# Patient Record
Sex: MALE | Race: WHITE | NOT HISPANIC OR LATINO | Employment: OTHER | URBAN - METROPOLITAN AREA
[De-identification: names, ages, dates, MRNs, and addresses within clinical notes are randomized per-mention and may not be internally consistent; named-entity substitution may affect disease eponyms.]

---

## 2024-01-01 ENCOUNTER — APPOINTMENT (OUTPATIENT)
Dept: OCCUPATIONAL THERAPY | Facility: CLINIC | Age: 79
End: 2024-01-01
Attending: STUDENT IN AN ORGANIZED HEALTH CARE EDUCATION/TRAINING PROGRAM
Payer: MEDICARE

## 2024-01-01 ENCOUNTER — APPOINTMENT (OUTPATIENT)
Dept: MRI IMAGING | Facility: CLINIC | Age: 79
End: 2024-01-01
Attending: STUDENT IN AN ORGANIZED HEALTH CARE EDUCATION/TRAINING PROGRAM
Payer: MEDICARE

## 2024-01-01 ENCOUNTER — APPOINTMENT (OUTPATIENT)
Dept: CT IMAGING | Facility: CLINIC | Age: 79
End: 2024-01-01
Attending: PHYSICIAN ASSISTANT
Payer: MEDICARE

## 2024-01-01 ENCOUNTER — APPOINTMENT (OUTPATIENT)
Dept: GENERAL RADIOLOGY | Facility: CLINIC | Age: 79
End: 2024-01-01
Attending: INTERNAL MEDICINE
Payer: MEDICARE

## 2024-01-01 ENCOUNTER — APPOINTMENT (OUTPATIENT)
Dept: CT IMAGING | Facility: CLINIC | Age: 79
End: 2024-01-01
Attending: INTERNAL MEDICINE
Payer: MEDICARE

## 2024-01-01 ENCOUNTER — APPOINTMENT (OUTPATIENT)
Dept: CT IMAGING | Facility: CLINIC | Age: 79
End: 2024-01-01
Attending: PSYCHIATRY & NEUROLOGY
Payer: MEDICARE

## 2024-01-01 ENCOUNTER — APPOINTMENT (OUTPATIENT)
Dept: INTERVENTIONAL RADIOLOGY/VASCULAR | Facility: CLINIC | Age: 79
End: 2024-01-01
Attending: INTERNAL MEDICINE
Payer: MEDICARE

## 2024-01-01 ENCOUNTER — APPOINTMENT (OUTPATIENT)
Dept: MRI IMAGING | Facility: CLINIC | Age: 79
DRG: 023 | End: 2024-01-01
Attending: NURSE PRACTITIONER
Payer: MEDICARE

## 2024-01-01 ENCOUNTER — APPOINTMENT (OUTPATIENT)
Dept: GENERAL RADIOLOGY | Facility: CLINIC | Age: 79
End: 2024-01-01
Attending: HOSPITALIST
Payer: MEDICARE

## 2024-01-01 ENCOUNTER — APPOINTMENT (OUTPATIENT)
Dept: MRI IMAGING | Facility: CLINIC | Age: 79
End: 2024-01-01
Attending: NURSE PRACTITIONER
Payer: MEDICARE

## 2024-01-01 ENCOUNTER — APPOINTMENT (OUTPATIENT)
Dept: GENERAL RADIOLOGY | Facility: CLINIC | Age: 79
End: 2024-01-01
Attending: SURGERY
Payer: MEDICARE

## 2024-01-01 ENCOUNTER — APPOINTMENT (OUTPATIENT)
Dept: MRI IMAGING | Facility: CLINIC | Age: 79
End: 2024-01-01
Attending: PHYSICIAN ASSISTANT
Payer: MEDICARE

## 2024-01-01 ENCOUNTER — APPOINTMENT (OUTPATIENT)
Dept: CT IMAGING | Facility: CLINIC | Age: 79
End: 2024-01-01
Attending: EMERGENCY MEDICINE
Payer: MEDICARE

## 2024-01-01 ENCOUNTER — APPOINTMENT (OUTPATIENT)
Dept: SPEECH THERAPY | Facility: CLINIC | Age: 79
End: 2024-01-01
Attending: HOSPITALIST
Payer: MEDICARE

## 2024-01-01 PROCEDURE — G1010 CDSM STANSON: HCPCS

## 2024-01-01 PROCEDURE — 70496 CT ANGIOGRAPHY HEAD: CPT | Mod: MF

## 2024-01-01 PROCEDURE — 76937 US GUIDE VASCULAR ACCESS: CPT

## 2024-01-01 PROCEDURE — 70553 MRI BRAIN STEM W/O & W/DYE: CPT | Mod: MG

## 2024-01-01 PROCEDURE — 70553 MRI BRAIN STEM W/O & W/DYE: CPT | Mod: MF

## 2024-01-01 PROCEDURE — 999N000065 XR CHEST PORT 1 VIEW

## 2024-01-01 PROCEDURE — 70450 CT HEAD/BRAIN W/O DYE: CPT | Mod: MG,77

## 2024-01-01 PROCEDURE — 71045 X-RAY EXAM CHEST 1 VIEW: CPT

## 2024-01-01 PROCEDURE — 70544 MR ANGIOGRAPHY HEAD W/O DYE: CPT | Mod: MG

## 2024-01-01 PROCEDURE — 70496 CT ANGIOGRAPHY HEAD: CPT | Mod: MA

## 2024-01-01 PROCEDURE — 70450 CT HEAD/BRAIN W/O DYE: CPT | Mod: MG

## 2024-01-01 PROCEDURE — C1725 CATH, TRANSLUMIN NON-LASER: HCPCS

## 2024-01-01 PROCEDURE — 70450 CT HEAD/BRAIN W/O DYE: CPT

## 2024-01-01 PROCEDURE — 36226 PLACE CATH VERTEBRAL ART: CPT

## 2024-01-01 PROCEDURE — 999N000065 XR ABDOMEN PORT 1 VIEW

## 2024-12-25 ENCOUNTER — APPOINTMENT (OUTPATIENT)
Dept: CT IMAGING | Facility: CLINIC | Age: 79
DRG: 023 | End: 2024-12-25
Attending: EMERGENCY MEDICINE
Payer: MEDICARE

## 2024-12-25 ENCOUNTER — HOSPITAL ENCOUNTER (INPATIENT)
Facility: CLINIC | Age: 79
End: 2024-12-25
Attending: EMERGENCY MEDICINE | Admitting: STUDENT IN AN ORGANIZED HEALTH CARE EDUCATION/TRAINING PROGRAM
Payer: MEDICARE

## 2024-12-25 DIAGNOSIS — I65.1 BASILAR ARTERY STENOSIS: ICD-10-CM

## 2024-12-25 DIAGNOSIS — G47.33 OSA (OBSTRUCTIVE SLEEP APNEA): Primary | ICD-10-CM

## 2024-12-25 DIAGNOSIS — R47.81 SLURRED SPEECH: ICD-10-CM

## 2024-12-25 LAB
ANION GAP SERPL CALCULATED.3IONS-SCNC: 13 MMOL/L (ref 7–15)
APTT PPP: 26 SECONDS (ref 22–38)
ATRIAL RATE - MUSE: 64 BPM
BASOPHILS # BLD AUTO: 0.1 10E3/UL (ref 0–0.2)
BASOPHILS NFR BLD AUTO: 1 %
BUN SERPL-MCNC: 14.9 MG/DL (ref 8–23)
CALCIUM SERPL-MCNC: 9.2 MG/DL (ref 8.8–10.4)
CHLORIDE SERPL-SCNC: 102 MMOL/L (ref 98–107)
CHOLEST SERPL-MCNC: 222 MG/DL
CREAT SERPL-MCNC: 1.44 MG/DL (ref 0.67–1.17)
DIASTOLIC BLOOD PRESSURE - MUSE: NORMAL MMHG
EGFRCR SERPLBLD CKD-EPI 2021: 49 ML/MIN/1.73M2
EOSINOPHIL # BLD AUTO: 0.2 10E3/UL (ref 0–0.7)
EOSINOPHIL NFR BLD AUTO: 3 %
ERYTHROCYTE [DISTWIDTH] IN BLOOD BY AUTOMATED COUNT: 12 % (ref 10–15)
EST. AVERAGE GLUCOSE BLD GHB EST-MCNC: 100 MG/DL
GLUCOSE SERPL-MCNC: 85 MG/DL (ref 70–99)
HBA1C MFR BLD: 5.1 %
HCO3 SERPL-SCNC: 26 MMOL/L (ref 22–29)
HCT VFR BLD AUTO: 48.9 % (ref 40–53)
HDLC SERPL-MCNC: 38 MG/DL
HGB BLD-MCNC: 17.1 G/DL (ref 13.3–17.7)
IMM GRANULOCYTES # BLD: 0 10E3/UL
IMM GRANULOCYTES NFR BLD: 0 %
INR PPP: 1.1 (ref 0.85–1.15)
INTERPRETATION ECG - MUSE: NORMAL
LDLC SERPL CALC-MCNC: 131 MG/DL
LYMPHOCYTES # BLD AUTO: 1.6 10E3/UL (ref 0.8–5.3)
LYMPHOCYTES NFR BLD AUTO: 24 %
MCH RBC QN AUTO: 32.1 PG (ref 26.5–33)
MCHC RBC AUTO-ENTMCNC: 35 G/DL (ref 31.5–36.5)
MCV RBC AUTO: 92 FL (ref 78–100)
MONOCYTES # BLD AUTO: 0.7 10E3/UL (ref 0–1.3)
MONOCYTES NFR BLD AUTO: 11 %
NEUTROPHILS # BLD AUTO: 4 10E3/UL (ref 1.6–8.3)
NEUTROPHILS NFR BLD AUTO: 61 %
NONHDLC SERPL-MCNC: 184 MG/DL
NRBC # BLD AUTO: 0 10E3/UL
NRBC BLD AUTO-RTO: 0 /100
P AXIS - MUSE: 58 DEGREES
PLATELET # BLD AUTO: 184 10E3/UL (ref 150–450)
POTASSIUM SERPL-SCNC: 3.7 MMOL/L (ref 3.4–5.3)
PR INTERVAL - MUSE: 164 MS
QRS DURATION - MUSE: 86 MS
QT - MUSE: 412 MS
QTC - MUSE: 425 MS
R AXIS - MUSE: -27 DEGREES
RBC # BLD AUTO: 5.32 10E6/UL (ref 4.4–5.9)
SODIUM SERPL-SCNC: 141 MMOL/L (ref 135–145)
SYSTOLIC BLOOD PRESSURE - MUSE: NORMAL MMHG
T AXIS - MUSE: 67 DEGREES
T4 FREE SERPL-MCNC: 0.93 NG/DL (ref 0.9–1.7)
TRIGL SERPL-MCNC: 265 MG/DL
TROPONIN T SERPL HS-MCNC: 11 NG/L
TROPONIN T SERPL HS-MCNC: 12 NG/L
TROPONIN T SERPL HS-MCNC: 13 NG/L
TSH SERPL DL<=0.005 MIU/L-ACNC: 4.94 UIU/ML (ref 0.3–4.2)
UFH PPP CHRO-ACNC: 0.53 IU/ML
VENTRICULAR RATE- MUSE: 64 BPM
WBC # BLD AUTO: 6.6 10E3/UL (ref 4–11)

## 2024-12-25 PROCEDURE — 36415 COLL VENOUS BLD VENIPUNCTURE: CPT | Performed by: STUDENT IN AN ORGANIZED HEALTH CARE EDUCATION/TRAINING PROGRAM

## 2024-12-25 PROCEDURE — 84443 ASSAY THYROID STIM HORMONE: CPT | Performed by: STUDENT IN AN ORGANIZED HEALTH CARE EDUCATION/TRAINING PROGRAM

## 2024-12-25 PROCEDURE — 250N000013 HC RX MED GY IP 250 OP 250 PS 637: Performed by: EMERGENCY MEDICINE

## 2024-12-25 PROCEDURE — 250N000009 HC RX 250: Performed by: EMERGENCY MEDICINE

## 2024-12-25 PROCEDURE — 85610 PROTHROMBIN TIME: CPT | Performed by: EMERGENCY MEDICINE

## 2024-12-25 PROCEDURE — 250N000013 HC RX MED GY IP 250 OP 250 PS 637: Performed by: STUDENT IN AN ORGANIZED HEALTH CARE EDUCATION/TRAINING PROGRAM

## 2024-12-25 PROCEDURE — 80061 LIPID PANEL: CPT | Performed by: STUDENT IN AN ORGANIZED HEALTH CARE EDUCATION/TRAINING PROGRAM

## 2024-12-25 PROCEDURE — 85520 HEPARIN ASSAY: CPT | Performed by: EMERGENCY MEDICINE

## 2024-12-25 PROCEDURE — 80048 BASIC METABOLIC PNL TOTAL CA: CPT | Performed by: EMERGENCY MEDICINE

## 2024-12-25 PROCEDURE — A9585 GADOBUTROL INJECTION: HCPCS | Performed by: STUDENT IN AN ORGANIZED HEALTH CARE EDUCATION/TRAINING PROGRAM

## 2024-12-25 PROCEDURE — 250N000011 HC RX IP 250 OP 636: Performed by: EMERGENCY MEDICINE

## 2024-12-25 PROCEDURE — 93005 ELECTROCARDIOGRAM TRACING: CPT

## 2024-12-25 PROCEDURE — 84484 ASSAY OF TROPONIN QUANT: CPT | Performed by: EMERGENCY MEDICINE

## 2024-12-25 PROCEDURE — 85025 COMPLETE CBC W/AUTO DIFF WBC: CPT | Performed by: EMERGENCY MEDICINE

## 2024-12-25 PROCEDURE — 258N000003 HC RX IP 258 OP 636: Performed by: STUDENT IN AN ORGANIZED HEALTH CARE EDUCATION/TRAINING PROGRAM

## 2024-12-25 PROCEDURE — 70450 CT HEAD/BRAIN W/O DYE: CPT | Mod: MA

## 2024-12-25 PROCEDURE — 99418 PROLNG IP/OBS E/M EA 15 MIN: CPT | Performed by: STUDENT IN AN ORGANIZED HEALTH CARE EDUCATION/TRAINING PROGRAM

## 2024-12-25 PROCEDURE — 99223 1ST HOSP IP/OBS HIGH 75: CPT | Mod: AI | Performed by: STUDENT IN AN ORGANIZED HEALTH CARE EDUCATION/TRAINING PROGRAM

## 2024-12-25 PROCEDURE — 36415 COLL VENOUS BLD VENIPUNCTURE: CPT | Performed by: EMERGENCY MEDICINE

## 2024-12-25 PROCEDURE — 120N000013 HC R&B IMCU

## 2024-12-25 PROCEDURE — 255N000002 HC RX 255 OP 636: Performed by: STUDENT IN AN ORGANIZED HEALTH CARE EDUCATION/TRAINING PROGRAM

## 2024-12-25 PROCEDURE — 99291 CRITICAL CARE FIRST HOUR: CPT | Mod: 25

## 2024-12-25 PROCEDURE — 85730 THROMBOPLASTIN TIME PARTIAL: CPT | Performed by: EMERGENCY MEDICINE

## 2024-12-25 PROCEDURE — 84439 ASSAY OF FREE THYROXINE: CPT | Performed by: STUDENT IN AN ORGANIZED HEALTH CARE EDUCATION/TRAINING PROGRAM

## 2024-12-25 PROCEDURE — 83036 HEMOGLOBIN GLYCOSYLATED A1C: CPT | Performed by: STUDENT IN AN ORGANIZED HEALTH CARE EDUCATION/TRAINING PROGRAM

## 2024-12-25 PROCEDURE — 99291 CRITICAL CARE FIRST HOUR: CPT | Mod: FS | Performed by: PHYSICIAN ASSISTANT

## 2024-12-25 PROCEDURE — 84484 ASSAY OF TROPONIN QUANT: CPT | Performed by: STUDENT IN AN ORGANIZED HEALTH CARE EDUCATION/TRAINING PROGRAM

## 2024-12-25 RX ORDER — IOPAMIDOL 755 MG/ML
67 INJECTION, SOLUTION INTRAVASCULAR ONCE
Status: COMPLETED | OUTPATIENT
Start: 2024-12-25 | End: 2024-12-25

## 2024-12-25 RX ORDER — ONDANSETRON 4 MG/1
4 TABLET, ORALLY DISINTEGRATING ORAL EVERY 6 HOURS PRN
Status: ACTIVE | OUTPATIENT
Start: 2024-12-25

## 2024-12-25 RX ORDER — POLYETHYLENE GLYCOL 3350 17 G/17G
17 POWDER, FOR SOLUTION ORAL 2 TIMES DAILY PRN
Status: ACTIVE | OUTPATIENT
Start: 2024-12-25

## 2024-12-25 RX ORDER — AMOXICILLIN 250 MG
2 CAPSULE ORAL 2 TIMES DAILY PRN
Status: ACTIVE | OUTPATIENT
Start: 2024-12-25

## 2024-12-25 RX ORDER — ASPIRIN 325 MG
325 TABLET ORAL ONCE
Status: COMPLETED | OUTPATIENT
Start: 2024-12-25 | End: 2024-12-25

## 2024-12-25 RX ORDER — HEPARIN SODIUM 10000 [USP'U]/100ML
0-5000 INJECTION, SOLUTION INTRAVENOUS CONTINUOUS
Status: DISPENSED | OUTPATIENT
Start: 2024-12-25

## 2024-12-25 RX ORDER — AMOXICILLIN 250 MG
1 CAPSULE ORAL 2 TIMES DAILY PRN
Status: ACTIVE | OUTPATIENT
Start: 2024-12-25

## 2024-12-25 RX ORDER — CLOPIDOGREL 300 MG/1
300 TABLET, FILM COATED ORAL ONCE
Status: COMPLETED | OUTPATIENT
Start: 2024-12-25 | End: 2024-12-25

## 2024-12-25 RX ORDER — ONDANSETRON 2 MG/ML
4 INJECTION INTRAMUSCULAR; INTRAVENOUS EVERY 6 HOURS PRN
Status: ACTIVE | OUTPATIENT
Start: 2024-12-25

## 2024-12-25 RX ORDER — CALCIUM CARBONATE 500 MG/1
1000 TABLET, CHEWABLE ORAL 4 TIMES DAILY PRN
Status: ACTIVE | OUTPATIENT
Start: 2024-12-25

## 2024-12-25 RX ORDER — GADOBUTROL 604.72 MG/ML
9 INJECTION INTRAVENOUS ONCE
Status: COMPLETED | OUTPATIENT
Start: 2024-12-25 | End: 2024-12-25

## 2024-12-25 RX ORDER — ATORVASTATIN CALCIUM 40 MG/1
40 TABLET, FILM COATED ORAL EVERY EVENING
Status: DISCONTINUED | OUTPATIENT
Start: 2024-12-25 | End: 2024-12-26

## 2024-12-25 RX ORDER — LIDOCAINE 40 MG/G
CREAM TOPICAL
Status: ACTIVE | OUTPATIENT
Start: 2024-12-25

## 2024-12-25 RX ORDER — ACETAMINOPHEN 325 MG/1
650 TABLET ORAL EVERY 4 HOURS PRN
Status: ACTIVE | OUTPATIENT
Start: 2024-12-25

## 2024-12-25 RX ORDER — ACETAMINOPHEN 650 MG/1
650 SUPPOSITORY RECTAL EVERY 4 HOURS PRN
Status: ACTIVE | OUTPATIENT
Start: 2024-12-25

## 2024-12-25 RX ADMIN — GADOBUTROL 9 ML: 604.72 INJECTION INTRAVENOUS at 15:57

## 2024-12-25 RX ADMIN — CLOPIDOGREL BISULFATE 300 MG: 300 TABLET, FILM COATED ORAL at 13:39

## 2024-12-25 RX ADMIN — IOPAMIDOL 67 ML: 755 INJECTION, SOLUTION INTRAVENOUS at 12:44

## 2024-12-25 RX ADMIN — SODIUM CHLORIDE 100 ML: 9 INJECTION, SOLUTION INTRAVENOUS at 12:45

## 2024-12-25 RX ADMIN — SODIUM CHLORIDE 500 ML: 9 INJECTION, SOLUTION INTRAVENOUS at 14:25

## 2024-12-25 RX ADMIN — ATORVASTATIN CALCIUM 40 MG: 40 TABLET, FILM COATED ORAL at 22:29

## 2024-12-25 RX ADMIN — ASPIRIN 325 MG ORAL TABLET 325 MG: 325 PILL ORAL at 13:39

## 2024-12-25 RX ADMIN — HEPARIN SODIUM 1100 UNITS/HR: 10000 INJECTION, SOLUTION INTRAVENOUS at 13:51

## 2024-12-25 ASSESSMENT — ACTIVITIES OF DAILY LIVING (ADL)
ADLS_ACUITY_SCORE: 42
ADLS_ACUITY_SCORE: 41
ADLS_ACUITY_SCORE: 42
ADLS_ACUITY_SCORE: 41

## 2024-12-25 NOTE — ED NOTES
Bed: ED01  Expected date:   Expected time:   Means of arrival:   Comments:  437  79 M dizzy/htn/slurred speech/resolved  1206

## 2024-12-25 NOTE — ED NOTES
Pts family called - stated patient developed slurred speech again. No facial droop.  No changes in sensation to face. Neuro at bedside for reexam. MD aware.

## 2024-12-25 NOTE — ED PROVIDER NOTES
Emergency Department Note      History of Present Illness     Chief Complaint   Dizziness (Sudden onset dizziness/sweating/family reported some slurred speech that was brief and gone by the time medics arrived. Was just sitting when episode happened.  No dizziness w change of position.  Off balance. +5 strength. LKW 1140)      HPI   Elio Aguilar is a 79 year old male presenting to the ED for the evaluation of dizziness and slurred speech. The patient states that he was using the bathroom earlier today around 1140 (last known well) when he suddenly experienced dizziness, full body weakness, and slurred speech. He states that the slurred speech event lasted less than 10 minutes but that he does not feel his speech has returned to normal yet at this point. He reports his other symptoms to be relatively improved since onset although notes that he still feels a tingling sensation currently. Elio denies difficulty finding words, numbness on either side of his body, headache, vision changes, abdominal pain, chest pain, or heart racing. He also denies any medical conditions, regular medications, or allergies. He states that he has never experienced these symptoms before.    Independent Historian   None      Past Medical History     Medical History and Problem List   Fracture    Medications   No current outpatient medications on file.    Surgical History   Cardiac surgery    Physical Exam     Physical Exam  Eyes:  The pupils are equal and round    Conjunctivae and sclerae are normal  ENT:    The nose is normal    Pinnae are normal    The oropharynx is normal  Neck:  Normal range of motion    There is no rigidity noted  CV:  Regular rate and rhythm     No edema  Resp:  Lungs are clear    Non-labored    No rales    No wheezing   GI:  Abdomen is soft, there is no rigidity    No distension    No rebound tenderness   MS:  Normal muscular tone    No asymmetric leg swelling  Skin:  No rash or acute skin lesions  noted  Neuro:   Awake, alert, GCS 15    Speech is normal and fluent    Face is symmetric    Moves all extremities    Normal finger-nose-finger     strength equal bilaterally    Equal sensation bilaterally    Hip flexion 5/5 bilaterally       Diagnostics     Lab Results   Labs Ordered and Resulted from Time of ED Arrival to Time of ED Departure   BASIC METABOLIC PANEL - Abnormal       Result Value    Sodium 141      Potassium 3.7      Chloride 102      Carbon Dioxide (CO2) 26      Anion Gap 13      Urea Nitrogen 14.9      Creatinine 1.44 (*)     GFR Estimate 49 (*)     Calcium 9.2      Glucose 85     TSH WITH FREE T4 REFLEX - Abnormal    TSH 4.94 (*)    INR - Normal    INR 1.10     PARTIAL THROMBOPLASTIN TIME - Normal    aPTT 26     TROPONIN T, HIGH SENSITIVITY - Normal    Troponin T, High Sensitivity 12     HEMOGLOBIN A1C - Normal    Estimated Average Glucose 100      Hemoglobin A1C 5.1     T4 FREE - Normal    Free T4 0.93     TROPONIN T, HIGH SENSITIVITY - Normal    Troponin T, High Sensitivity 13     GLUCOSE MONITOR NURSING POCT   CBC WITH PLATELETS AND DIFFERENTIAL    WBC Count 6.6      RBC Count 5.32      Hemoglobin 17.1      Hematocrit 48.9      MCV 92      MCH 32.1      MCHC 35.0      RDW 12.0      Platelet Count 184      % Neutrophils 61      % Lymphocytes 24      % Monocytes 11      % Eosinophils 3      % Basophils 1      % Immature Granulocytes 0      NRBCs per 100 WBC 0      Absolute Neutrophils 4.0      Absolute Lymphocytes 1.6      Absolute Monocytes 0.7      Absolute Eosinophils 0.2      Absolute Basophils 0.1      Absolute Immature Granulocytes 0.0      Absolute NRBCs 0.0     LIPID REFLEX TO DIRECT LDL PANEL   GLUCOSE MONITOR NURSING POCT   GLUCOSE MONITOR NURSING POCT   GLUCOSE MONITOR NURSING POCT   TROPONIN T, HIGH SENSITIVITY       Imaging      CTA Head Neck with Contrast   Final Result   IMPRESSION:    HEAD CTA:    1.  Multifocal high-grade narrowings of the proximal and mid basilar artery.  The distal basilar artery is patent and has normal caliber. There is a prominent left posterior communicating artery.   2.  High-grade narrowing of the P1 segment of the left posterior cerebral artery. Mild to moderate narrowing of the distal P2 segment of the right posterior cerebral artery.   3.  Short segment moderate narrowing of the M1 segment of the left middle cerebral artery. The left middle cerebral artery has normal caliber distal to this area.    4.  Moderate atherosclerotic narrowing of the supraclinoid right internal carotid artery.       NECK CTA:   1.  Approximately 40-50% narrowing of the proximal right internal carotid artery based on the NASCET criteria.   2.  No hemodynamically significant narrowing of the left internal carotid artery based on the NASCET criteria.   3.  The vertebral arteries are patent throughout their course in the neck.      Discussed the CTA findings with Dr. Boogie at 12:56 PM and 1:04 PM, 12/25/2024.      CT Head w/o Contrast   Final Result   IMPRESSION:   1.  No intracranial hemorrhage, mass lesions, hydrocephalus or CT evidence for an acute infarct. MRI is more sensitive for the evaluation of acute infarct.   2.  Mild diffuse cerebral parenchymal volume loss. Presumed chronic hypertensive/microvascular ischemic white matter changes.         Discussed the head CT findings with Dr. Boogie at 12:56 PM, 12/25/2024.          EKG   ECG results from 12/25/24   EKG 12-lead, tracing only     Value    Systolic Blood Pressure     Diastolic Blood Pressure     Ventricular Rate 64    Atrial Rate 64    CT Interval 164    QRS Duration 86        QTc 425    P Axis 58    R AXIS -27    T Axis 67    Interpretation ECG      Sinus rhythm  Normal ECG    ECG performed at 1235, interpreted by me at 1246.     Independent Interpretation   CT Head: No intracranial hemorrhage.    ED Course      Medications Administered   Medications - No data to display    Procedures   Procedures     Discussion  of Management   Stroke Neurology, Maria C Alfaro PA-C  Admitting Hospitalist, Dr. Diop    ED Course   ED Course as of 12/25/24 1459   Wed Dec 25, 2024   1238 I obtained the history and examined the patient as noted above.      1243 I spoke with Maria C Alfaro PA-C from stroke neuro regarding this patient.   1256 I spoke to radiology regarding this patient.   1318 I rechecked and updated the patient.      1324 I rechecked the patient as his speech began to slur again.   1331 I spoke to Dr. Diop of the hospitalist service who accepts the patient for admission.     1338 I rechecked and updated the patient.          Additional Documentation  None    Medical Decision Making / Diagnosis     CMS Diagnoses: The patient has stroke symptoms:         ED Stroke specific documentation           NIHSS PDF     Patient last known well time: 1130 AM 12/25/24  ED Provider first to bedside at: 1240  CT Results received at: 1256    Thrombolytics:   Not given due to:   - minor/isolated/quickly resolving symptoms    If treating with thrombolytics: Ensure SBP<180 and DBP<105 prior to treatment with thrombolytics.  Administering thrombolytics after treatment with IV labetalol, hydralazine, or nicardipine is reasonable once BP control is established.    Endovascular Retrieval:  Not initiated due to absence of proximal vessel occlusion    National Institutes of Health Stroke Scale (Baseline)  Time Performed: 1240     Score    Level of consciousness: (0)   Alert, keenly responsive    LOC questions: (0)   Answers both questions correctly    LOC commands: (0)   Performs both tasks correctly    Best gaze: (0)   Normal    Visual: (0)   No visual loss    Facial palsy: (0)   Normal symmetrical movements    Motor arm (left): (0)   No drift    Motor arm (right): (0)   No drift    Motor leg (left): (0)   No drift    Motor leg (right): (0)   No drift    Limb ataxia: (0)   Absent    Sensory: (0)   Normal- no sensory loss    Best language: (0)   Normal- no  aphasia    Dysarthria: (0)   Normal    Extinction and inattention: (0)   No abnormality        Total Score:  0        Stroke Mimics were considered (including migraine headache, seizure disorder, hypoglycemia (or hyperglycemia), head or spinal trauma, CNS infection, Toxin ingestion and shock state (e.g. sepsis) .      National Institutes of Health Stroke Scale  Time Performed: 1324      Score    Level of consciousness: (0)   Alert, keenly responsive    LOC questions: (0)   Answers both questions correctly    LOC commands: (0)   Performs both tasks correctly    Best gaze: (0)   Normal    Visual: (0)   No visual loss    Facial palsy: (0)   Normal symmetrical movements    Motor arm (left): (0)   No drift    Motor arm (right): (0)   No drift    Motor leg (left): (0)   No drift    Motor leg (right): (0)   No drift    Limb ataxia: (0)   Absent    Sensory: (0)   Normal- no sensory loss    Best language: (0)   Normal- no aphasia    Dysarthria: (1)   Mild to moderate dysarthria    Extinction and inattention: (0)   No abnormality        Total Score:  1          MIPS       None    MDM   Elio Aguilar is a 79 year old male who presents to the emergency department with concerns about feeling lightheaded and dizzy as well as slurred speech.  Symptoms started at about 11:30 AM today.  Patient was in the bathroom when I heard report from nursing staff and code stroke was initiated.  Patient was brought back to the room.  His physical exam did not reveal any points for the NIH stroke scale and he noted that his symptoms had greatly improved.  Symptoms somewhat concerning for possible syncope but the persistent dysarthria was also concerning.  CT/CTA did not show any hemorrhage or clear large vessel occlusion but there was severe stenosis appearance of the basilar artery.  Discussed with stroke neurology per code stroke protocol.  Given findings we will plan for admission to the hospital and further treatment.  Aspirin and Plavix  were ordered.  Patient had an additional episode of slurred speech while in the ER.  Stroke neurology was at bedside shortly after the symptoms developed and evaluated the patient.  Will plan for heparin infusion due to the basilar artery stenosis, admission and MRI.  Given the findings patient will be admitted to the [facility] unit for treatment as well as 2-hour neurochecks.  Discussed with the hospitalist agreed to accept the patient.    Critical Care  The critical condition was:  Critical basilar artery narrowing and stroke symptoms    Critical interventions performed or strongly considered:  Heparin infusion    Critical care time was 40 minutes exclusive of time spent on separately billable procedures.     Disposition   The patient was admitted to the hospital.     Diagnosis     ICD-10-CM    1. Slurred speech  R47.81       2. 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        Alphonse Boogie MD  12/25/24 [id number]

## 2024-12-25 NOTE — ED TRIAGE NOTES
Sudden onset dizziness/sweating/family reported some slurred speech that was brief and gone by the time medics arrived. Was just sitting when episode happened.  No dizziness w change of position.  Off balance. +5 strength. LKW [id number]

## 2024-12-25 NOTE — CONSULTS
[facility]     Stroke Code Note          History of Present Illness     Chief Complaint: Dizziness (Sudden onset dizziness/sweating/family reported some slurred speech that was brief and gone by the time medics arrived. Was just sitting when episode happened.  No dizziness w change of position.  Off balance. +5 strength. 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  He is not taking any antiplatelet or anticoagulant medications prior to this admission.  Currently he presents after developing acute onset of dizziness described as lightheadedness, diaphoresis, dysarthria without clear focal weakness or facial droop.  Family indicate his speech was very slurred for about 5 minutes before starting to gradually improved.  He states that he sometimes feels this way after having a bowel movement and did feel the need to have a bowel movement when this was happening but did not do so until in the ED after the symptoms had improved.  On my initial evaluation in the ED his exam was nonfocal, speech clear, subsequently he again developed dysarthria without additional focal neurodeficits.  Imaging showed no acute stroke, severe stenosis/near occlusion of the basilar artery.  Head of the bed was lowered and speech did improve.  He was loaded with DAPT, started on heparin low intensity and admitted for close observation/further management.         Past Medical History     Stroke risk factors: Suspect untreated  "hypertension though no formal diagnosis, prior stroke    Preadmission antithrombotic regimen: None    Modified Simpson Score (Pre-morbid)  0 - No symptoms.                   Assessment and Plan       1.  Lightheadedness, diaphoresis, dysarthria -in the setting of severe basilar stenosis suspect stuttering posterior circulation TIA/stroke symptoms  2. ICAD involving the basilar artery, left PCA, right PCA, left MCA, supraclinoid right ICA     Intravenous Thrombolysis  Not given due to:   - minor/isolated/quickly resolving symptoms     Endovascular Treatment  Severe basilar stenosis/near occlusion present but no EVT at present given minor/resolved symptoms     Plan:  - Use orderset: \"Ischemic Stroke Routine Admission\" or \"Ischemic Stroke No Thrombolytics/No Thrombectomy ICU Admission\"  - Place Neurology IP Stroke Consult order   - Neurochecks and Vital Signs every 2 hours - please call a stroke code and put HOB flat in the case of new acute neurodeficits  - Permissive HTN; goal SBP < 220 mmHg  - Loaded with DAPT in the ED and started on heparin low intensity protocol with bolus, further AP/AC recommendations pending clinical course and workup  - Statin: Atorvastatin 40 mg, adjustment pending LDL  - MRI Brain with and without contrast   - MRA Brain without contrast  - TTE   - Telemetry, EKG  - Bedside Glucose Monitoring  - Nutrition: Nursing to document bedside swallow prior to oral intake  - A1c, Lipid Panel, Troponin x 3  - PT/OT/SLP  - Bedrest for today, keep HOB 30 degrees or lower, move HOB to flat in the case of worsening deficits  - Stroke Education  - Depression Screen  - Apnea screening questions  - Euthermia, Euglycemia     ___________________________________________________________________    Maria C KAREEM Alfaro  Vascular Neurology    To page me or covering stroke neurology team member, click here: [facility]  Choose \"On Call\" tab at top, then select \"NEUROLOGY/ALL SITES\" from middle drop-down box, press Enter, then " "look for \"stroke\" or \"telestroke\" for your site.  ___________________________________________________________________        Imaging/Labs   (personally reviewed CT, CTA)    CT head: 1.  No intracranial hemorrhage, mass lesions, hydrocephalus or CT evidence for an acute infarct. MRI is more sensitive for the evaluation of acute infarct.  2.  Mild diffuse cerebral parenchymal volume loss. Presumed chronic hypertensive/microvascular ischemic white matter changes.    HEAD CTA:   1.  Multifocal high-grade narrowings of the proximal and mid basilar artery. The distal basilar artery is patent and has normal caliber. There is a prominent left posterior communicating artery.  2.  High-grade narrowing of the P1 segment of the left posterior cerebral artery. Mild to moderate narrowing of the distal P2 segment of the right posterior cerebral artery.  3.  Short segment moderate narrowing of the M1 segment of the left middle cerebral artery. The left middle cerebral artery has normal caliber distal to this area.   4.  Moderate atherosclerotic narrowing of the supraclinoid right internal carotid artery.      NECK CTA:  1.  Approximately 40-50% narrowing of the proximal right internal carotid artery based on the NASCET criteria.  2.  No hemodynamically significant narrowing of the left internal carotid artery based on the NASCET criteria.  3.  The vertebral arteries are patent throughout their course in the neck.         Physical Examination     BP: (!) 155/78   Pulse: 59   Resp: 16   Temp: 98  F (36.7  C)   Temp src: Axillary   SpO2: 99 %       Weight: 90.7 kg (200 lb)    Wt Readings from Last 2 Encounters:   12/25/24 90.7 kg (200 lb)       General Exam  General:  patient lying in bed without any acute distress    HEENT:  normocephalic/atraumatic    Neuro Exam  Mental Status:  alert, oriented x 3, follows commands, naming and repetition normal, speech initially clear, later with mild dysarthria, remains fluent  Cranial Nerves:  " visual fields intact, PERRL, EOMI with normal smooth pursuit, facial sensation intact and symmetric, facial movements symmetric, hearing not formally tested but intact to conversation, mild dysarthria  Motor:  normal muscle tone and bulk, no abnormal movements, able to move all limbs spontaneously, strength 5/5 throughout upper and lower extremities, no pronator drift  Sensory:  light touch sensation intact and symmetric throughout upper and lower extremities, no extinction on double simultaneous stimulation   Coordination:  normal finger-to-nose and heel-to-shin bilaterally without dysmetria, rapid alternating movements symmetric  Station/Gait:  deferred        Stroke Scales       NIHSS  1a. Level of Consciousness 0-->Alert, keenly responsive   1b. LOC Questions 0-->Answers both questions correctly   1c. LOC Commands 0-->Performs both tasks correctly   2.   Best Gaze 0-->Normal   3.   Visual 0-->No visual loss   4.   Facial Palsy 0-->Normal symmetrical movements   5a. Motor Arm, Left 0-->No drift, limb holds 90 (or 45) degrees for full 10 secs   5b. Motor Arm, Right 0-->No drift, limb holds 90 (or 45) degrees for full 10 secs   6a. Motor Leg, Left 0-->No drift, leg holds 30 degree position for full 5 secs   6b. Motor Leg, right 0-->No drift, leg holds 30 degree position for full 5 secs   7.   Limb Ataxia 0-->Absent   8.   Sensory 0-->Normal, no sensory loss   9.   Best Language 0-->No aphasia, normal   10. Dysarthria 1-->Mild-to-moderate dysarthria, patient slurs at least some words and, at worst, can be understood with some difficulty   11. Extinction and Inattention  0-->No abnormality   Total 1 (12/25/24 1404)            Labs     CBC  Lab Results   Component Value Date    HGB 17.1 12/25/2024    HCT 48.9 12/25/2024    WBC 6.6 12/25/2024     12/25/2024       BMP  Lab Results   Component Value Date     12/25/2024    POTASSIUM 3.7 12/25/2024    CHLORIDE 102 12/25/2024    CO2 26 12/25/2024    BUN 14.9  12/25/2024    CR 1.44 (H) 12/25/2024    GLC 85 12/25/2024    SPENSER 9.2 12/25/2024       INR  INR   Date Value Ref Range Status   12/25/2024 1.10 0.85 - 1.15 Final   12/18/2010 1.05 0.86 - 1.14 Final       Data   Stroke Code Data  (for stroke code without tele)  Stroke code activated 12/25/24  1239   First stroke provider response 12/25/24  1240   Last known normal 12/25/24  1130   Time of discovery (or onset of symptoms) 12/25/24  1130   Head CT read by Stroke Neuro Provider 12/25/24  1247   Was stroke code de-escalated? Yes  12/25/24  1316        Clinically Significant Risk Factors Present on Admission                                       Time Spent on this Encounter   Billing: I personally examined and evaluated the patient today. At the time of my evaluation and management the patient was critical condition today due to stroke code. I personally managed chart and imaging review, exam, discussion with patient/family/care team. Key decisions made today included no acute stroke treatment, load DAPT, MRI, admit for stroke eval. I spent a total of 90 minutes providing critical care services, evaluating the patient, directing care and reviewing laboratory values and radiologic reports.

## 2024-12-25 NOTE — H&P
[facility]    History and Physical - Hospitalist Service       Date of Admission:  12/25/2024    Assessment & Plan      Elio Aguilar is a 79 year old male with history of HTN, HLD, ANGEL, Reye's Syndrome who was admitted on 12/25/2024 with concern for posterior circulation TIA vs CVA.    Concern for posterior circulation TIA vs CVA  High-grade stenosis of proximal-mid basilar artery  High grade stenosis of L PCA P1 segment  ICAD involving basilar artery, L PCA, R PCA, L MCA, supraclinoid R ICA   Hx of L posterior corona radiata CVA (2010)  Hx of PFO s/p closure (2010, [place])  Hx of tobacco use  Patient with history of CVA in setting of PFO presenting with acute onset dizziness and speech changes found to have high-grade basilar artery and left PCA P1 segment stenosis without acute hemorrhage or ischemic infarct on CT.  Symptoms quickly resolved; stroke neurology involved in the ED and patient was loaded with aspirin and Plavix.  Remains hemodynamically stable with no ongoing deficits on exam.  Plan for MRI/MRA of brain to guide possible intervention.  In setting of prior CVA and suspected TIA, patient will need aggressive risk factor modification.   -Continue heparin gtt, s/p ASA/plavix load  -Permissive HTN, goal SBP <220  -Neurochecks q2h  -MRI/MRA brain (ordered)  -TTE ordered  -Cardiac monitoring  -A1c, lipid panel ordered  -PT/OT/SLP  -Bedrest today, HOB <30 degrees  -Outpatient sleep medicine referral on discharge  -Stroke Neurology following    Elevated creatinine, ANGELICA vs underlying CKD  Prior creatinine levels around 1.3 (in 2010), creatinine 1.44 on admission.  Suspect some component of underlying CKD, although no prior diagnosis of this.  -500cc LR bolus now  -Daily BMP    HTN, suspected - No PTA medications. Allowing permissive HTN as above.  HLD - Lipid panel ordered.   ANGEL - No PTA CPAP use. Sleep Medicine referral on discharge.  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 Reportedly comatose for unknown time with full recovery.          Diet: NPO for Medical/Clinical Reasons Except for: No Exceptions    DVT Prophylaxis: Heparin gtt  Whitehead Catheter: Not present  Lines: None   Cardiac Monitoring: None  Code Status:      Clinically Significant Risk Factors Present on Admission                                      Disposition Plan     Medically Ready for Discharge: Anticipated in 2-4 Days     Dave Diop MD  Hospitalist Service  [facility]  Securely message with [employer] (more info)  Text page via [employer] Paging/Directory     ______________________________________________________________________    Chief Complaint   Dizziness    History is obtained from the patient     History of Present Illness   Elio Aguilar is a 79 year old male with history of HTN, HLD, ANGEL, Reye's Syndrome who presented to the ED from home via EMS after sudden onset dizziness, sweating, slurred speech around 1140. Patient was reportedly was using the bathroom when the episode occurred. Further endorses full body weakness. Most symptoms were resolved by time he reached ED, although states his speech still feels off. Denies numbness, headache, vision changes, LOC, chest pain, SOB, abdominal pain.    Initial ED workup notable for electrolytes normal, Cr 1.44 (baseline 1.3, although from 2010), troponin 12, CBC WNL, INR 1.1. CT head without acute changes and notable for chronic hypertensive/microvascular ischemic changes. CTA head neck showed high-grade narrowing of proximal/mid basilar artery, high grade narrowing of P1 segment of L posterior cerebral artery, moderate narrowing of distal P2 of R posterior cerebral artery, moderate narrowing of M1 segment L MCA. EKG showed NSR. Patient received , plavix 300. Stroke Neuro consulted. MRI brain pending.    In my discussion with the patient and family, the above history.  He denies ever like this  before and feels he is back to his baseline.  Denies fever, chills, recent illness, sick contacts.  Describes prolonged hospitalization in early 1980s in setting of Reye's syndrome.  Also describes a prior stroke in 2010 that he states was due to air embolism and PFO; underwent PFO repair in 2010 at [facility].  Takes no medications and does not routinely see a physician.    Past Medical History    No past medical history on file.    Past Surgical History   No past surgical history on file.    Prior to Admission Medications   None      Review of Systems    The 10 point Review of Systems is negative other than noted in the HPI or here.     Physical Exam   Vital Signs: Temp: 98  F (36.7  C) Temp src: Axillary BP: (!) 181/101 Pulse: 68   Resp: 18        Weight: 200 lbs 0 oz    General: Awake, alert, NAD, appropriate interaction, laying flat in bed  HEENT: Atraumatic, normocephalic, EOMI, no scleral icterus  CV: RRR, no murmurs, no ROSEY, distal pulses intact, no JVD  Pulm: CTAB, breathing comfortably on RA, no wheezes, no crackles  Abd: Soft, non-tender, non-distended, no overlying skin changes, no palpable hepatosplenomegaly  Skin: No rashes, lesions, or wounds visualized  Neuro: AOx4, CN II-XII grossly intact, speech normal, no facial droop, no focal deficits, moving all extremities spontaneously    Medical Decision Making       90 MINUTES SPENT BY ME on the date of service doing chart review, history, exam, documentation & further activities per the note.      Data     I have personally reviewed the following data over the past 24 hrs:    6.6  \   17.1   / 184     141 102 14.9 /  85   3.7 26 1.44 (H) \     Trop: 12 BNP: N/A     INR:  1.10 PTT:  26   D-dimer:  N/A Fibrinogen:  N/A       Imaging results reviewed over the past 24 hrs:   Recent Results (from the past 24 hours)   CT Head w/o Contrast    Narrative    EXAM: CT HEAD W/O CONTRAST  LOCATION: [facility]  DATE: 12/25/2024    INDICATION: Code  Stroke to evaluate for potential thrombolysis and thrombectomy. PLEASE READ IMMEDIATELY. Dizziness, slow speech.  COMPARISON: None.  TECHNIQUE: Routine CT Head without IV contrast. Multiplanar reformats. Dose reduction techniques were used.    FINDINGS:  INTRACRANIAL CONTENTS: No intracranial hemorrhage. Mild diffuse cerebral parenchymal volume loss. No midline shift. No CT evidence for an acute infarct. Mild periventricular and scattered foci of deep white matter hypodensities involving both cerebral   hemispheres.    VISUALIZED ORBITS/SINUSES/MASTOIDS: Prior bilateral cataract surgery. Visualized portions of the orbits are otherwise unremarkable. Mild mucosal thickening of the ethmoid air cells. No middle ear or mastoid effusion.    BONES/SOFT TISSUES: No acute abnormality.      Impression    IMPRESSION:  1.  No intracranial hemorrhage, mass lesions, hydrocephalus or CT evidence for an acute infarct. MRI is more sensitive for the evaluation of acute infarct.  2.  Mild diffuse cerebral parenchymal volume loss. Presumed chronic hypertensive/microvascular ischemic white matter changes.      Discussed the head CT findings with Dr. Boogie at 12:56 PM, 12/25/2024.   CTA Head Neck with Contrast    Narrative    EXAM: CTA HEAD NECK W CONTRAST  LOCATION: [facility]  DATE: 12/25/2024    INDICATION: Code Stroke to evaluate for potential thrombolysis and thrombectomy. PLEASE READ IMMEDIATELY.  COMPARISON: None.  CONTRAST: 67mL Isovue 370  TECHNIQUE: Head and neck CT angiogram with IV contrast. Axial helical CT images of the head and neck vessels obtained during the arterial phase of intravenous contrast administration. Axial 2D reconstructed images and multiplanar 3D MIP reconstructed   images of the head and neck vessels were performed by the technologist. Dose reduction techniques were used. All stenosis measurements made according to NASCET criteria unless otherwise specified.    FINDINGS:   HEAD  CTA:  ANTERIOR CIRCULATION: Moderate atherosclerotic narrowing of the supraclinoid right internal carotid artery. Mild atherosclerotic atherosclerotic narrowing of the intracranial left internal carotid artery. The anterior cerebral arteries are patent without   hemodynamically significant stenosis. Short segment moderate narrowing of the M1 segment of the left middle cerebral artery. The left middle cerebral artery has normal caliber distal to this area. The right middle cerebral artery is patent without   hemodynamically significant stenosis. Standard [place] of [place] anatomy.    POSTERIOR CIRCULATION: Multifocal high-grade narrowings of the proximal and mid basilar artery. The distal basilar artery is patent and has normal caliber. There is a prominent left posterior communicating artery. High-grade narrowing of the P1 segment   of the left posterior cerebral artery. Mild to moderate narrowing of the distal P2 segment of the right posterior cerebral artery. Dominant right and smaller left vertebral artery contribute to a normal basilar artery.     DURAL VENOUS SINUSES: The major dural venous sinuses are not well opacified on this exam.    NECK CTA:  RIGHT CAROTID: Moderate atherosclerotic narrowing of the right carotid bulb and proximal right internal carotid artery. Approximately 40-50% narrowing of the proximal right internal carotid artery based on the NASCET criteria.    LEFT CAROTID: Mild atherosclerotic calcification of the left carotid bulb and proximal left internal carotid artery. No hemodynamically significant narrowing of the left internal carotid artery based on the NASCET criteria.    VERTEBRAL ARTERIES: No focal stenosis or dissection. Dominant right and smaller left vertebral arteries.    AORTIC ARCH: Common origin of the brachiocephalic artery and the left common carotid artery. The origins of the arch vessels are patent without hemodynamically significant stenosis.    NONVASCULAR STRUCTURES: The  lung apices are clear. Moderate degenerative changes of the cervical spine. Large partially calcified left paracentral disc extrusion at C5-C6 causing moderate spinal canal narrowing and high-grade narrowing of the left   lateral recess.      Impression    IMPRESSION:   HEAD CTA:   1.  Multifocal high-grade narrowings of the proximal and mid basilar artery. The distal basilar artery is patent and has normal caliber. There is a prominent left posterior communicating artery.  2.  High-grade narrowing of the P1 segment of the left posterior cerebral artery. Mild to moderate narrowing of the distal P2 segment of the right posterior cerebral artery.  3.  Short segment moderate narrowing of the M1 segment of the left middle cerebral artery. The left middle cerebral artery has normal caliber distal to this area.   4.  Moderate atherosclerotic narrowing of the supraclinoid right internal carotid artery.     NECK CTA:  1.  Approximately 40-50% narrowing of the proximal right internal carotid artery based on the NASCET criteria.  2.  No hemodynamically significant narrowing of the left internal carotid artery based on the NASCET criteria.  3.  The vertebral arteries are patent throughout their course in the neck.    Discussed the CTA findings with Dr. Boogie at 12:56 PM and 1:04 PM, 12/25/2024.

## 2024-12-25 NOTE — ED NOTES
[facility]  ED Nurse Handoff Report    ED Chief complaint: Dizziness (Sudden onset dizziness/sweating/family reported some slurred speech that was brief and gone by the time medics arrived. Was just sitting when episode happened.  No dizziness w change of position.  Off balance. +5 strength. LKW 1140)      ED Diagnosis:   Final diagnoses:   Slurred speech   Basilar artery stenosis       Code Status: Full Code    Allergies: No Known Allergies    Patient Story: Presents to ED with slurred speech, cleared upon arrival and off balance/dizziness onset around 1130.  Focused Assessment:  CT showed basilar artery stenosis.  Pt currently sx free lying flat/HOB 30 degrees.  Had brief episode of slurred speech while in ED that was resolved.  Pt was then put on strict bedrest per neuro and to flatten head if sx persist.  Pt remains sx free.    Treatments and/or interventions provided: plavix, ASA, heparin gtt  Patient's response to treatments and/or interventions: stable    To be done/followed up on inpatient unit:  n/a    Does this patient have any cognitive concerns?:  n/a    Activity level - Baseline/Home:  Independent  Activity Level - Current:    bedrest    Patient's Preferred language: English   Needed?: No    Isolation: None  Infection: Not Applicable  Patient tested for COVID 19 prior to admission: NO  Bariatric?: No    Vital Signs:   Vitals:    12/25/24 1420 12/25/24 1422 12/25/24 1459 12/25/24 1500   BP: (!) 161/78   (!) 147/96   Pulse: 68  59 58   Resp: 13  10    Temp:       TempSrc:       SpO2:  98% 97%    Weight:           Cardiac Rhythm:     Was the PSS-3 completed:   Yes  What interventions are required if any?               Family Comments: at bedside  OBS brochure/video discussed/provided to patient/family:               Name of person given brochure if not patient:               Relationship to patient:     For the majority of the shift this patient's behavior was Green.    Behavioral interventions performed were .    ED NURSE PHONE NUMBER: [phone]

## 2024-12-26 ENCOUNTER — APPOINTMENT (OUTPATIENT)
Dept: CARDIOLOGY | Facility: CLINIC | Age: 79
DRG: 023 | End: 2024-12-26
Attending: PHYSICIAN ASSISTANT
Payer: MEDICARE

## 2024-12-26 VITALS
RESPIRATION RATE: 16 BRPM | DIASTOLIC BLOOD PRESSURE: 75 MMHG | TEMPERATURE: 96.8 F | WEIGHT: 200 LBS | OXYGEN SATURATION: 95 % | HEART RATE: 57 BPM | SYSTOLIC BLOOD PRESSURE: 160 MMHG

## 2024-12-26 LAB
ALBUMIN SERPL BCG-MCNC: 4 G/DL (ref 3.5–5.2)
ALP SERPL-CCNC: 72 U/L (ref 40–150)
ALT SERPL W P-5'-P-CCNC: 22 U/L (ref 0–70)
ANION GAP SERPL CALCULATED.3IONS-SCNC: 10 MMOL/L (ref 7–15)
AST SERPL W P-5'-P-CCNC: 23 U/L (ref 0–45)
BILIRUB SERPL-MCNC: 0.5 MG/DL
BUN SERPL-MCNC: 14.9 MG/DL (ref 8–23)
CALCIUM SERPL-MCNC: 8.8 MG/DL (ref 8.8–10.4)
CHLORIDE SERPL-SCNC: 107 MMOL/L (ref 98–107)
CREAT SERPL-MCNC: 1.52 MG/DL (ref 0.67–1.17)
EGFRCR SERPLBLD CKD-EPI 2021: 46 ML/MIN/1.73M2
ERYTHROCYTE [DISTWIDTH] IN BLOOD BY AUTOMATED COUNT: 11.9 % (ref 10–15)
GLUCOSE BLDC GLUCOMTR-MCNC: 103 MG/DL (ref 70–99)
GLUCOSE BLDC GLUCOMTR-MCNC: 103 MG/DL (ref 70–99)
GLUCOSE BLDC GLUCOMTR-MCNC: 125 MG/DL (ref 70–99)
GLUCOSE SERPL-MCNC: 99 MG/DL (ref 70–99)
HCO3 SERPL-SCNC: 22 MMOL/L (ref 22–29)
HCT VFR BLD AUTO: 44.8 % (ref 40–53)
HGB BLD-MCNC: 16 G/DL (ref 13.3–17.7)
LVEF ECHO: NORMAL
MCH RBC QN AUTO: 32.2 PG (ref 26.5–33)
MCHC RBC AUTO-ENTMCNC: 35.7 G/DL (ref 31.5–36.5)
MCV RBC AUTO: 90 FL (ref 78–100)
PLATELET # BLD AUTO: 161 10E3/UL (ref 150–450)
POTASSIUM SERPL-SCNC: 4.2 MMOL/L (ref 3.4–5.3)
PROT SERPL-MCNC: 6.4 G/DL (ref 6.4–8.3)
RBC # BLD AUTO: 4.97 10E6/UL (ref 4.4–5.9)
SODIUM SERPL-SCNC: 139 MMOL/L (ref 135–145)
UFH PPP CHRO-ACNC: 0.18 IU/ML
UFH PPP CHRO-ACNC: 0.33 IU/ML
UFH PPP CHRO-ACNC: 0.39 IU/ML
WBC # BLD AUTO: 6.3 10E3/UL (ref 4–11)

## 2024-12-26 PROCEDURE — 99233 SBSQ HOSP IP/OBS HIGH 50: CPT | Mod: FS | Performed by: PHYSICIAN ASSISTANT

## 2024-12-26 PROCEDURE — 97165 OT EVAL LOW COMPLEX 30 MIN: CPT | Mod: GO

## 2024-12-26 PROCEDURE — 82310 ASSAY OF CALCIUM: CPT | Performed by: STUDENT IN AN ORGANIZED HEALTH CARE EDUCATION/TRAINING PROGRAM

## 2024-12-26 PROCEDURE — 85520 HEPARIN ASSAY: CPT | Performed by: HOSPITALIST

## 2024-12-26 PROCEDURE — 85018 HEMOGLOBIN: CPT | Performed by: STUDENT IN AN ORGANIZED HEALTH CARE EDUCATION/TRAINING PROGRAM

## 2024-12-26 PROCEDURE — 250N000013 HC RX MED GY IP 250 OP 250 PS 637: Performed by: PHYSICIAN ASSISTANT

## 2024-12-26 PROCEDURE — 999N000226 HC STATISTIC SLP IP EVAL DEFER

## 2024-12-26 PROCEDURE — 36415 COLL VENOUS BLD VENIPUNCTURE: CPT | Performed by: HOSPITALIST

## 2024-12-26 PROCEDURE — 85520 HEPARIN ASSAY: CPT | Performed by: STUDENT IN AN ORGANIZED HEALTH CARE EDUCATION/TRAINING PROGRAM

## 2024-12-26 PROCEDURE — 84460 ALANINE AMINO (ALT) (SGPT): CPT | Performed by: STUDENT IN AN ORGANIZED HEALTH CARE EDUCATION/TRAINING PROGRAM

## 2024-12-26 PROCEDURE — 255N000002 HC RX 255 OP 636: Performed by: PHYSICIAN ASSISTANT

## 2024-12-26 PROCEDURE — 85048 AUTOMATED LEUKOCYTE COUNT: CPT | Performed by: STUDENT IN AN ORGANIZED HEALTH CARE EDUCATION/TRAINING PROGRAM

## 2024-12-26 PROCEDURE — 250N000011 HC RX IP 250 OP 636: Performed by: EMERGENCY MEDICINE

## 2024-12-26 PROCEDURE — 93306 TTE W/DOPPLER COMPLETE: CPT | Mod: 26 | Performed by: INTERNAL MEDICINE

## 2024-12-26 PROCEDURE — 99232 SBSQ HOSP IP/OBS MODERATE 35: CPT | Performed by: HOSPITALIST

## 2024-12-26 PROCEDURE — 97530 THERAPEUTIC ACTIVITIES: CPT | Mod: GO

## 2024-12-26 PROCEDURE — 999N000208 ECHOCARDIOGRAM COMPLETE

## 2024-12-26 PROCEDURE — 120N000013 HC R&B IMCU

## 2024-12-26 PROCEDURE — 82947 ASSAY GLUCOSE BLOOD QUANT: CPT | Performed by: STUDENT IN AN ORGANIZED HEALTH CARE EDUCATION/TRAINING PROGRAM

## 2024-12-26 PROCEDURE — 36415 COLL VENOUS BLD VENIPUNCTURE: CPT | Performed by: STUDENT IN AN ORGANIZED HEALTH CARE EDUCATION/TRAINING PROGRAM

## 2024-12-26 PROCEDURE — 999N000147 HC STATISTIC PT IP EVAL DEFER

## 2024-12-26 RX ORDER — ASPIRIN 81 MG/1
81 TABLET ORAL DAILY
Status: DISPENSED | OUTPATIENT
Start: 2024-12-26

## 2024-12-26 RX ORDER — ATORVASTATIN CALCIUM 80 MG/1
80 TABLET, FILM COATED ORAL EVERY EVENING
Status: DISPENSED | OUTPATIENT
Start: 2024-12-26

## 2024-12-26 RX ADMIN — PERFLUTREN 10 ML: 6.52 INJECTION, SUSPENSION INTRAVENOUS at 14:30

## 2024-12-26 RX ADMIN — ASPIRIN 81 MG: 81 TABLET, COATED ORAL at 18:16

## 2024-12-26 RX ADMIN — HEPARIN SODIUM 1050 UNITS/HR: 10000 INJECTION, SOLUTION INTRAVENOUS at 06:45

## 2024-12-26 RX ADMIN — ATORVASTATIN CALCIUM 80 MG: 80 TABLET, FILM COATED ORAL at 20:18

## 2024-12-26 ASSESSMENT — ACTIVITIES OF DAILY LIVING (ADL)
ADLS_ACUITY_SCORE: 42
ADLS_ACUITY_SCORE: 42
ADLS_ACUITY_SCORE: 46
ADLS_ACUITY_SCORE: 46
ADLS_ACUITY_SCORE: 42
ADLS_ACUITY_SCORE: 46
ADLS_ACUITY_SCORE: 42
ADLS_ACUITY_SCORE: 46
ADLS_ACUITY_SCORE: 42
ADLS_ACUITY_SCORE: 46
ADLS_ACUITY_SCORE: 42
DEPENDENT_IADLS:: INDEPENDENT
ADLS_ACUITY_SCORE: 46
ADLS_ACUITY_SCORE: 46
ADLS_ACUITY_SCORE: 42
ADLS_ACUITY_SCORE: 46
ADLS_ACUITY_SCORE: 46
ADLS_ACUITY_SCORE: 42

## 2024-12-26 NOTE — CONSULTS
[facility]    Stroke Consult Note    Reason for Consult:  TIA    Chief Complaint: Dizziness (Sudden onset dizziness/sweating/family reported some slurred speech that was brief and gone by the time medics arrived. Was just sitting when episode happened.  No dizziness w change of position.  Off balance. +5 strength. LKW 1140)       HPI  Elio Aguilar is a 79 year old male prior stroke in 2010 (2010, L corona radiata infarct, at that time no significant ICAD with basilar artery open, found to have PFO w/ Valsalva and per patient this was closed at [place]), denies history of HTN, HLD, DM2 though does not see a doctor regularly and has not checked his blood pressure in over a year, 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 Mot taking any AP/AC PTA. Presented 12/25 w/ acute onset lightheadedness, diaphoresis, dysarthria without clear focal weakness or facial droop. Symptoms improved but had recurrence of dysarthria while in the ED which again improved upon laying him flat. Imaging showed no acute stroke, severe stenosis/near occlusion of the basilar artery. He was loaded with DAPT, started on heparin low intensity and admitted for close observation/further management.     Remained asymptomatic overnight.  This morning got up with assist to have a bowel movement, was back in bed and over an hour later while sitting notified nursing staff that he thought his speech was becoming slurred again.  He laid the head of his bed down before nursing staff arrived and by the time of their assessment his speech was clear.    TIA Evaluation Summarized    MRI and/or Head CT MRI: No acute stroke   Intracranial Vasculature CTA: Multifocal high-grade stenosis/near occlusion of the proximal/mid basilar, bilateral PCA stenosis, moderate left M1 stenosis, moderate supraclinoid right ICA stenosis    MRA: Absent flow related contrast at the basilar artery, multifocal iCAD   Cervical Vasculature CTA: 40 to 50% right  "cervical ICA stenosis     Echocardiogram EF 60 to 65%, no regional wall motion abnormalities, normal left atrial size   EKG/Telemetry SR   Other Testing Not Applicable      LDL 12/25/2024: 131 mg/dL   A1C 12/25/2024: 5.1 %       ABCD2 Patients Score   Age >= 60 years 1 point 1   Blood Pressure    SBP >= 140 or DBP >=  90    1 point 1   Clinical Features    - Unilateral weakness    - Speech disturbance w/o weakness    - Other    2 points  1 point    0 points 1   Duration of symptoms    >= 60 minutes    10-59 minutes    < 10 minutes   2 points  1 point  0 points 1   Diabetes  1 point 0   Patient s ABCD2 Score (0-7) = 4       Impression  Suspected recurrent TIAs with symptoms of dysarthria, lightheadedness in the setting of severe basilar stenosis/near occlusion  Multifocal ICAD involving the basilar artery, left MCA, bilateral PCAs    Recommendations   - Neurochecks and Vital Signs every 2 hours  - Permissive HTN; goal SBP < 220 mmHg  - Continue heparin low intensity protocol with boluses  - Continue aspirin 81 mg daily for stroke prevention  - Repeat CTA head/neck tomorrow (ordered)  - Final AP/AC plans depending on clinical course and repeat CTA tomorrow  - Statin: Atorvastatin 80 mg, LDL goal 40-70  - Telemetry, EKG  - Bedside Glucose Monitoring  - Nutrition: Per SLP/nursing  - PT/OT/SLP  - Activity: Recommended start to challenge him this afternoon with increased activity.  If at any time he has recurrence of neurological deficits then notify stroke neurology and lay HOB flat  - Stroke Education  - Depression Screen  - Refer for ANGEL eval at discharge  - Euthermia, Euglycemia    Patient Follow-up    -He will need to establish and follow-up with PCP upon return to [place] and be referred for follow-up with stroke neurology locally    Thank you for this consult. We will continue to follow.     Maria C Alfaro PA-C  Vascular Neurology    To page me or covering stroke neurology team member, click here: [employer]  Choose \"On " "Call\" tab at top, then select \"NEUROLOGY/ALL SITES\" from middle drop-down box, press Enter, then look for \"stroke\" or \"telestroke\" for your site.  _____________________________________________________    Clinically Significant Risk Factors Present on Admission                                        Past Medical History    No past medical history on file.  Medications   Home Meds  Prior to Admission medications    Not on File       Scheduled Meds  Current Facility-Administered Medications   Medication Dose Route Frequency Provider Last Rate Last Admin    atorvastatin (LIPITOR) tablet 40 mg  40 mg Oral QPM Dave Diop MD   40 mg at 12/25/24 2229    sodium chloride (PF) 0.9% PF flush 3 mL  3 mL Intracatheter Q8H Dave Diop MD   3 mL at 12/26/24 0029       Infusion Meds  Current Facility-Administered Medications   Medication Dose Route Frequency Provider Last Rate Last Admin    heparin 25,000 units in 0.45% NaCl 250 mL ANTICOAGULANT infusion  0-5,000 Units/hr Intravenous Continuous Alphonse Boogie MD 10.5 mL/hr at 12/26/24 0645 1,050 Units/hr at 12/26/24 0645       Allergies   No Known Allergies       PHYSICAL EXAMINATION   Temp:  [96.3  F (35.7  C)-98.1  F (36.7  C)] 96.6  F (35.9  C)  Pulse:  [58-68] 61  Resp:  [10-18] 16  BP: (137-181)/() 137/79  SpO2:  [95 %-99 %] 97 %    General Exam  General:  patient lying in bed without any acute distress    HEENT:  normocephalic/atraumatic    Neuro Exam  Mental Status:  alert, oriented x 3, follows commands, speech clear and fluent, naming and repetition normal  Cranial Nerves:  visual fields intact, PERRL, EOMI with normal smooth pursuit, facial sensation intact and symmetric, facial movements symmetric, hearing not formally tested but intact to conversation, no dysarthria  Motor:  normal muscle tone and bulk, no abnormal movements, able to move all limbs spontaneously, strength 5/5 throughout upper and lower extremities, no pronator drift  Sensory:  " light touch sensation intact and symmetric throughout upper and lower extremities, no extinction on double simultaneous stimulation   Coordination:  normal finger-to-nose and heel-to-shin bilaterally without dysmetria, rapid alternating movements symmetric  Station/Gait:  deferred    Stroke Scales    NIHSS  1a. Level of Consciousness 0-->Alert, keenly responsive   1b. LOC Questions 0-->Answers both questions correctly   1c. LOC Commands 0-->Performs both tasks correctly   2.   Best Gaze 0-->Normal   3.   Visual 0-->No visual loss   4.   Facial Palsy 0-->Normal symmetrical movements   5a. Motor Arm, Left 0-->No drift, limb holds 90 (or 45) degrees for full 10 secs   5b. Motor Arm, Right 0-->No drift, limb holds 90 (or 45) degrees for full 10 secs   6a. Motor Leg, Left 0-->No drift, leg holds 30 degree position for full 5 secs   6b. Motor Leg, right 0-->No drift, leg holds 30 degree position for full 5 secs   7.   Limb Ataxia 0-->Absent   8.   Sensory 0-->Normal, no sensory loss   9.   Best Language 0-->No aphasia, normal   10. Dysarthria 0-->Normal   11. Extinction and Inattention  0-->No abnormality   Total 0 (12/26/24 1102)       Imaging  I personally reviewed all imaging; relevant findings per HPI.    Labs Data   CBC  Recent Labs   Lab 12/26/24  0527 12/25/24  1240   WBC 6.3 6.6   RBC 4.97 5.32   HGB 16.0 17.1   HCT 44.8 48.9    184     Basic Metabolic Panel   Recent Labs   Lab 12/26/24  0527 12/26/24  0024 12/25/24  1240     --  141   POTASSIUM 4.2  --  3.7   CHLORIDE 107  --  102   CO2 22  --  26   BUN 14.9  --  14.9   CR 1.52*  --  1.44*   GLC 99 103* 85   SPENSER 8.8  --  9.2     Liver Panel  Recent Labs   Lab 12/26/24  0527   PROTTOTAL 6.4   ALBUMIN 4.0   BILITOTAL 0.5   ALKPHOS 72   AST 23   ALT 22     INR    Recent Labs   Lab Test 12/25/24  1240   INR 1.10           Stroke Consult Data Data   This was a non-emergent, non-telestroke consult.  I have personally spent a total of 50 minutes providing  care today, time spent in reviewing medical records and devising the plan as recorded above.

## 2024-12-26 NOTE — CONSULTS
Care Management Initial Consult    General Information  Assessment completed with: Patient,    Type of CM/SW Visit: Initial Assessment    Primary Care Provider verified and updated as needed: No (does not have a PCP)   Readmission within the last 30 days: no previous admission in last 30 days      Reason for Consult: discharge planning  Advance Care Planning: Advance Care Planning Reviewed: no concerns identified          Communication Assessment  Patient's communication style: spoken language (English or Bilingual)             Cognitive  Cognitive/Neuro/Behavioral: WDL  Level of Consciousness: alert  Arousal Level: opens eyes spontaneously  Orientation: oriented x 4  Mood/Behavior: calm, cooperative  Best Language: 0 - No aphasia  Speech: clear, spontaneous    Living Environment:   People in home: spouse     Current living Arrangements: house      Able to return to prior arrangements: yes       Family/Social Support:  Care provided by: self  Provides care for:    Marital Status:   Support system: Wife, Children          Description of Support System: Supportive    Support Assessment: Adequate family and caregiver support    Current Resources:   Patient receiving home care services: No        Community Resources: None  Equipment currently used at home: none  Supplies currently used at home: None    Employment/Financial:  Employment Status:          Financial Concerns: none           Does the patient's insurance plan have a 3 day qualifying hospital stay waiver?  No    Lifestyle & Psychosocial Needs:  Social Drivers of Health     Food Insecurity: Not on file   Depression: Not on file   Housing Stability: Not on file   Tobacco Use: Low Risk  (10/2/2023)    Received from [facility]    Patient History     Smoking Tobacco Use: Never     Smokeless Tobacco Use: Never     Passive Exposure: Not on file   Financial Resource Strain: Not on file   Alcohol Use: Not At Risk (10/2/2023)    Received from [facility]    AUDIT-C     Frequency of Alcohol Consumption: 2-4 times a month     Average Number of Drinks: 1 or 2     Frequency of Binge Drinking: Never   Transportation Needs: Not on file   Physical Activity: Not on file   Interpersonal Safety: Not on file   Stress: Not on file   Social Connections: Not on file   Health Literacy: Not on file       Functional Status:  Prior to admission patient needed assistance:   Dependent ADLs:: Independent  Dependent IADLs:: Independent       Mental Health Status:  Mental Health Status: No Current Concerns       Chemical Dependency Status:  Chemical Dependency Status: No Current Concerns             Values/Beliefs:  Spiritual, Cultural Beliefs, Restorationist Practices, Values that affect care: no               Discussed  Partnership in Safe Discharge Planning  document with patient/family: No    Additional Information:  CM consult for discharge planning.  Per chart review, patient presented to the ED for evaluation of dizziness and slurred speech.  Met with patient.  He lives independently with his wife in a house. They live in [place] and are in [place] to visit family for the holidays.  He has not been evaluated by PT as he is still on bedrest, but patient does not anticipate having any difficulty with mobility. He does not have a PCP but stated understanding that he will need to establish care with a provider when he returns to [place].  He was planning on flying back to [place] on 12/31 but not sure if he will need to delay that flight.    Next Steps: No further care management intervention anticipated at this time.  Please re-consult if further needs arise.  Care management signing off.        Medina Yo RN

## 2024-12-26 NOTE — PLAN OF CARE
SLP: Orders received per CVA protocol, chart reviewed. Per discussion with RN, no concerns re: communication or swallow function. Adult neuro flowsheet indicates: no aphasia, no dysarthria, no facial droop, pt passed RN dysphagia screen and tolerating a regular diet. Approached pt in room, introduced role of SLP re: communication and swallowing. Pt familiar with SLP services from past CVA, denied any issues/concerns and pleasantly declines need for evaluation(s). Educated on signs/sx neuro changes re: communication/swallowing to monitor for, and to seek re-consult if any concerns arise. Pt verbalized understanding. Will complete orders.

## 2024-12-26 NOTE — PHARMACY-CONSULT NOTE
Pharmacy Consult to evaluate for medication related stroke core measures    Elio Aguilar, 79 year old male admitted for sudden onset vertigo, slurred speech episode on 12/25/2024.    Thrombolytic was not given because of resolving sympotms    VTE Prophylaxis SCDs /PCDs placed on 12/25, as appropriate prior to end of hospital day 2. and Heparin given on 12/25 as appropriate prior to end of hospital day 2.    Antithrombotic: aspirin and clopidogrel started on 12/25, as appropriate by end of hospital day 2. Continue antithrombotic therapy on discharge to meet quality measures, unless contraindicated.    Anticoagulation if history of A-fib/flutter: Patient does not have history of A-fib/flutter - anticoagulation not required for medication related stroke core measures.     LDL Cholesterol Calculated   Date Value Ref Range Status   12/25/2024 131 (H) <100 mg/dL Final   12/19/2010 115 0 - 129 mg/dL Final     Comment:     LDL Cholesterol is the primary guide to therapy: LDL-cholesterol goal in high   risk patients is <100 mg/dL and in very high risk patients is <70 mg/dL.       Patient currently receiving Lipitor (atorvastatin) continue statin on discharge to meet quality measures, unless contraindicated.    Recommendations: None at this time    Thank you for the consult.    Ino Geiger RPH 12/26/2024 9:12 AM

## 2024-12-26 NOTE — PROGRESS NOTES
[facility]    Medicine Progress Note - Hospitalist Service    Date of Admission:  12/25/2024    Assessment & Plan   Elio Aguilar is a 79 year old male with history of HTN, HLD, ANGEL, Reye's Syndrome who was admitted on 12/25/2024 with concern for posterior circulation TIA vs CVA.     Concern for posterior circulation TIA vs CVA  High-grade stenosis of proximal-mid basilar artery  High grade stenosis of L PCA P1 segment  ICAD involving basilar artery, L PCA, R PCA, L MCA, supraclinoid R ICA   Hx of L posterior corona radiata CVA (2010)  Hx of PFO s/p closure (2010, [place])  Hx of tobacco use  Patient with history of CVA in setting of PFO presenting with acute onset dizziness and speech changes found to have high-grade basilar artery and left PCA P1 segment stenosis without acute hemorrhage or ischemic infarct on CT.  Symptoms quickly resolved; stroke neurology involved in the ED and patient was loaded with aspirin and Plavix.  Remains hemodynamically stable with no ongoing deficits on exam. In setting of prior CVA and suspected TIA, patient will need aggressive risk factor modification.   *12/25 MRI head: normal for age  *12/25 MRA Head: absent flow at basilar artery, likely severe stenosis less likely occlusion. Additional multifocal atherosclerosis including moderate stenosis of R MCA.  **12/26 recurrent symptoms ~1h after sitting upright, resolved while laying flat  *12/26 Echo: EF 60-65%, RV normal. IVC normal. Same as 2010 study  -Continue heparin gtt, s/p ASA/plavix load  - continue aspirin 81mg daily  -Permissive HTN, goal SBP <220  - repeat CTA head/neck on 12/27  - atorvastatin 80mg daily  -Neurochecks q2h  -telemetry  -PT/OT have not yet evaluated given recurrent symptoms.  -SLP signed off   -increase activity as able, lay flat if symptoms return  -Outpatient sleep medicine referral on discharge  -Stroke Neurology following     CKD Stage 3a  Prior creatinine levels around 1.3 (in  2010), creatinine 1.44 on admission.  Suspect some component of underlying CKD, although no prior diagnosis of this. After 500ml IVF, Cr 1.52  -BMP in AM     HTN, suspected - No PTA medications. Allowing permissive HTN as above.  HLD - Total Cholesterol 222, , HDL 38, trigs 265.  ANGEL - No PTA CPAP use. Sleep Medicine referral on discharge.  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 Reportedly comatose for unknown time with full recovery.    Elevated TSH  T4 free WNL  - check TSH in 4-5 weeks.          Diet: Regular Diet Adult    DVT Prophylaxis: heparin gtt  Whitehead Catheter: Not present  Lines: None     Cardiac Monitoring: ACTIVE order. Indication: Stroke, acute (48 hours)  Code Status: Full Code      Clinically Significant Risk Factors                                  # Financial/Environmental Concerns: none         Social Drivers of Health            Disposition Plan     Medically Ready for Discharge: Anticipated Tomorrow  Pending neuro plan           Katie Bay DO  Hospitalist Service  [facility]  Securely message with [employer] (more info)  Text page via [employer] Paging/Directory   ______________________________________________________________________    Interval History   Patient seen and examined. He is doing okay. He had just gotten up to the bathroom and had been up for about 10 minutes when I saw him, no recurrent symptoms while sitting upright. Apparently about an hour later he had recurrent symptoms that resolved with laying flat. We discussed that he would likely be discharged on 3-4 medications.    Physical Exam   Vital Signs: Temp: (!) 96.6  F (35.9  C) Temp src: Oral BP: (!) 169/77 Pulse: 57   Resp: 16 SpO2: 98 % O2 Device: None (Room air)    Weight: 200 lbs 0 oz    Constitutional: Awake, alert, cooperative, no apparent distress  Respiratory: Clear to auscultation bilaterally, no crackles or wheezing  Cardiovascular: Regular  rate and rhythm, normal S1 and S2, and no murmur noted  GI: Normal bowel sounds, soft, non-distended, non-tender  Skin/Integumen: No rashes, no cyanosis, no edema  Other:  Strength 5/5 upper and lower extremities. Face symmetric, tongue midline. No drift. Sensation intact. Speech clear. Finger to nose and heel to shin intact. EOMI.    Medical Decision Making       40 MINUTES SPENT BY ME on the date of service doing chart review, history, exam, documentation & further activities per the note.      Data     I have personally reviewed the following data over the past 24 hrs:    6.3  \   16.0   / 161     139 107 14.9 /  103 (H)   4.2 22 1.52 (H) \     ALT: 22 AST: 23 AP: 72 TBILI: 0.5   ALB: 4.0 TOT PROTEIN: 6.4 LIPASE: N/A     Trop: 11 BNP: N/A       Imaging results reviewed over the past 24 hrs:   Recent Results (from the past 24 hours)   MRA Brain ([place] [facility] [place]) wo Contrast    Narrative    EXAM: MR BRAIN W/O and W CONTRAST, MRA BRAIN ([place] [facility] [place]) W CONTRAST  LOCATION: [facility]  DATE: 12/25/2024    INDICATION: TIA  COMPARISON: CTA head neck 12/25/2024  CONTRAST: 9 mL Gadavist  TECHNIQUE:   1) Routine multiplanar multisequence head MRI without and with intravenous contrast.  2) 3D time-of-flight head MRA without intravenous contrast.    FINDINGS:  HEAD MRI:  INTRACRANIAL CONTENTS: No acute or subacute infarct. No mass, acute hemorrhage, or extra-axial fluid collections. A couple foci of nonspecific T2/FLAIR hyperintensity within the white matter are of doubtful clinical significance and are within the range   of expected for a patient of this age. Signal intensity of the brain parenchyma is otherwise normal. Normal ventricles and sulci. Normal position of the cerebellar tonsils. No pathologic enhancement.    SELLA: No abnormality accounting for technique.    OSSEOUS STRUCTURES/SOFT TISSUES: Normal marrow signal. The major intracranial vascular flow voids are maintained.     ORBITS:  Prior bilateral cataract surgery. Visualized portions of the orbits are otherwise unremarkable.     SINUSES/MASTOIDS: No paranasal sinus mucosal disease. No middle ear or mastoid effusion.     HEAD MRA:   ANTERIOR CIRCULATION: There is multifocal atherosclerosis throughout. This includes moderate stenosis of the left P1 segment posterior cerebral artery. No stenosis, aneurysm, or high flow vascular malformation. Fetal origin of the left posterior cerebral   artery from the anterior circulation.    POSTERIOR CIRCULATION: There are multifocal moderate to severe stenoses of the basilar artery. Portions of the basilar artery are absent of flow related contrast. This is favored to be secondary to high-grade stenosis rather than occlusion given the CTA   findings and relative preserved brain parenchyma. There are additional multifocal mild to moderate stenoses of the bilateral posterior cerebral arteries. No aneurysm, or high flow vascular malformation.        Impression    IMPRESSION:  HEAD MRI:   1.  Normal brain for age.    HEAD MRA:   1.  Absent flow related contrast at the basilar artery. Given CT findings and MRI brain findings this is favored represent severe stenosis, less likely occlusion. Continued clinical follow-up is recommended.  2.  Additional multifocal atherosclerosis including moderate stenosis of the right middle cerebral artery.   MR Brain w/o & w Contrast    Narrative    EXAM: MR BRAIN W/O and W CONTRAST, MRA BRAIN ([place] [facility] [place]) W CONTRAST  LOCATION: [facility]  DATE: 12/25/2024    INDICATION: TIA  COMPARISON: CTA head neck 12/25/2024  CONTRAST: 9 mL Gadavist  TECHNIQUE:   1) Routine multiplanar multisequence head MRI without and with intravenous contrast.  2) 3D time-of-flight head MRA without intravenous contrast.    FINDINGS:  HEAD MRI:  INTRACRANIAL CONTENTS: No acute or subacute infarct. No mass, acute hemorrhage, or extra-axial fluid collections. A couple foci of  nonspecific T2/FLAIR hyperintensity within the white matter are of doubtful clinical significance and are within the range   of expected for a patient of this age. Signal intensity of the brain parenchyma is otherwise normal. Normal ventricles and sulci. Normal position of the cerebellar tonsils. No pathologic enhancement.    SELLA: No abnormality accounting for technique.    OSSEOUS STRUCTURES/SOFT TISSUES: Normal marrow signal. The major intracranial vascular flow voids are maintained.     ORBITS: Prior bilateral cataract surgery. Visualized portions of the orbits are otherwise unremarkable.     SINUSES/MASTOIDS: No paranasal sinus mucosal disease. No middle ear or mastoid effusion.     HEAD MRA:   ANTERIOR CIRCULATION: There is multifocal atherosclerosis throughout. This includes moderate stenosis of the left P1 segment posterior cerebral artery. No stenosis, aneurysm, or high flow vascular malformation. Fetal origin of the left posterior cerebral   artery from the anterior circulation.    POSTERIOR CIRCULATION: There are multifocal moderate to severe stenoses of the basilar artery. Portions of the basilar artery are absent of flow related contrast. This is favored to be secondary to high-grade stenosis rather than occlusion given the CTA   findings and relative preserved brain parenchyma. There are additional multifocal mild to moderate stenoses of the bilateral posterior cerebral arteries. No aneurysm, or high flow vascular malformation.        Impression    IMPRESSION:  HEAD MRI:   1.  Normal brain for age.    HEAD MRA:   1.  Absent flow related contrast at the basilar artery. Given CT findings and MRI brain findings this is favored represent severe stenosis, less likely occlusion. Continued clinical follow-up is recommended.  2.  Additional multifocal atherosclerosis including moderate stenosis of the right middle cerebral artery.   Echocardiogram Complete   Result Value    LVEF  60-65%    Narrative     [id number]  [facility]  [id number]  [id number]^RICHA^VIANEY^C     [facility]  Echocardiography Laboratory  [address] [place] [address]     Name: ANNY LAWLER  MRN: [id number]  : 1945  Study Date: 2024 01:31 PM  Age: 79 yrs  Gender: Male  Patient Location: [facility]  Reason For Study: TIA  Ordering Physician: VIANEY CHAUDHRY  Performed By: Shanthi Briceño     BSA: 2.1 m2  Height: 72 in  Weight: 200 lb  HR: 65  BP: 137/79 mmHg  ______________________________________________________________________________  Procedure  Echocardiogram with two-dimensional, color and spectral Doppler. Definity (NDC  #[id number]) given intravenously.  ______________________________________________________________________________  Interpretation Summary     Left ventricular systolic function is normal.  The visual ejection fraction is 60-65%.  The right ventricle is normal in structure, function and size.  No significant valve dysfunction.  The aortic root is normal size.  The inferior vena cava was normal in size with preserved respiratory  variability.  There is no pericardial effusion.     No major change from study dated 2010.  ______________________________________________________________________________  Left Ventricle  The left ventricle is normal in structure, function and size. There is mild  concentric left ventricular hypertrophy. Left ventricular systolic function is  normal. The visual ejection fraction is 60-65%. Grade I or early diastolic  dysfunction. Normal left ventricular wall motion.     Right Ventricle  The right ventricle is normal in structure, function and size.     Atria  Normal left atrial size. Right atrial size is normal.     Mitral Valve  The mitral valve is normal in structure and function.     Tricuspid Valve  The tricuspid valve is normal in structure and function. Right ventricular  systolic pressure could not be approximated due to inadequate tricuspid  regurgitation.      Aortic Valve  The aortic valve is normal in structure and function.     Pulmonic Valve  The pulmonic valve is normal in structure and function.     Vessels  The aortic root is normal size. Normal size ascending aorta. The inferior vena  cava was normal in size with preserved respiratory variability.     Pericardium  There is no pericardial effusion.     ______________________________________________________________________________  MMode/2D Measurements & Calculations  IVSd: 1.2 cm  LVIDd: 4.5 cm  LVIDs: 2.6 cm  LVPWd: 1.2 cm  FS: 42.8 %     LV mass(C)d: 195.6 grams  LV mass(C)dI: 91.8 grams/m2  Ao root diam: 3.6 cm  asc Aorta Diam: 3.3 cm  LVOT diam: 2.1 cm  LVOT area: 3.5 cm2  Ao root diam index Ht(cm/m): 2.0  Ao root diam index BSA (cm/m2): 1.7  Asc Ao diam index BSA (cm/m2): 1.5  Asc Ao diam index Ht(cm/m): 1.8  RWT: 0.53  TAPSE: 2.0 cm     Doppler Measurements & Calculations  MV E max kong: 56.6 cm/sec  MV A max kong: 66.8 cm/sec  MV E/A: 0.85  MV dec slope: 119.0 cm/sec2  MV dec time: 0.48 sec  Ao V2 max: 119.5 cm/sec  Ao max P.0 mmHg  Ao V2 mean: 91.7 cm/sec  Ao mean PG: 3.7 mmHg  Ao V2 VTI: 28.1 cm  CLINT(I,D): 3.3 cm2  CLINT(V,D): 3.4 cm2  LV V1 max P.4 mmHg  LV V1 max: 116.3 cm/sec  LV V1 VTI: 26.5 cm     SV(LVOT): 92.5 ml  SI(LVOT): 43.4 ml/m2  PA acc time: 0.13 sec  AV Kong Ratio (DI): 0.97  CLINT Index (cm2/m2): 1.5  E/E' av.4  Lateral E/e': 9.0  Medial E/e': 9.8  RV S Kong: 12.2 cm/sec     ______________________________________________________________________________  Report approved by: Jessica Rosa MD on 2024 02:40 PM

## 2024-12-26 NOTE — PROGRESS NOTES
PT: Orders received. Chart reviewed and discussed with care team.  PT not indicated due to patient mobilizing SBA with no symptoms. In order to avoid duplicating services, PT will defer. Defer discharge recommendations to medical team.  Will complete orders.

## 2024-12-26 NOTE — PLAN OF CARE
Goal Outcome Evaluation:  A and O x4. VSS. No pain. NIH = 0, neuros intact. Bedrest with HOB < 30 degrees, pt aware and compliant. Heparin running at 1100 units/hr, recheck hep 10a ordered. Good appetite on regular diet. Voiding in urinal. Family at bedside. Scoring green on aggression screening tool.

## 2024-12-26 NOTE — PROGRESS NOTES
"   12/26/24 1500   Appointment Info   Signing Clinician's Name / Credentials (OT) Mechelle Diop, OTR/L   Rehab Comments (OT) neuro provided verbal ok to continue activity as tolerated now and just monitor symptoms   Living Environment   People in Home spouse   Current Living Arrangements house   Living Environment Comments Pt lives w/ spouse in [place] but was visiting family here in [place]. House has couple steps to get in but then all needs on main level   Self-Care   Usual Activity Tolerance excellent   Current Activity Tolerance good   Equipment Currently Used at Home none   Activity/Exercise/Self-Care Comment IND at baseline w/ ADLs   Instrumental Activities of Daily Living (IADL)   IADL Comments IND at baseline w/ IADLs, enjoys walking every other day a couple miles per pt report   General Information   Onset of Illness/Injury or Date of Surgery 12/25/24   Referring Physician Dave Diop MD   Additional Occupational Profile Info/Pertinent History of Current Problem Per chart review: \"Elio Aguilar is a 79 year old male with PMH prior stroke in 2010 (presented with right hand weakness, found to have small left posterior corona radiata infarct, at that time no significant intracranial stenosis including the basilar artery identified, found to have moderate positive bubble study with Valsalva and per patient this was closed at [place]), denies history of HTN, HLD, DM2 though does not see a doctor regularly and has not checked his blood pressure in over a year, 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  He is not taking any antiplatelet or anticoagulant medications prior to this admission.  Currently he presents after developing acute onset of dizziness described as lightheadedness, diaphoresis, dysarthria without clear focal weakness or facial droop.  Family indicate his speech was very slurred for about 5 minutes before starting to gradually improved.  He states that he sometimes feels this way after having a bowel " "movement and did feel the need to have a bowel movement when this was happening but did not do so until in the ED after the symptoms had improved\"   Cognitive Status Examination   Orientation Status orientation to person, place and time   Cognitive Status Comments no cog concerns, other than pt joking around and not always listening to therapist, moving quickly and sometimes unaware of IV site   Visual Perception   Impact of Vision Impairment on Function (Vision) no vision concerns   Sensory   Sensory Comments no sensory concerns   Range of Motion Comprehensive   Comment, General Range of Motion BUE WFL   Strength Comprehensive (MMT)   Comment, General Manual Muscle Testing (MMT) Assessment BUE WFL   Coordination   Upper Extremity Coordination No deficits were identified   Bed Mobility   Comment (Bed Mobility) SBA   Transfers   Transfer Comments CGA   Balance   Balance Comments pt deviates from path and moves quickly unaware of location of IV at times. but no overt LOB, 20' CGA eval see tx below   Clinical Impression   Criteria for Skilled Therapeutic Interventions Met (OT) Yes, treatment indicated   OT Diagnosis impaired activity tolerance   OT Problem List-Impairments impacting ADL problems related to;activity tolerance impaired;balance;mobility   Assessment of Occupational Performance 1-3 Performance Deficits   Identified Performance Deficits balance, attention   Planned Therapy Interventions (OT) ADL retraining;strengthening;home program guidelines   Clinical Decision Making Complexity (OT) problem focused assessment/low complexity   Risk & Benefits of therapy have been explained evaluation/treatment results reviewed;risks/benefits reviewed;care plan/treatment goals reviewed;current/potential barriers reviewed;participants voiced agreement with care plan;participants included;patient;spouse/significant other  (spouse , daughter, x3 grand children)   OT Total Evaluation Time   OT Eval, Low Complexity Minutes " "(51757) 9   OT Goals   Therapy Frequency (OT) One time eval and treatment   OT Predicted Duration/Target Date for Goal Attainment 12/26/24   OT Goals Transfers;Lower Body Dressing;Aerobic Activity;OT Goal 1   OT: Lower Body Dressing Independent;Goal Met   OT: Transfer Independent;Goal Met   OT: Perform aerobic activity with stable cardiovascular response continuous activity;10 minutes;ambulation;Goal Met   OT: Goal 1 Pt will demonstrate x3 reps of 4 stairs to simulate home set up and tolerance for CV activity.   Interventions   Interventions Quick Adds Therapeutic Activity   Therapeutic Activities   Therapeutic Activity Minutes (30724) 11   Treatment Detail/Skilled Intervention OT: SBA bed mobility, Set up A do doff socks, slighlt difficulty bending over to doff socks however no physical assist provided, no symptom occurence w/ bending over. Pt stands up quickly and not always taking therapist seriously in regards to moving slowly and taking time. CGA amb 100'x2 no AD, veers from path and deviates slighlty however no overt LOB, x3 reps of 4 stairs CGA-SBA, engaged in head turns and slow/fast/stop speed changes in hallway, again moves quickly and cued to slow down. Upon return to room BP stable 169/77 and no symptoms other than head feeling \"a little funny\" but denies dizziness. Pt and family educated on slowing down and not pushing through symptoms if they occur but rather stop and take a break. Pt ok'd for room level mobility w/ family present and hallway mobility with [facility] staff. RN aware and ok'd. Left sitting in chair w/o chair alarm as 3+ family members present.   OT Discharge Planning   OT Plan discharge.   OT Discharge Recommendation (DC Rec) home with assist   OT Rationale for DC Rec Pt functioning near baseline, admitted w/ slight dizziness and slurred speech episode. Now most symptoms resolved and tolerated activity well. Education provided on taking breaks and slowing down especially between transitions " and walking. Pt lives in [place] with wife and hopes to return within the coming days.   OT Brief overview of current status Goals of therapy will be to address safe mobility and make recs for d/c to next level of care. Pt and RN will continue to follow all falls risk precautions as documented by RN staff while hospitalized  (SBA no AD, completed stairs and head turns, no additional IP OT needs identified)   Total Session Time   Timed Code Treatment Minutes 11   Total Session Time (sum of timed and untimed services) 20

## 2024-12-26 NOTE — PLAN OF CARE
Goal Outcome Evaluation:      Plan of Care Reviewed With: patient    Overall Patient Progress: no changeOverall Patient Progress: no change         Pt here from [place] visiting family for the holidays with surred speech and dizziness/feeling off balance, all resolved at this time. A&O x4. Neuros intact and stable, assessed Q2 hours this shift per orders. VSS. Tele sinus mariluz. Reg diet, thin liquids. Takes pills whole. Strict bedrest - HOB to always be less than 30 degrees, if pt starts having neuro symptoms then completely flat. Denies pain. Pt scoring green on the Aggression Stop Light Tool. Discharge plan pending. Heparin currently running at 1050 unit(s)/hr - redraw around 1130 today.

## 2024-12-26 NOTE — PLAN OF CARE
Goal Outcome Evaluation:      Plan of Care Reviewed With: patient          Outcome Evaluation: Discharge plan pending PT evaluation but anticipate home.  Patient establish care with a PCP upon return to home in [place].

## 2024-12-27 ENCOUNTER — APPOINTMENT (OUTPATIENT)
Dept: CT IMAGING | Facility: CLINIC | Age: 79
DRG: 023 | End: 2024-12-27
Attending: PHYSICIAN ASSISTANT
Payer: MEDICARE

## 2024-12-27 LAB
ANION GAP SERPL CALCULATED.3IONS-SCNC: 12 MMOL/L (ref 7–15)
BUN SERPL-MCNC: 14.8 MG/DL (ref 8–23)
CALCIUM SERPL-MCNC: 8.8 MG/DL (ref 8.8–10.4)
CHLORIDE SERPL-SCNC: 108 MMOL/L (ref 98–107)
CREAT SERPL-MCNC: 1.36 MG/DL (ref 0.67–1.17)
EGFRCR SERPLBLD CKD-EPI 2021: 52 ML/MIN/1.73M2
ERYTHROCYTE [DISTWIDTH] IN BLOOD BY AUTOMATED COUNT: 12.1 % (ref 10–15)
GLUCOSE SERPL-MCNC: 95 MG/DL (ref 70–99)
HCO3 SERPL-SCNC: 20 MMOL/L (ref 22–29)
HCT VFR BLD AUTO: 43.4 % (ref 40–53)
HGB BLD-MCNC: 15.8 G/DL (ref 13.3–17.7)
MCH RBC QN AUTO: 32.7 PG (ref 26.5–33)
MCHC RBC AUTO-ENTMCNC: 36.4 G/DL (ref 31.5–36.5)
MCV RBC AUTO: 90 FL (ref 78–100)
PLATELET # BLD AUTO: 146 10E3/UL (ref 150–450)
POTASSIUM SERPL-SCNC: 4.1 MMOL/L (ref 3.4–5.3)
RBC # BLD AUTO: 4.83 10E6/UL (ref 4.4–5.9)
SODIUM SERPL-SCNC: 140 MMOL/L (ref 135–145)
UFH PPP CHRO-ACNC: 0.32 IU/ML
WBC # BLD AUTO: 6.6 10E3/UL (ref 4–11)

## 2024-12-27 PROCEDURE — 250N000013 HC RX MED GY IP 250 OP 250 PS 637: Performed by: PHYSICIAN ASSISTANT

## 2024-12-27 PROCEDURE — 250N000011 HC RX IP 250 OP 636: Performed by: PHYSICIAN ASSISTANT

## 2024-12-27 PROCEDURE — 80048 BASIC METABOLIC PNL TOTAL CA: CPT | Performed by: HOSPITALIST

## 2024-12-27 PROCEDURE — 250N000011 HC RX IP 250 OP 636: Performed by: EMERGENCY MEDICINE

## 2024-12-27 PROCEDURE — 99233 SBSQ HOSP IP/OBS HIGH 50: CPT | Performed by: HOSPITALIST

## 2024-12-27 PROCEDURE — 85520 HEPARIN ASSAY: CPT | Performed by: STUDENT IN AN ORGANIZED HEALTH CARE EDUCATION/TRAINING PROGRAM

## 2024-12-27 PROCEDURE — 250N000013 HC RX MED GY IP 250 OP 250 PS 637: Performed by: HOSPITALIST

## 2024-12-27 PROCEDURE — 85027 COMPLETE CBC AUTOMATED: CPT | Performed by: HOSPITALIST

## 2024-12-27 PROCEDURE — 250N000009 HC RX 250: Performed by: PHYSICIAN ASSISTANT

## 2024-12-27 PROCEDURE — 99233 SBSQ HOSP IP/OBS HIGH 50: CPT | Mod: FS | Performed by: NURSE PRACTITIONER

## 2024-12-27 PROCEDURE — 70496 CT ANGIOGRAPHY HEAD: CPT | Mod: MG

## 2024-12-27 PROCEDURE — 120N000013 HC R&B IMCU

## 2024-12-27 PROCEDURE — 36415 COLL VENOUS BLD VENIPUNCTURE: CPT | Performed by: HOSPITALIST

## 2024-12-27 RX ORDER — ATORVASTATIN CALCIUM 80 MG/1
80 TABLET, FILM COATED ORAL EVERY EVENING
Qty: 30 TABLET | Refills: 1 | Status: SHIPPED | OUTPATIENT
Start: 2024-12-27

## 2024-12-27 RX ORDER — ASPIRIN 81 MG/1
81 TABLET ORAL DAILY
Qty: 30 TABLET | Refills: 1 | Status: SHIPPED | OUTPATIENT
Start: 2024-12-28

## 2024-12-27 RX ORDER — HEPARIN SODIUM 10000 [USP'U]/100ML
0-5000 INJECTION, SOLUTION INTRAVENOUS CONTINUOUS
Status: DISCONTINUED | OUTPATIENT
Start: 2024-12-27 | End: 2024-12-27

## 2024-12-27 RX ORDER — HEPARIN SODIUM 10000 [USP'U]/100ML
0-5000 INJECTION, SOLUTION INTRAVENOUS CONTINUOUS
Status: ACTIVE | OUTPATIENT
Start: 2024-12-27 | End: 2024-12-27

## 2024-12-27 RX ORDER — IOPAMIDOL 755 MG/ML
67 INJECTION, SOLUTION INTRAVASCULAR ONCE
Status: COMPLETED | OUTPATIENT
Start: 2024-12-27 | End: 2024-12-27

## 2024-12-27 RX ADMIN — APIXABAN 5 MG: 5 TABLET, FILM COATED ORAL at 18:38

## 2024-12-27 RX ADMIN — HEPARIN SODIUM 1050 UNITS/HR: 10000 INJECTION, SOLUTION INTRAVENOUS at 04:20

## 2024-12-27 RX ADMIN — SODIUM CHLORIDE 100 ML: 9 INJECTION, SOLUTION INTRAVENOUS at 08:50

## 2024-12-27 RX ADMIN — IOPAMIDOL 67 ML: 755 INJECTION, SOLUTION INTRAVENOUS at 08:50

## 2024-12-27 RX ADMIN — ASPIRIN 81 MG: 81 TABLET, COATED ORAL at 09:01

## 2024-12-27 RX ADMIN — ATORVASTATIN CALCIUM 80 MG: 80 TABLET, FILM COATED ORAL at 20:03

## 2024-12-27 ASSESSMENT — ACTIVITIES OF DAILY LIVING (ADL)
ADLS_ACUITY_SCORE: 46

## 2024-12-27 NOTE — CONSULTS
Patient has Medicare D through [employer].    Xarelto/Eliquis  2024:  --$441 for 1 mo, or Discharge Pharmacy can provide 1 mo free using one-time  voucher.    2025:  --Patient is will pay 100% of the first $590 in drug costs (first month will be as much as $584)  --Subsequent fills will be $146/mo.  --If/when total out of pocket drug costs exceed $2000, patient will pay $0 for all covered drugs for the remainder of the year.    Dabigatran  2024:  $109/mo.    2025:  --Patient is will pay 100% of the first $590 in drug costs ($176/mo until fulfilled)  --Subsequent fills will be $70/mo.  --If/when total out of pocket drug costs exceed $2000, patient will pay $0 for all covered drugs for the remainder of the year.    Bethanie Hackett  Pharmacy Technician/Liaison, Discharge Pharmacy   [phone] (voice or text)  [email or web address]  Pharmacy test claims are estimates and may not reflect final costs.   Suggested alternatives aim to be cost-effective but may not be therapeutically equivalent as this consult is informational and does not constitute medical advice.   Clinical decisions should be made by qualified healthcare providers.

## 2024-12-27 NOTE — PROGRESS NOTES
[facility]    Vascular Neurology Progress Note    Interval History     Per chart review, no recurrent episodes of neuro changes overnight.  Repeat CTA with some improvement in flow through basilar artery.  Plan for transition to DOAC this evening.    Hospital Course     Chief complaint: Dizziness (Sudden onset dizziness/sweating/family reported some slurred speech that was brief and gone by the time medics arrived. Was just sitting when episode happened.  No dizziness w change of position.  Off balance. +5 strength. 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 Presented 12/25 w/ acute onset lightheadedness, diaphoresis, dysarthria without clear focal weakness or facial droop.    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  Recommend allowing for moderate permissive hypertension until he follows up with PCP.  If he remains stable, can slowly lower BP in the coming weeks/months.  - Okay to transition to Eliquis 5 mg twice daily today from a stroke standpoint.  Discussed with hospitalist  - Continue aspirin 81 mg daily for stroke prevention  - Statin: Atorvastatin 80 mg, LDL goal 40-70  - PT/OT/SLP  - Stroke Education  - Depression Screen  - Refer for ANGEL eval at discharge  - Euthermia, Euglycemia    DVT Prophylaxis: heparin    Patient Follow-up    -He will need to establish  "and follow-up with PCP upon return to [place] and be referred for follow-up with stroke neurology locally.  Would recommend repeat imaging in 6 to 8 weeks to help determine duration of plus aspirin.      We will continue to follow.       Katrin Malcolm, CNP  Vascular Neurology    To page me or covering stroke neurology team member, click here: [facility]  Choose \"On Call\" tab at top, then select \"NEUROLOGY/ALL SITES\" from middle drop-down box, press Enter, then look for \"stroke\" or \"telestroke\" for your site.    Physical Examination     Temp: 98  F (36.7  C) Temp src: Oral BP: 138/77 Pulse: 57   Resp: 14 SpO2: 95 % O2 Device: None (Room air)      Neurologic  Mental Status:  alert, oriented x 3, follows commands, speech clear and fluent, naming and repetition normal  Cranial Nerves:  visual fields intact, EOMI with normal smooth pursuit, facial sensation intact and symmetric, facial movements symmetric, hearing not formally tested but intact to conversation, no dysarthria, shoulder shrug strong bilaterally, tongue protrusion midline  Motor:  normal muscle tone and bulk, no abnormal movements, able to move all limbs spontaneously, no pronator drift  Reflexes:   Deferred  Sensory:  light touch sensation intact and symmetric throughout upper and lower extremities, no extinction on double simultaneous stimulation   Coordination:  normal finger-to-nose and heel-to-shin bilaterally without dysmetria  Station/Gait:  deferred    Stroke Scales       NIHSS  1a. Level of Consciousness 0-->Alert, keenly responsive   1b. LOC Questions 0-->Answers both questions correctly   1c. LOC Commands 0-->Performs both tasks correctly   2.   Best Gaze 0-->Normal   3.   Visual 0-->No visual loss   4.   Facial Palsy 0-->Normal symmetrical movements   5a. Motor Arm, Left 0-->No drift, limb holds 90 (or 45) degrees for full 10 secs   5b. Motor Arm, Right 0-->No drift, limb holds 90 (or 45) degrees for full 10 secs   6a. Motor Leg, Left 0-->No drift, " leg holds 30 degree position for full 5 secs   6b. Motor Leg, right 0-->No drift, leg holds 30 degree position for full 5 secs   7.   Limb Ataxia 0-->Absent   8.   Sensory 0-->Normal, no sensory loss   9.   Best Language 0-->No aphasia, normal   10. Dysarthria 0-->Normal   11. Extinction and Inattention  0-->No abnormality   Total 0 (12/27/24 1300)       Imaging/Labs   (Bolded imaging and labs new and/or personally reviewed or re-reviewed by me today)    MRI and/or Head CT MRI: No acute stroke   Intracranial Vasculature CTA 12/27: Formally read as stable from prior, however feel there is some improvement in basilar stenosis on stroke team review.    CTA: Multifocal high-grade stenosis/near occlusion of the proximal/mid basilar, bilateral PCA stenosis, moderate left M1 stenosis, moderate supraclinoid right ICA stenosis     MRA: Absent flow related contrast at the basilar artery, multifocal iCAD   Cervical Vasculature CTA: 40 to 50% right cervical ICA stenosis      Echocardiogram EF 60 to 65%, no regional wall motion abnormalities, normal left atrial size   EKG/Telemetry SR   Other Testing Not Applicable      LDL  12/25/2024: 131 mg/dL   A1C  12/25/2024: 5.1 %   Troponin 12/25/2024: 11 ng/L     Other labs reviewed by me today:  CBC, BMP    Time Spent on this Encounter   Billing: I have personally spent a total of 20 minutes providing care today, time spent in reviewing medical records and devising the plan as recorded above.

## 2024-12-27 NOTE — PLAN OF CARE
Goal Outcome Evaluation:  A and O x4. Neuros intact, NIH = 0. Pt reported two episodes of slurred/slow speech, neither witnessed by staff but second episode witnessed by family. Neurology MD aware, pt and family advised to call for an RN to assess during any other future episodes. Tolerating increased activity, up ad pat with SBA. VSS. Good appetite on regular diet. Voiding without difficulty, BM today. Heparin drip running at 1050 units/hr with recheck heparin 10a scheduled for 1908. Scoring green on aggression screening tool. Plan is for CTA with contrast tomorrow morning. Family at bedside, met with neurologist and aware of plan.

## 2024-12-27 NOTE — PLAN OF CARE
Goal Outcome Evaluation:         Reason for Admission: concern for TIA vs CVA    Cognitive/Mentation: A/Ox 4  Neuros/CMS: Intact  VS: VSS on RA.   Tele: ***.  /GI: Continent. Last BM ***.   Pulmonary: LS clear.  Pain: denies.     Drains/Lines: heparin at 1050u/hr, recheck in a.m.  Skin: intact  Activity: SBA  Diet: regular with thin liquids. Takes pills whole.     Therapies recs: pending  Discharge: pending    Aggression Stoplight Tool: green    End of shift summary: ***

## 2024-12-27 NOTE — PLAN OF CARE
/77 (BP Location: Left arm)   Pulse 57   Temp 98  F (36.7  C) (Oral)   Resp 14   Wt 90.7 kg (200 lb)   SpO2 95%     Patient name: Elio Aguilar    Summary: Patient here with TIA and stenosis, doing well today, no neurologic deficits, very pleasant, oriented. Taken for 1 walk. Plan for discharge tomorrow.    Jonathan Ware RN  [facility]

## 2024-12-27 NOTE — PROGRESS NOTES
[facility]    Medicine Progress Note - Hospitalist Service    Date of Admission:  12/25/2024    Assessment & Plan   lEio Aguilar is a 79 year old male with history of HTN, HLD, ANGEL, Reye's Syndrome who was admitted on 12/25/2024 with concern for posterior circulation TIA vs CVA.     Concern for posterior circulation TIA vs CVA  High-grade stenosis of proximal-mid basilar artery  High grade stenosis of L PCA P1 segment  ICAD involving basilar artery, L PCA, R PCA, L MCA, supraclinoid R ICA   Hx of L posterior corona radiata CVA (2010)  Hx of PFO s/p closure (2010, [place])  Hx of tobacco use  Patient with history of CVA in setting of PFO presenting with acute onset dizziness and speech changes found to have high-grade basilar artery and left PCA P1 segment stenosis without acute hemorrhage or ischemic infarct on CT.  Symptoms quickly resolved; stroke neurology involved in the ED and patient was loaded with aspirin and Plavix.  Remains hemodynamically stable with no ongoing deficits on exam. In setting of prior CVA and suspected TIA, patient will need aggressive risk factor modification.   *12/25 MRI head: normal for age  *12/25 MRA Head: absent flow at basilar artery, likely severe stenosis less likely occlusion. Additional multifocal atherosclerosis including moderate stenosis of R MCA.  **12/26 recurrent symptoms ~1h after sitting upright, resolved while laying flat  *12/26 Echo: EF 60-65%, RV normal. IVC normal. Same as 2010 study  *12/27 CTA: some improvement in flow in basilar artery  -transition from heparin gtt to eliquis BID on 12/27 evening  - continue aspirin 81mg daily  -Permissive HTN, goal SBP <220, allow moderate hypertension and follow up with PCP with daily BP checks upon return to [place]  - atorvastatin 80mg daily  -Neurochecks q4h  -telemetry  -therapies recommend home with assist  -Outpatient sleep medicine referral on discharge  -Stroke Neurology following, recommends repeat  CT imaging in 6-8 weeks with neurology follow-up at that time.  - follow up with PCP upon return to [place] with CBC/CMP in 2-4 weeks.     CKD Stage 3a  Prior creatinine levels around 1.3 (in 2010), creatinine 1.44 on admission. Cr remains stable 1.3-1.5 this admit.     HTN, suspected - No PTA medications. Allowing permissive HTN as above.  HLD - Total Cholesterol 222, , HDL 38, trigs 265.  ANGEL - No PTA CPAP use. Sleep Medicine referral on discharge.  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 Reportedly comatose for unknown time with full recovery.    Elevated TSH  T4 free WNL  - check TSH in 4-5 weeks.          Diet: Regular Diet Adult  Diet    DVT Prophylaxis: heparin gtt  Whitehead Catheter: Not present  Lines: None     Cardiac Monitoring: ACTIVE order. Indication: Stroke, acute (48 hours)  Code Status: Full Code      Clinically Significant Risk Factors          # Hyperchloremia: Highest Cl = 108 mmol/L in last 2 days, will monitor as appropriate                         # Overweight: Estimated body mass index is 27.12 kg/m  as calculated from the following:    Height as of this encounter: 1.829 m (6').    Weight as of this encounter: 90.7 kg (200 lb)., PRESENT ON ADMISSION     # Financial/Environmental Concerns: none         Social Drivers of Health            Disposition Plan     Medically Ready for Discharge: Anticipated Tomorrow  Pending no further symptoms. More convenient to discharge early in AM than late in day on 12/27           Katie Bay DO  Hospitalist Service  [facility]  Securely message with [employer] (more info)  Text page via [employer] [place] Paging/Directory   ______________________________________________________________________    Interval History   Patient seen and examined. No further issues overnight with recurrent symptoms. Up and about taking multiple walks. Reviewed medication plan on discharge, reviewed cost of medications at  length as well. Eliquis will be expensive but they are willing to go for it to start and hopefully can find cheaper in 2025 with a supplemental insurance through his wife.  Discussed with neuro    Physical Exam   Vital Signs: Temp: 97.8  F (36.6  C) Temp src: Oral BP: 136/75 Pulse: 55   Resp: 18 SpO2: 99 % O2 Device: None (Room air)    Weight: 200 lbs 0 oz    Constitutional: Awake, alert, cooperative, no apparent distress  Respiratory: Clear to auscultation bilaterally, no crackles or wheezing  Cardiovascular: Regular rate and rhythm, normal S1 and S2, and no murmur noted  GI: Normal bowel sounds, soft, non-distended, non-tender  Skin/Integumen: No rashes, no cyanosis, no edema  Other:  Strength intact, speech clear.    Medical Decision Making       50 MINUTES SPENT BY ME on the date of service doing chart review, history, exam, documentation & further activities per the note.      Data     I have personally reviewed the following data over the past 24 hrs:    6.6  \   15.8   / 146 (L)     140 108 (H) 14.8 /  95   4.1 20 (L) 1.36 (H) \       Imaging results reviewed over the past 24 hrs:   Recent Results (from the past 24 hours)   CT Head w/o Contrast    Narrative    CTA HEAD NECK WITH CONTRAST;  CT HEAD WITHOUT CONTRAST  12/27/2024 9:05 AM     HISTORY:  Follow up basilar severe stenosis/near occlusion.       COMPARISON: MRI and CTs from 12/25/2024    TECHNIQUE:  HEAD CT: Using multidetector thin collimation helical acquisition  technique, axial, coronal and sagittal CT images from the skull base  to the vertex were obtained without intravenous contrast.   (topogram) image(s) also obtained and reviewed.  HEAD and NECK CTA: During rapid bolus intravenous injection of  nonionic contrast material, axial images were obtained using thin  collimation multidetector helical technique from the base of the upper  aortic arch through the [place]. This CT angiogram data was  reconstructed at thin intervals with  mild overlap. Images were sent to  the 3D workstation, and 3D reconstructions were obtained. The axial  source images, multiplanar reformations, 3D reconstructions in both  maximum intensity projection display and volume rendered models were  reviewed, with reconstructions performed by the technologist and the  radiologist.    CONTRAST: 67 cc [id number]    FINDINGS:    Head CT demonstrates no intracranial hemorrhage, mass effect, or  midline shift. Gray/white matter differentiation in both cerebral  hemispheres is preserved. Ventricles are proportionate to the cerebral  sulci. The basal cisterns are clear. Mild generalized cerebral  atrophy.    The bony calvaria and the bones of the skull base are normal. The  visualized portions of the paranasal sinuses and mastoid air cells are  clear    Head CTA demonstrates small basilar artery with multifocal areas of  noncontrast filling. Short segment moderate to severe stenosis at the  origin of the left MCA. Moderate focal stenosis in the proximal M1  segment, as well. Short segment mild to moderate stenosis in the P1  segment of the left PCA. Unchanged mild to moderate focal stenosis in  the supraclinoid right ICA.    Neck CTA demonstrates patent major cervical arteries. Unchanged 40-45%   stenosis in the proximal right ICA, secondary to mixed calcified and  soft plaque. There is mixed calcified and soft plaque at the left ICA  bifurcation without significant stenosis. Hypoplastic left vertebral  artery terminates as PICA.    No acute finding in the visualized neck soft tissues, or in the  superior mediastinum/thorax.      Impression    IMPRESSION:    1. Head CT: No acute intracranial pathology.  2. Head CTA: Stable findings compared to 12/25/2024.  a. Small basilar artery with multifocal areas of absent contrast  opacification throughout its course, representing high-grade stenosis.    b. Short segment moderate to severe stenosis at the origin of the left  MCA and  moderate focal stenosis in the proximal M1 segment.  c. Mild to moderate focal stenosis in the supraclinoid right ICA.  3. Neck CTA:  a. Patent major cervical arteries.   b. Unchanged 40-45% stenosis in the proximal right ICA, secondary to  mixed calcified and soft plaque.     JAMMIE FARRAR MD         SYSTEM ID:  [id number]   CTA Head Neck w Contrast    Narrative    CTA HEAD NECK WITH CONTRAST;  CT HEAD WITHOUT CONTRAST  12/27/2024 9:05 AM     HISTORY:  Follow up basilar severe stenosis/near occlusion.       COMPARISON: MRI and CTs from 12/25/2024    TECHNIQUE:  HEAD CT: Using multidetector thin collimation helical acquisition  technique, axial, coronal and sagittal CT images from the skull base  to the vertex were obtained without intravenous contrast.   (topogram) image(s) also obtained and reviewed.  HEAD and NECK CTA: During rapid bolus intravenous injection of  nonionic contrast material, axial images were obtained using thin  collimation multidetector helical technique from the base of the upper  aortic arch through the [place]. This CT angiogram data was  reconstructed at thin intervals with mild overlap. Images were sent to  the 3D workstation, and 3D reconstructions were obtained. The axial  source images, multiplanar reformations, 3D reconstructions in both  maximum intensity projection display and volume rendered models were  reviewed, with reconstructions performed by the technologist and the  radiologist.    CONTRAST: 67 cc [id number]    FINDINGS:    Head CT demonstrates no intracranial hemorrhage, mass effect, or  midline shift. Gray/white matter differentiation in both cerebral  hemispheres is preserved. Ventricles are proportionate to the cerebral  sulci. The basal cisterns are clear. Mild generalized cerebral  atrophy.    The bony calvaria and the bones of the skull base are normal. The  visualized portions of the paranasal sinuses and mastoid air cells are  clear    Head CTA  demonstrates small basilar artery with multifocal areas of  noncontrast filling. Short segment moderate to severe stenosis at the  origin of the left MCA. Moderate focal stenosis in the proximal M1  segment, as well. Short segment mild to moderate stenosis in the P1  segment of the left PCA. Unchanged mild to moderate focal stenosis in  the supraclinoid right ICA.    Neck CTA demonstrates patent major cervical arteries. Unchanged 40-45%   stenosis in the proximal right ICA, secondary to mixed calcified and  soft plaque. There is mixed calcified and soft plaque at the left ICA  bifurcation without significant stenosis. Hypoplastic left vertebral  artery terminates as PICA.    No acute finding in the visualized neck soft tissues, or in the  superior mediastinum/thorax.      Impression    IMPRESSION:    1. Head CT: No acute intracranial pathology.  2. Head CTA: Stable findings compared to 12/25/2024.  a. Small basilar artery with multifocal areas of absent contrast  opacification throughout its course, representing high-grade stenosis.    b. Short segment moderate to severe stenosis at the origin of the left  MCA and moderate focal stenosis in the proximal M1 segment.  c. Mild to moderate focal stenosis in the supraclinoid right ICA.  3. Neck CTA:  a. Patent major cervical arteries.   b. Unchanged 40-45% stenosis in the proximal right ICA, secondary to  mixed calcified and soft plaque.     JAMMIE FARRAR MD         SYSTEM ID:  [id number]

## 2024-12-28 LAB
ERYTHROCYTE [DISTWIDTH] IN BLOOD BY AUTOMATED COUNT: 12.2 % (ref 10–15)
HCT VFR BLD AUTO: 48.5 % (ref 40–53)
HGB BLD-MCNC: 17.4 G/DL (ref 13.3–17.7)
MCH RBC QN AUTO: 32.8 PG (ref 26.5–33)
MCHC RBC AUTO-ENTMCNC: 35.9 G/DL (ref 31.5–36.5)
MCV RBC AUTO: 92 FL (ref 78–100)
PLATELET # BLD AUTO: 179 10E3/UL (ref 150–450)
RBC # BLD AUTO: 5.3 10E6/UL (ref 4.4–5.9)
WBC # BLD AUTO: 7.4 10E3/UL (ref 4–11)

## 2024-12-28 PROCEDURE — 120N000001 HC R&B MED SURG/OB

## 2024-12-28 PROCEDURE — 99239 HOSP IP/OBS DSCHRG MGMT >30: CPT | Performed by: HOSPITALIST

## 2024-12-28 PROCEDURE — 36415 COLL VENOUS BLD VENIPUNCTURE: CPT | Performed by: HOSPITALIST

## 2024-12-28 PROCEDURE — 250N000013 HC RX MED GY IP 250 OP 250 PS 637: Performed by: PHYSICIAN ASSISTANT

## 2024-12-28 PROCEDURE — A9585 GADOBUTROL INJECTION: HCPCS | Performed by: NURSE PRACTITIONER

## 2024-12-28 PROCEDURE — 250N000013 HC RX MED GY IP 250 OP 250 PS 637: Performed by: HOSPITALIST

## 2024-12-28 PROCEDURE — 250N000013 HC RX MED GY IP 250 OP 250 PS 637: Performed by: PSYCHIATRY & NEUROLOGY

## 2024-12-28 PROCEDURE — 3E043PZ INTRODUCTION OF PLATELET INHIBITOR INTO CENTRAL VEIN, PERCUTANEOUS APPROACH: ICD-10-PCS | Performed by: INTERNAL MEDICINE

## 2024-12-28 PROCEDURE — 99232 SBSQ HOSP IP/OBS MODERATE 35: CPT | Mod: FS | Performed by: NURSE PRACTITIONER

## 2024-12-28 PROCEDURE — 85014 HEMATOCRIT: CPT | Performed by: HOSPITALIST

## 2024-12-28 PROCEDURE — 255N000002 HC RX 255 OP 636: Performed by: NURSE PRACTITIONER

## 2024-12-28 RX ORDER — GADOBUTROL 604.72 MG/ML
9 INJECTION INTRAVENOUS ONCE
Status: COMPLETED | OUTPATIENT
Start: 2024-12-28 | End: 2024-12-28

## 2024-12-28 RX ADMIN — ASPIRIN 81 MG: 81 TABLET, COATED ORAL at 09:28

## 2024-12-28 RX ADMIN — TICAGRELOR 180 MG: 90 TABLET ORAL at 16:21

## 2024-12-28 RX ADMIN — GADOBUTROL 9 ML: 604.72 INJECTION INTRAVENOUS at 15:44

## 2024-12-28 RX ADMIN — ATORVASTATIN CALCIUM 80 MG: 80 TABLET, FILM COATED ORAL at 20:04

## 2024-12-28 RX ADMIN — APIXABAN 5 MG: 5 TABLET, FILM COATED ORAL at 09:28

## 2024-12-28 RX ADMIN — APIXABAN 5 MG: 5 TABLET, FILM COATED ORAL at 20:04

## 2024-12-28 ASSESSMENT — ACTIVITIES OF DAILY LIVING (ADL)
ADLS_ACUITY_SCORE: 46

## 2024-12-28 NOTE — PLAN OF CARE
/68   Pulse 58   Temp 98.3  F (36.8  C) (Oral)   Resp 16   Ht 1.829 m (6')   Wt 90.7 kg (200 lb)   SpO2 96%   BMI 27.12 kg/m      Patient name: Elio Aguilar    Summary: Patient here with TIA. Was going to discharge today, however while talking with family developed slurred speech similar to previous episodes- which prompted us to have neurology look at him again. My assessment is mostly the same though his voice did sound a bit different like he has something stuck in his throat. This resolved by laying flat. Of note he also had a crying/sobbing episode with family, which is out of character for him. Neurology has decided to keep him one more night for monitoring, as well as get an MRI.    Jonathan Ware, IZABELA  [facility]     ADULT ANNUAL [facility]    NAME: Gerda Davis  AGE: 19 y o  SEX: female  : 2004     DATE: 2022     Assessment and Plan:     Problem List Items Addressed This Visit        Cardiovascular and Mediastinum    Migraine     - No focal deficits on neuro exam today   - Recommended blood work and focusing on staying hydrated   - Continued Excedrin PRN   - Offered daily preventative medication, she declines at this time          Relevant Orders    CBC and differential    Comprehensive metabolic panel    TSH, 3rd generation with Free T4 reflex       Nervous and Auditory    Benign paroxysmal positional vertigo of right ear     - Positive [place] right   - Recommended trial PT vestibular therapy          Relevant Orders    CBC and differential    Comprehensive metabolic panel    TSH, 3rd generation with Free T4 reflex    Ambulatory Referral to Physical Therapy       Other    Crohn's disease ([facility] [address] )    Fatigue     - Recommended sleep medicine consult/testing for sleep apnea          Relevant Orders    Ambulatory Referral to Sleep Medicine    TSH, 3rd generation with Free T4 reflex      Other Visit Diagnoses     Annual physical exam    -  Primary    Encounter for lipid screening for cardiovascular disease        Relevant Orders    Lipid Panel with Direct LDL reflex          Immunizations and preventive care screenings were discussed with patient today  Appropriate education was printed on patient's after visit summary  Counseling:  Alcohol/drug use: discussed moderation in alcohol intake, the recommendations for healthy alcohol use, and avoidance of illicit drug use  Dental Health: discussed importance of regular tooth brushing, flossing, and dental visits  Injury prevention: discussed safety/seat belts, safety helmets, smoke detectors, carbon dioxide detectors, and smoking near bedding or upholstery    Sexual health: discussed sexually transmitted diseases, partner selection, use of condoms, avoidance of unintended pregnancy, and contraceptive alternatives  · Exercise: the importance of regular exercise/physical activity was discussed  Recommend exercise 3-5 times per week for at least 30 minutes  BMI Counseling: Body mass index is 33 47 kg/m²  The BMI is above normal  Nutrition recommendations include decreasing portion sizes, encouraging healthy choices of fruits and vegetables, decreasing fast food intake, consuming healthier snacks, moderation in carbohydrate intake, increasing intake of lean protein and reducing intake of saturated and trans fat  Exercise recommendations include moderate physical activity 150 minutes/week and exercising 3-5 times per week  Rationale for BMI follow-up plan is due to patient being overweight or obese  No follow-ups on file  Chief Complaint:     Chief Complaint   Patient presents with   • Follow-up      History of Present Illness:     Adult Annual Physical   Patient here for a comprehensive physical exam  The patient reports - see problem-focused note  Diet and Physical Activity  · Diet/Nutrition: poor diet, frequent junk food and limited fruits/vegetables  · Exercise: no formal exercise  Depression Screening  PHQ-2/9 Depression Screening    Little interest or pleasure in doing things: 0 - not at all  Feeling down, depressed, or hopeless: 0 - not at all  Trouble falling or staying asleep, or sleeping too much: 0 - not at all  Feeling tired or having little energy: 0 - not at all  Poor appetite or overeatin - not at all  Feeling bad about yourself - or that you are a failure or have let yourself or your family down: 0 - not at all  Trouble concentrating on things, such as reading the newspaper or watching television: 0 - not at all  Moving or speaking so slowly that other people could have noticed   Or the opposite - being so fidgety or restless that you have been moving around a lot more than usual: 0 - not at all  Thoughts that you would be better off dead, or of hurting yourself in some way: 0 - not at all  PHQ-9 Score: 0   PHQ-9 Interpretation: No or Minimal depression          General Health  · Sleep: sleeps well  · Hearing: normal - bilateral   · Vision: goes for regular eye exams and wears glasses  Notices vision a little blurrier, thinks needs new prescription  · Dental: no dental visits for >1 year  /GYN Health  · Last menstrual period: Couple days ago, irregular period but recently switching from Depo to Micronor, hasn't started yet  · Contraceptive method: oral contraceptives  · Dr Pedro Human  Review of Systems:     Review of Systems  - see problem-focused note       Past Medical History:     Past Medical History:   Diagnosis Date   • Asthma    • [facility]       Past Surgical History:     Past Surgical History:   Procedure Laterality Date   • COLONOSCOPY  08/13/2021   • EAR TUBE REMOVAL     • TONSILLECTOMY     • TYMPANOSTOMY TUBE PLACEMENT        Social History:     Social History     Socioeconomic History   • Marital status: Single     Spouse name: None   • Number of children: None   • Years of education: None   • Highest education level: None   Occupational History   • None   Tobacco Use   • Smoking status: Never Smoker   • Smokeless tobacco: Never Used   Substance and Sexual Activity   • Alcohol use: Never   • Drug use: Never   • Sexual activity: None   Other Topics Concern   • None   Social History Narrative   • None     Social Determinants of Health     Financial Resource Strain: Not on file   Food Insecurity: Not on file   Transportation Needs: Not on file   Physical Activity: Not on file   Stress: Not on file   Social Connections: Not on file   Intimate Partner Violence: Not on file   Housing Stability: Not on file      Family History:     Family History   Problem Relation Age of Onset   • Other Mother         possibly had breast cancer • Hypertension Father       Current Medications:     Current Outpatient Medications   Medication Sig Dispense Refill   • albuterol (ProAir HFA) 90 mcg/act inhaler Inhale 2 puffs every 6 (six) hours as needed for wheezing 8 5 g 5   • norethindrone (MICRONOR) 0 35 MG tablet Take 1 tablet by mouth daily       No current facility-administered medications for this visit  Allergies:     No Known Allergies   Physical Exam:     /80   Pulse 82   Temp 98 3 °F (36 8 °C) (Tympanic)   Resp 16   Ht 5' 4" (1 626 m)   Wt 88 5 kg (195 lb)   SpO2 98%   BMI 33 47 kg/m²     Physical Exam  - see problem-focused note        [facility] PRIMARY CARE

## 2024-12-28 NOTE — PROGRESS NOTES
[facility]    Medicine Progress Note - Hospitalist Service    Date of Admission:  12/25/2024    Assessment & Plan   Elio Aguilar is a 79 year old male with history of HTN, HLD, ANGEL, Reye's Syndrome who was admitted on 12/25/2024 with concern for posterior circulation TIA vs CVA.     Concern for posterior circulation TIA vs CVA  High-grade stenosis of proximal-mid basilar artery  High grade stenosis of L PCA P1 segment  ICAD involving basilar artery, L PCA, R PCA, L MCA, supraclinoid R ICA   Hx of L posterior corona radiata CVA (2010)  Hx of PFO s/p closure (2010, [place])  Hx of tobacco use  Patient with history of CVA in setting of PFO presenting with acute onset dizziness and speech changes found to have high-grade basilar artery and left PCA P1 segment stenosis without acute hemorrhage or ischemic infarct on CT.  Symptoms quickly resolved; stroke neurology involved in the ED and patient was loaded with aspirin and Plavix.  Remains hemodynamically stable with no ongoing deficits on exam. In setting of prior CVA and suspected TIA, patient will need aggressive risk factor modification.   *12/25 MRI head: normal for age  *12/25 MRA Head: absent flow at basilar artery, likely severe stenosis less likely occlusion. Additional multifocal atherosclerosis including moderate stenosis of R MCA.  *12/26 recurrent symptoms ~1h after sitting upright, resolved while laying flat  *12/26 Echo: EF 60-65%, RV normal. IVC normal. Same as 2010 study  *12/27 CTA: some improvement in flow in basilar artery  -transitioned from heparin gtt to eliquis BID on 12/27 evening  -Allow moderate hypertension and follow up with PCP with daily BP checks upon return to [place]  - atorvastatin 80mg daily  -therapies recommend home with assist  -Outpatient sleep medicine referral on discharge  -Stroke Neurology following, recommends repeat CT imaging in 6-8 weeks with neurology follow-up at that time.  - follow up with PCP upon  return to [place] with CBC/CMP in 2-4 weeks.  **Discharge cancelled 12/28 as he had change in voice with more slurring when he was getting dressed to leave and also had a crying episode   -neurology re-evaluated   - MRI/MRAs repeated, read pending   - ASA switched to brilinta 90mg BID, will discuss with discharge pharmacy in AM pricing for this medication     CKD Stage 3a  Prior creatinine levels around 1.3 (in 2010), creatinine 1.44 on admission. Cr remains stable 1.3-1.5 this admit.     HTN, suspected - No PTA medications. Allowing moderate HTN as above.  HLD - Total Cholesterol 222, , HDL 38, trigs 265.  ANGEL - No PTA CPAP use. Sleep Medicine referral on discharge.  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 Reportedly comatose for unknown time with full recovery.     Elevated TSH  T4 free WNL  - check TSH in 4-5 weeks with PCP          Diet: Regular Diet Adult  Diet    DVT Prophylaxis: DOAC  Whitehead Catheter: Not present  Lines: None     Cardiac Monitoring: ACTIVE order. Indication: Stroke, acute (48 hours)  Code Status: Full Code      Clinically Significant Risk Factors          # Hyperchloremia: Highest Cl = 108 mmol/L in last 2 days, will monitor as appropriate                         # Overweight: Estimated body mass index is 27.12 kg/m  as calculated from the following:    Height as of this encounter: 1.829 m (6').    Weight as of this encounter: 90.7 kg (200 lb)., PRESENT ON ADMISSION     # Financial/Environmental Concerns: none         Social Drivers of Health            Disposition Plan     Medically Ready for Discharge: Anticipated Tomorrow  Pending neuro recommendations           Katie Bay DO  Hospitalist Service  [facility]  Securely message with [employer] (more info)  Text page via [employer] Paging/Directory   ______________________________________________________________________    Interval History   Patient seen and examined.  Initially stable this AM, did discuss how his speech was not back to normal particularly upon awakening. Later when he was getting dressed and ready to go he was speaking to his son and his speech became much worse. Neuro re-evaluated. Additional imaging performed and medications were adjusted. Monitor overnight with med change. Seen again in afternoon and his speech is a little slurred, but he also sounded to have some nasal congestion (due to tearful episode)    Physical Exam   Vital Signs: Temp: 98.3  F (36.8  C) Temp src: Oral BP: 139/68 Pulse: 58   Resp: 16 SpO2: 96 % O2 Device: None (Room air)    Weight: 200 lbs 0 oz    Constitutional: Awake, alert, cooperative, tearful  Respiratory: Clear to auscultation bilaterally, no crackles or wheezing  Cardiovascular: Regular rate and rhythm, normal S1 and S2, and no murmur noted  GI: Normal bowel sounds, soft, non-distended, non-tender  Skin/Integumen: No rashes, no cyanosis, no edema  Other:   speech little slurred with nasal congestion altering sound ~1pm    Medical Decision Making       50 MINUTES SPENT BY ME on the date of service doing chart review, history, exam, documentation & further activities per the note.      Data     I have personally reviewed the following data over the past 24 hrs:    7.4  \   17.4   / 179     N/A N/A N/A /  N/A   N/A N/A N/A \       Imaging results reviewed over the past 24 hrs:   No results found for this or any previous visit (from the past 24 hours).

## 2024-12-28 NOTE — PLAN OF CARE
Goal Outcome Evaluation:    Pt A&O x4. SBA, voiding in the urinal. Tolerating a regular diet. On room air, 2 PIV SL. Takes pills whole w/ water. Tele- SR. Denies any CP or SOB. Plan of care ongoing.

## 2024-12-28 NOTE — PROGRESS NOTES
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   Tele: Sinus Saeed.  GI/: BS clear, + flatus,  Continent.  Pulmonary: LS clear.  Pain: denies.   Drains/Lines: PIV SL  Skin: intact  Activity: SBA  Diet: regular with thin liquids. Takes pills whole.   Therapies recs: home  Discharge: today   Aggression Stoplight Tool: green

## 2024-12-28 NOTE — PROGRESS NOTES
"      [facility]    Vascular Neurology Progress Note    Interval History     - Started Eliquis last evening, stable overnight.   - This morning, reported that speech seemed \"harder to articulate\" shortly after waking. He notes that he doesn't think his speech has returned to normal since coming to the hospital. Later, he showered and was getting dressed when he had noticeable increase in dysarthria. This improved when he was flat in bed. He then developed uncontrollable sobbing/emotional lability that he reports is abnormal for him.     Hospital Course     Chief complaint: Dizziness (Sudden onset dizziness/sweating/family reported some slurred speech that was brief and gone by the time medics arrived. Was just sitting when episode happened.  No dizziness w change of position.  Off balance. +5 strength. 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 Presented 12/25 w/ acute onset lightheadedness, diaphoresis, dysarthria without clear focal weakness or facial droop.    Assessment and Plan     Suspected recurrent TIAs with symptoms of dysarthria, lightheadedness in the setting of severe basilar stenosis/near occlusion.  Repeat MRI brain with concern for small posterior circulation infarct.  Multifocal ICAD involving the basilar artery, left MCA, bilateral PCAs    - Neurochecks and Vital Signs every 4 hours  - Permissive HTN; goal SBP < 220 mmHg.  Recommend allowing for moderate permissive hypertension until he follows up with PCP.  If he remains stable, can slowly lower BP in the coming weeks/months.  - Continue Eliquis 5 mg twice daily.  - " "Discontinue aspirin.  Load with Brilinta 180 mg once today, then continue 90 mg twice daily thereafter  - Statin: Atorvastatin 80 mg, LDL goal 40-70  - PT/OT/SLP  - Stroke Education  - Refer for ANGEL eval at discharge  - Euthermia, Euglycemia    DVT Prophylaxis: Eliquis    Patient Follow-up    -He will need to establish and follow-up with PCP upon return to [place] and be referred for follow-up with stroke neurology locally.  Would recommend repeat imaging in 6 to 8 weeks to help determine duration of Eliquis plus Brilinta.    We will continue to follow.       Katrin Malcolm, CNP  Vascular Neurology    To page me or covering stroke neurology team member, click here: [facility]  Choose \"On Call\" tab at top, then select \"NEUROLOGY/ALL SITES\" from middle drop-down box, press Enter, then look for \"stroke\" or \"telestroke\" for your site.    Physical Examination     Temp: 98.3  F (36.8  C) Temp src: Oral BP: 139/68 Pulse: 58   Resp: 16 SpO2: 96 % O2 Device: None (Room air)      Neurologic  Mental Status:  alert, oriented x 3, follows commands, speech clear and fluent, naming and repetition normal  Cranial Nerves:  visual fields intact, EOMI with normal smooth pursuit, facial sensation intact and symmetric, facial movements symmetric, hearing not formally tested but intact to conversation, no dysarthria, shoulder shrug strong bilaterally, tongue protrusion midline, mild dysarthria on exam this morning.  Motor:  normal muscle tone and bulk, no abnormal movements, able to move all limbs spontaneously, no pronator drift  Reflexes:   Deferred  Sensory:  light touch sensation intact and symmetric throughout upper and lower extremities, no extinction on double simultaneous stimulation   Coordination:  normal finger-to-nose and heel-to-shin bilaterally without dysmetria  Station/Gait:  deferred    Stroke Scales       NIHSS  National Institutes of Health Stroke Scale     Score    Level of consciousness: (0)   Alert, keenly responsive    " LOC questions: (0)   Answers both questions correctly    LOC commands: (0)   Performs both tasks correctly    Best gaze: (0)   Normal    Visual: (0)   No visual loss    Facial palsy: (0)   Normal symmetrical movements    Motor arm (left): (0)   No drift    Motor arm (right): (0)   No drift    Motor leg (left): (0)   No drift    Motor leg (right): (0)   No drift    Limb ataxia: (0)   Absent    Sensory: (0)   Normal- no sensory loss    Best language: (0)   Normal- no aphasia    Dysarthria: (1)   Mild to moderate dysarthria    Extinction and inattention: (0)   No abnormality        Total Score:  1       Imaging/Labs   (Bolded imaging and labs new and/or personally reviewed or re-reviewed by me today)    MRI and/or Head CT MRI: No acute stroke   Intracranial Vasculature CTA 12/27: Formally read as stable from prior, however feel there is some improvement in basilar stenosis on stroke team review.    CTA: Multifocal high-grade stenosis/near occlusion of the proximal/mid basilar, bilateral PCA stenosis, moderate left M1 stenosis, moderate supraclinoid right ICA stenosis     MRA: Absent flow related contrast at the basilar artery, multifocal iCAD   Cervical Vasculature CTA: 40 to 50% right cervical ICA stenosis      Echocardiogram EF 60 to 65%, no regional wall motion abnormalities, normal left atrial size   EKG/Telemetry SR   Other Testing Not Applicable      LDL  12/25/2024: 131 mg/dL   A1C  12/25/2024: 5.1 %   Troponin No lab value available in past 48 hrs     Other labs reviewed by me today:  CBC, BMP    Time Spent on this Encounter   Billing: I have personally spent a total of 20 minutes providing care today, time spent in reviewing medical records and devising the plan as recorded above.

## 2024-12-28 NOTE — DISCHARGE SUMMARY
[facility]  Hospitalist Discharge Summary      Date of Admission:  12/25/2024  Date of Discharge:  12/28/2024  Discharging Provider: Katie Bay DO  Discharge Service: Hospitalist Service    Discharge Diagnoses   Concern for posterior circulation TIA vs CVA  High-grade stenosis of proximal-mid basilar artery  High grade stenosis of L PCA P1 segment  ICAD involving basilar artery, L PCA, R PCA, L MCA, supraclinoid R ICA   Hx of L posterior corona radiata CVA (2010)  Hx of PFO s/p closure (2010, [place])  Hx of tobacco use  CKD Stage 3a  HTN, suspected  HLD  ANGEL   Adult onset Reye's syndrome (1981)  Elevated TSH    Clinically Significant Risk Factors     # Overweight: Estimated body mass index is 27.12 kg/m  as calculated from the following:    Height as of this encounter: 1.829 m (6').    Weight as of this encounter: 90.7 kg (200 lb).       Follow-ups Needed After Discharge   Follow-up Appointments       Hospital to Primary Care - Establish PCP Referral      Please be aware that coverage of these services is subject to the terms and limitations of your health insurance plan.  Call member services at your health plan with any benefit or coverage questions.    Schedule Primary Care visit within: 30 Days   Recommended labs and Imaging (to be ordered by Primary Care Provider): CBC, CMP, TSH in 4 weeks       Follow Up      Follow up with neurology, within 6-8 weeks. to evaluate medication change and for hospital follow- up. You will need repeat CT imaging at that time.    Follow up with your PCP to get referral for sleep study.                Discharge Disposition   Discharged to home  Condition at discharge: Stable    Hospital Course   Elio Aguilar is a 79 year old male with history of HTN, HLD, ANGEL, Reye's Syndrome who was admitted on 12/25/2024 with concern for posterior circulation TIA vs CVA.     Concern for posterior circulation TIA vs CVA  High-grade stenosis of proximal-mid basilar  artery  High grade stenosis of L PCA P1 segment  ICAD involving basilar artery, L PCA, R PCA, L MCA, supraclinoid R ICA   Hx of L posterior corona radiata CVA (2010)  Hx of PFO s/p closure (2010, [place])  Hx of tobacco use  Patient with history of CVA in setting of PFO presenting with acute onset dizziness and speech changes found to have high-grade basilar artery and left PCA P1 segment stenosis without acute hemorrhage or ischemic infarct on CT.  Symptoms quickly resolved; stroke neurology involved in the ED and patient was loaded with aspirin and Plavix.  Remains hemodynamically stable with no ongoing deficits on exam. In setting of prior CVA and suspected TIA, patient will need aggressive risk factor modification.   *12/25 MRI head: normal for age  *12/25 MRA Head: absent flow at basilar artery, likely severe stenosis less likely occlusion. Additional multifocal atherosclerosis including moderate stenosis of R MCA.  *12/26 recurrent symptoms ~1h after sitting upright, resolved while laying flat  *12/26 Echo: EF 60-65%, RV normal. IVC normal. Same as 2010 study  *12/27 CTA: some improvement in flow in basilar artery  -transitioned from heparin gtt to eliquis BID on 12/27 evening  - continue aspirin 81mg daily  -Allow moderate hypertension and follow up with PCP with daily BP checks upon return to [place]  - atorvastatin 80mg daily  -therapies recommend home with assist  -Outpatient sleep medicine referral on discharge  -Stroke Neurology following, recommends repeat CT imaging in 6-8 weeks with neurology follow-up at that time.  - follow up with PCP upon return to [place] with CBC/CMP in 2-4 weeks.     CKD Stage 3a  Prior creatinine levels around 1.3 (in 2010), creatinine 1.44 on admission. Cr remains stable 1.3-1.5 this admit.     HTN, suspected - No PTA medications. Allowing moderate HTN as above.  HLD - Total Cholesterol 222, , HDL 38, trigs 265.  ANGEL - No PTA CPAP use. Sleep Medicine referral on discharge.  Adult  onset Reye's syndrome (1981) - Following viral illness and pepto-bismol, ASA use. Reportedly comatose for unknown time with full recovery.     Elevated TSH  T4 free WNL  - check TSH in 4-5 weeks with PCP    Consultations This Hospital Stay   PHARMACY IP CONSULT  SPEECH LANGUAGE PATH ADULT IP CONSULT  PHARMACY IP CONSULT  PHARMACY IP CONSULT  PHARMACY IP CONSULT  PHYSICAL THERAPY ADULT IP CONSULT  OCCUPATIONAL THERAPY ADULT IP CONSULT  REHAB ADMISSIONS LIAISON IP CONSULT  CARE MANAGEMENT / SOCIAL WORK IP CONSULT  NEUROLOGY IP STROKE CONSULT  PHARMACY LIAISON FOR MEDICATION COVERAGE CONSULT    Code Status   Full Code    Time Spent on this Encounter   IKatie DO, personally saw the patient today and spent greater than 30 minutes discharging this patient.       Katie Bay DO  [facility] NEUROSCIENCE UNIT  [address] [place] [address]  Phone: [phone]  ______________________________________________________________________    Physical Exam   Vital Signs: Temp: 98.3  F (36.8  C) Temp src: Oral BP: (!) 168/87 Pulse: 58   Resp: 16 SpO2: 96 % O2 Device: None (Room air)    Weight: 200 lbs 0 oz    Patient seen and examined. He is doing well. Reports that he has to be precise with his words in the morning when he wakes up, but that it gets easier throughout the day. Words are just a little more difficult to get out in the morning. He hopes this will get better over time. Stable for discharge to home with family and eventual flight back to [place].    Constitutional: Awake, alert, cooperative, no apparent distress  Respiratory: Clear to auscultation bilaterally, no crackles or wheezing  Cardiovascular: Regular rate and rhythm, normal S1 and S2, and no murmur noted  GI: Normal bowel sounds, soft, non-distended, non-tender  Skin/Integumen: No rashes, no cyanosis, no edema  Other:   speech clear.       Primary Care Physician   Physician No Ref-Primary    Discharge Orders      Sleep Study  Referral      Hospital to Primary Care - Establish PCP Referral      Reason for your hospital stay    Basilar artery stenosis causing stroke symptoms     Activity    Your activity upon discharge: activity as tolerated     Discharge Instructions    1. Take eliquis 5mg twice daily (about 12 hours apart) and preferably with food.    2. Take atorvastatin 80mg once daily with your evening eliquis.    3. Take aspirin 81mg once daily with your morning eliquis.     Monitor and record    Blood pressure: daily. Keep a log of your blood pressures and bring to your primary care doctor's office     Follow Up    Follow up with neurology, within 6-8 weeks. to evaluate medication change and for hospital follow- up. You will need repeat CT imaging at that time.    Follow up with your PCP to get referral for sleep study.     Diet    Follow this diet upon discharge: Regular Diet Adult       Significant Results and Procedures   Most Recent 3 CBC's:  Recent Labs   Lab Test 12/28/24  0802 12/27/24  0817 12/26/24  0527   WBC 7.4 6.6 6.3   HGB 17.4 15.8 16.0   MCV 92 90 90    146* 161     Most Recent 3 BMP's:  Recent Labs   Lab Test 12/27/24  0817 12/26/24  1706 12/26/24  1238 12/26/24  0527 12/26/24  0024 12/25/24  1240     --   --  139  --  141   POTASSIUM 4.1  --   --  4.2  --  3.7   CHLORIDE 108*  --   --  107  --  102   CO2 20*  --   --  22  --  26   BUN 14.8  --   --  14.9  --  14.9   CR 1.36*  --   --  1.52*  --  1.44*   ANIONGAP 12  --   --  10  --  13   SPENSER 8.8  --   --  8.8  --  9.2   GLC 95 125* 103* 99   < > 85    < > = values in this interval not displayed.   ,   Results for orders placed or performed during the hospital encounter of 12/25/24   CT Head w/o Contrast    Narrative    EXAM: CT HEAD W/O CONTRAST  LOCATION: [facility]  DATE: 12/25/2024    INDICATION: Code Stroke to evaluate for potential thrombolysis and thrombectomy. PLEASE READ IMMEDIATELY. Dizziness, slow  speech.  COMPARISON: None.  TECHNIQUE: Routine CT Head without IV contrast. Multiplanar reformats. Dose reduction techniques were used.    FINDINGS:  INTRACRANIAL CONTENTS: No intracranial hemorrhage. Mild diffuse cerebral parenchymal volume loss. No midline shift. No CT evidence for an acute infarct. Mild periventricular and scattered foci of deep white matter hypodensities involving both cerebral   hemispheres.    VISUALIZED ORBITS/SINUSES/MASTOIDS: Prior bilateral cataract surgery. Visualized portions of the orbits are otherwise unremarkable. Mild mucosal thickening of the ethmoid air cells. No middle ear or mastoid effusion.    BONES/SOFT TISSUES: No acute abnormality.      Impression    IMPRESSION:  1.  No intracranial hemorrhage, mass lesions, hydrocephalus or CT evidence for an acute infarct. MRI is more sensitive for the evaluation of acute infarct.  2.  Mild diffuse cerebral parenchymal volume loss. Presumed chronic hypertensive/microvascular ischemic white matter changes.      Discussed the head CT findings with Dr. Boogie at 12:56 PM, 12/25/2024.   CTA Head Neck with Contrast    Narrative    EXAM: CTA HEAD NECK W CONTRAST  LOCATION: [facility]  DATE: 12/25/2024    INDICATION: Code Stroke to evaluate for potential thrombolysis and thrombectomy. PLEASE READ IMMEDIATELY.  COMPARISON: None.  CONTRAST: 67mL Isovue 370  TECHNIQUE: Head and neck CT angiogram with IV contrast. Axial helical CT images of the head and neck vessels obtained during the arterial phase of intravenous contrast administration. Axial 2D reconstructed images and multiplanar 3D MIP reconstructed   images of the head and neck vessels were performed by the technologist. Dose reduction techniques were used. All stenosis measurements made according to NASCET criteria unless otherwise specified.    FINDINGS:   HEAD CTA:  ANTERIOR CIRCULATION: Moderate atherosclerotic narrowing of the supraclinoid right internal carotid  artery. Mild atherosclerotic atherosclerotic narrowing of the intracranial left internal carotid artery. The anterior cerebral arteries are patent without   hemodynamically significant stenosis. Short segment moderate narrowing of the M1 segment of the left middle cerebral artery. The left middle cerebral artery has normal caliber distal to this area. The right middle cerebral artery is patent without   hemodynamically significant stenosis. Standard [place] anatomy.    POSTERIOR CIRCULATION: Multifocal high-grade narrowings of the proximal and mid basilar artery. The distal basilar artery is patent and has normal caliber. There is a prominent left posterior communicating artery. High-grade narrowing of the P1 segment   of the left posterior cerebral artery. Mild to moderate narrowing of the distal P2 segment of the right posterior cerebral artery. Dominant right and smaller left vertebral artery contribute to a normal basilar artery.     DURAL VENOUS SINUSES: The major dural venous sinuses are not well opacified on this exam.    NECK CTA:  RIGHT CAROTID: Moderate atherosclerotic narrowing of the right carotid bulb and proximal right internal carotid artery. Approximately 40-50% narrowing of the proximal right internal carotid artery based on the NASCET criteria.    LEFT CAROTID: Mild atherosclerotic calcification of the left carotid bulb and proximal left internal carotid artery. No hemodynamically significant narrowing of the left internal carotid artery based on the NASCET criteria.    VERTEBRAL ARTERIES: No focal stenosis or dissection. Dominant right and smaller left vertebral arteries.    AORTIC ARCH: Common origin of the brachiocephalic artery and the left common carotid artery. The origins of the arch vessels are patent without hemodynamically significant stenosis.    NONVASCULAR STRUCTURES: The lung apices are clear. Moderate degenerative changes of the cervical spine. Large partially calcified left  paracentral disc extrusion at C5-C6 causing moderate spinal canal narrowing and high-grade narrowing of the left   lateral recess.      Impression    IMPRESSION:   HEAD CTA:   1.  Multifocal high-grade narrowings of the proximal and mid basilar artery. The distal basilar artery is patent and has normal caliber. There is a prominent left posterior communicating artery.  2.  High-grade narrowing of the P1 segment of the left posterior cerebral artery. Mild to moderate narrowing of the distal P2 segment of the right posterior cerebral artery.  3.  Short segment moderate narrowing of the M1 segment of the left middle cerebral artery. The left middle cerebral artery has normal caliber distal to this area.   4.  Moderate atherosclerotic narrowing of the supraclinoid right internal carotid artery.     NECK CTA:  1.  Approximately 40-50% narrowing of the proximal right internal carotid artery based on the NASCET criteria.  2.  No hemodynamically significant narrowing of the left internal carotid artery based on the NASCET criteria.  3.  The vertebral arteries are patent throughout their course in the neck.    Discussed the CTA findings with Dr. Boogie at 12:56 PM and 1:04 PM, 12/25/2024.   MR Brain w/o & w Contrast    Narrative    EXAM: MR BRAIN W/O and W CONTRAST, MRA BRAIN ([place]) W CONTRAST  LOCATION: [facility]  DATE: 12/25/2024    INDICATION: TIA  COMPARISON: CTA head neck 12/25/2024  CONTRAST: 9 mL Gadavist  TECHNIQUE:   1) Routine multiplanar multisequence head MRI without and with intravenous contrast.  2) 3D time-of-flight head MRA without intravenous contrast.    FINDINGS:  HEAD MRI:  INTRACRANIAL CONTENTS: No acute or subacute infarct. No mass, acute hemorrhage, or extra-axial fluid collections. A couple foci of nonspecific T2/FLAIR hyperintensity within the white matter are of doubtful clinical significance and are within the range   of expected for a patient of this age.  Signal intensity of the brain parenchyma is otherwise normal. Normal ventricles and sulci. Normal position of the cerebellar tonsils. No pathologic enhancement.    SELLA: No abnormality accounting for technique.    OSSEOUS STRUCTURES/SOFT TISSUES: Normal marrow signal. The major intracranial vascular flow voids are maintained.     ORBITS: Prior bilateral cataract surgery. Visualized portions of the orbits are otherwise unremarkable.     SINUSES/MASTOIDS: No paranasal sinus mucosal disease. No middle ear or mastoid effusion.     HEAD MRA:   ANTERIOR CIRCULATION: There is multifocal atherosclerosis throughout. This includes moderate stenosis of the left P1 segment posterior cerebral artery. No stenosis, aneurysm, or high flow vascular malformation. Fetal origin of the left posterior cerebral   artery from the anterior circulation.    POSTERIOR CIRCULATION: There are multifocal moderate to severe stenoses of the basilar artery. Portions of the basilar artery are absent of flow related contrast. This is favored to be secondary to high-grade stenosis rather than occlusion given the CTA   findings and relative preserved brain parenchyma. There are additional multifocal mild to moderate stenoses of the bilateral posterior cerebral arteries. No aneurysm, or high flow vascular malformation.        Impression    IMPRESSION:  HEAD MRI:   1.  Normal brain for age.    HEAD MRA:   1.  Absent flow related contrast at the basilar artery. Given CT findings and MRI brain findings this is favored represent severe stenosis, less likely occlusion. Continued clinical follow-up is recommended.  2.  Additional multifocal atherosclerosis including moderate stenosis of the right middle cerebral artery.   MRA Brain ([place]) wo Contrast    Narrative    EXAM: MR BRAIN W/O and W CONTRAST, MRA BRAIN ([place]) W CONTRAST  LOCATION: [facility]  DATE: 12/25/2024    INDICATION: TIA  COMPARISON: CTA head  neck 12/25/2024  CONTRAST: 9 mL Gadavist  TECHNIQUE:   1) Routine multiplanar multisequence head MRI without and with intravenous contrast.  2) 3D time-of-flight head MRA without intravenous contrast.    FINDINGS:  HEAD MRI:  INTRACRANIAL CONTENTS: No acute or subacute infarct. No mass, acute hemorrhage, or extra-axial fluid collections. A couple foci of nonspecific T2/FLAIR hyperintensity within the white matter are of doubtful clinical significance and are within the range   of expected for a patient of this age. Signal intensity of the brain parenchyma is otherwise normal. Normal ventricles and sulci. Normal position of the cerebellar tonsils. No pathologic enhancement.    SELLA: No abnormality accounting for technique.    OSSEOUS STRUCTURES/SOFT TISSUES: Normal marrow signal. The major intracranial vascular flow voids are maintained.     ORBITS: Prior bilateral cataract surgery. Visualized portions of the orbits are otherwise unremarkable.     SINUSES/MASTOIDS: No paranasal sinus mucosal disease. No middle ear or mastoid effusion.     HEAD MRA:   ANTERIOR CIRCULATION: There is multifocal atherosclerosis throughout. This includes moderate stenosis of the left P1 segment posterior cerebral artery. No stenosis, aneurysm, or high flow vascular malformation. Fetal origin of the left posterior cerebral   artery from the anterior circulation.    POSTERIOR CIRCULATION: There are multifocal moderate to severe stenoses of the basilar artery. Portions of the basilar artery are absent of flow related contrast. This is favored to be secondary to high-grade stenosis rather than occlusion given the CTA   findings and relative preserved brain parenchyma. There are additional multifocal mild to moderate stenoses of the bilateral posterior cerebral arteries. No aneurysm, or high flow vascular malformation.        Impression    IMPRESSION:  HEAD MRI:   1.  Normal brain for age.    HEAD MRA:   1.  Absent flow related contrast at  the basilar artery. Given CT findings and MRI brain findings this is favored represent severe stenosis, less likely occlusion. Continued clinical follow-up is recommended.  2.  Additional multifocal atherosclerosis including moderate stenosis of the right middle cerebral artery.   CTA Head Neck w Contrast    Narrative    CTA HEAD NECK WITH CONTRAST;  CT HEAD WITHOUT CONTRAST  12/27/2024 9:05 AM     HISTORY:  Follow up basilar severe stenosis/near occlusion.       COMPARISON: MRI and CTs from 12/25/2024    TECHNIQUE:  HEAD CT: Using multidetector thin collimation helical acquisition  technique, axial, coronal and sagittal CT images from the skull base  to the vertex were obtained without intravenous contrast.   (topogram) image(s) also obtained and reviewed.  HEAD and NECK CTA: During rapid bolus intravenous injection of  nonionic contrast material, axial images were obtained using thin  collimation multidetector helical technique from the base of the upper  aortic arch through the [place]. This CT angiogram data was  reconstructed at thin intervals with mild overlap. Images were sent to  the 3D workstation, and 3D reconstructions were obtained. The axial  source images, multiplanar reformations, 3D reconstructions in both  maximum intensity projection display and volume rendered models were  reviewed, with reconstructions performed by the technologist and the  radiologist.    CONTRAST: 67 cc Isovue-[id number]    FINDINGS:    Head CT demonstrates no intracranial hemorrhage, mass effect, or  midline shift. Gray/white matter differentiation in both cerebral  hemispheres is preserved. Ventricles are proportionate to the cerebral  sulci. The basal cisterns are clear. Mild generalized cerebral  atrophy.    The bony calvaria and the bones of the skull base are normal. The  visualized portions of the paranasal sinuses and mastoid air cells are  clear    Head CTA demonstrates small basilar artery with multifocal areas  of  noncontrast filling. Short segment moderate to severe stenosis at the  origin of the left MCA. Moderate focal stenosis in the proximal M1  segment, as well. Short segment mild to moderate stenosis in the P1  segment of the left PCA. Unchanged mild to moderate focal stenosis in  the supraclinoid right ICA.    Neck CTA demonstrates patent major cervical arteries. Unchanged 40-45%   stenosis in the proximal right ICA, secondary to mixed calcified and  soft plaque. There is mixed calcified and soft plaque at the left ICA  bifurcation without significant stenosis. Hypoplastic left vertebral  artery terminates as PICA.    No acute finding in the visualized neck soft tissues, or in the  superior mediastinum/thorax.      Impression    IMPRESSION:    1. Head CT: No acute intracranial pathology.  2. Head CTA: Stable findings compared to 12/25/2024.  a. Small basilar artery with multifocal areas of absent contrast  opacification throughout its course, representing high-grade stenosis.    b. Short segment moderate to severe stenosis at the origin of the left  MCA and moderate focal stenosis in the proximal M1 segment.  c. Mild to moderate focal stenosis in the supraclinoid right ICA.  3. Neck CTA:  a. Patent major cervical arteries.   b. Unchanged 40-45% stenosis in the proximal right ICA, secondary to  mixed calcified and soft plaque.     JAMMIE FARRAR MD         SYSTEM ID:  [id number]   CT Head w/o Contrast    Narrative    CTA HEAD NECK WITH CONTRAST;  CT HEAD WITHOUT CONTRAST  12/27/2024 9:05 AM     HISTORY:  Follow up basilar severe stenosis/near occlusion.       COMPARISON: MRI and CTs from 12/25/2024    TECHNIQUE:  HEAD CT: Using multidetector thin collimation helical acquisition  technique, axial, coronal and sagittal CT images from the skull base  to the vertex were obtained without intravenous contrast.   (topogram) image(s) also obtained and reviewed.  HEAD and NECK CTA: During rapid bolus intravenous  injection of  nonionic contrast material, axial images were obtained using thin  collimation multidetector helical technique from the base of the upper  aortic arch through the [place]. This CT angiogram data was  reconstructed at thin intervals with mild overlap. Images were sent to  the 3D workstation, and 3D reconstructions were obtained. The axial  source images, multiplanar reformations, 3D reconstructions in both  maximum intensity projection display and volume rendered models were  reviewed, with reconstructions performed by the technologist and the  radiologist.    CONTRAST: 67 cc Isovue-[id number]    FINDINGS:    Head CT demonstrates no intracranial hemorrhage, mass effect, or  midline shift. Gray/white matter differentiation in both cerebral  hemispheres is preserved. Ventricles are proportionate to the cerebral  sulci. The basal cisterns are clear. Mild generalized cerebral  atrophy.    The bony calvaria and the bones of the skull base are normal. The  visualized portions of the paranasal sinuses and mastoid air cells are  clear    Head CTA demonstrates small basilar artery with multifocal areas of  noncontrast filling. Short segment moderate to severe stenosis at the  origin of the left MCA. Moderate focal stenosis in the proximal M1  segment, as well. Short segment mild to moderate stenosis in the P1  segment of the left PCA. Unchanged mild to moderate focal stenosis in  the supraclinoid right ICA.    Neck CTA demonstrates patent major cervical arteries. Unchanged 40-45%   stenosis in the proximal right ICA, secondary to mixed calcified and  soft plaque. There is mixed calcified and soft plaque at the left ICA  bifurcation without significant stenosis. Hypoplastic left vertebral  artery terminates as PICA.    No acute finding in the visualized neck soft tissues, or in the  superior mediastinum/thorax.      Impression    IMPRESSION:    1. Head CT: No acute intracranial pathology.  2. Head CTA: Stable  findings compared to 2024.  a. Small basilar artery with multifocal areas of absent contrast  opacification throughout its course, representing high-grade stenosis.    b. Short segment moderate to severe stenosis at the origin of the left  MCA and moderate focal stenosis in the proximal M1 segment.  c. Mild to moderate focal stenosis in the supraclinoid right ICA.  3. Neck CTA:  a. Patent major cervical arteries.   b. Unchanged 40-45% stenosis in the proximal right ICA, secondary to  mixed calcified and soft plaque.     JAMMIE FARRRA MD         SYSTEM ID:  [id number]   Echocardiogram Complete     Value    LVEF  60-65%    Narrative    [id number]  [id number]  [id number]  [id number]^RICHA^VIANEY^ANATOLY     [facility]  Echocardiography Laboratory  [address] [place] [address]     Name: ANNY LAWLER  MRN: [id number]  : 1945  Study Date: 2024 01:31 PM  Age: 79 yrs  Gender: Male  Patient Location: [facility]  Reason For Study: TIA  Ordering Physician: VIANEY CHAUDHRY  Performed By: Shanthi Briceño     BSA: 2.1 m2  Height: 72 in  Weight: 200 lb  HR: 65  BP: 137/79 mmHg  ______________________________________________________________________________  Procedure  Echocardiogram with two-dimensional, color and spectral Doppler. Definity (NDC  #[id number]) given intravenously.  ______________________________________________________________________________  Interpretation Summary     Left ventricular systolic function is normal.  The visual ejection fraction is 60-65%.  The right ventricle is normal in structure, function and size.  No significant valve dysfunction.  The aortic root is normal size.  The inferior vena cava was normal in size with preserved respiratory  variability.  There is no pericardial effusion.     No major change from study dated 2010.  ______________________________________________________________________________  Left Ventricle  The left ventricle is normal in structure,  function and size. There is mild  concentric left ventricular hypertrophy. Left ventricular systolic function is  normal. The visual ejection fraction is 60-65%. Grade I or early diastolic  dysfunction. Normal left ventricular wall motion.     Right Ventricle  The right ventricle is normal in structure, function and size.     Atria  Normal left atrial size. Right atrial size is normal.     Mitral Valve  The mitral valve is normal in structure and function.     Tricuspid Valve  The tricuspid valve is normal in structure and function. Right ventricular  systolic pressure could not be approximated due to inadequate tricuspid  regurgitation.     Aortic Valve  The aortic valve is normal in structure and function.     Pulmonic Valve  The pulmonic valve is normal in structure and function.     Vessels  The aortic root is normal size. Normal size ascending aorta. The inferior vena  cava was normal in size with preserved respiratory variability.     Pericardium  There is no pericardial effusion.     ______________________________________________________________________________  MMode/2D Measurements & Calculations  IVSd: 1.2 cm  LVIDd: 4.5 cm  LVIDs: 2.6 cm  LVPWd: 1.2 cm  FS: 42.8 %     LV mass(C)d: 195.6 grams  LV mass(C)dI: 91.8 grams/m2  Ao root diam: 3.6 cm  asc Aorta Diam: 3.3 cm  LVOT diam: 2.1 cm  LVOT area: 3.5 cm2  Ao root diam index Ht(cm/m): 2.0  Ao root diam index BSA (cm/m2): 1.7  Asc Ao diam index BSA (cm/m2): 1.5  Asc Ao diam index Ht(cm/m): 1.8  RWT: 0.53  TAPSE: 2.0 cm     Doppler Measurements & Calculations  MV E max db: 56.6 cm/sec  MV A max db: 66.8 cm/sec  MV E/A: 0.85  MV dec slope: 119.0 cm/sec2  MV dec time: 0.48 sec  Ao V2 max: 119.5 cm/sec  Ao max P.0 mmHg  Ao V2 mean: 91.7 cm/sec  Ao mean PG: 3.7 mmHg  Ao V2 VTI: 28.1 cm  CLINT(I,D): 3.3 cm2  CLINT(V,D): 3.4 cm2  LV V1 max P.4 mmHg  LV V1 max: 116.3 cm/sec  LV V1 VTI: 26.5 cm     SV(LVOT): 92.5 ml  SI(LVOT): 43.4 ml/m2  PA acc time: 0.13  sec  AV Kong Ratio (DI): 0.97  CLINT Index (cm2/m2): 1.5  E/E' av.4  Lateral E/e': 9.0  Medial E/e': 9.8  RV S Kong: 12.2 cm/sec     ______________________________________________________________________________  Report approved by: Jessica Rosa MD on 2024 02:40 PM             Discharge Medications   Current Discharge Medication List        START taking these medications    Details   apixaban ANTICOAGULANT (ELIQUIS) 5 MG tablet Take 1 tablet (5 mg) by mouth 2 times daily.  Qty: 60 tablet, Refills: 1    Associated Diagnoses: Basilar artery stenosis      aspirin 81 MG EC tablet Take 1 tablet (81 mg) by mouth daily.  Qty: 30 tablet, Refills: 1    Associated Diagnoses: Basilar artery stenosis      atorvastatin (LIPITOR) 80 MG tablet Take 1 tablet (80 mg) by mouth every evening.  Qty: 30 tablet, Refills: 1    Associated Diagnoses: Basilar artery stenosis           Allergies   No Known Allergies

## 2024-12-29 ENCOUNTER — ANESTHESIA EVENT (OUTPATIENT)
Dept: SURGERY | Facility: CLINIC | Age: 79
End: 2024-12-29

## 2024-12-29 ENCOUNTER — ANESTHESIA (OUTPATIENT)
Dept: MEDSURG UNIT | Facility: CLINIC | Age: 79
End: 2024-12-29
Payer: MEDICARE

## 2024-12-29 ENCOUNTER — ANESTHESIA (OUTPATIENT)
Dept: SURGERY | Facility: CLINIC | Age: 79
End: 2024-12-29

## 2024-12-29 ENCOUNTER — ANESTHESIA EVENT (OUTPATIENT)
Dept: MEDSURG UNIT | Facility: CLINIC | Age: 79
End: 2024-12-29
Payer: MEDICARE

## 2024-12-29 LAB
ADV 40+41 DNA STL QL NAA+NON-PROBE: NEGATIVE
ANION GAP SERPL CALCULATED.3IONS-SCNC: 13 MMOL/L (ref 7–15)
ASTRO TYP 1-8 RNA STL QL NAA+NON-PROBE: NEGATIVE
BUN SERPL-MCNC: 16.6 MG/DL (ref 8–23)
C CAYETANENSIS DNA STL QL NAA+NON-PROBE: NEGATIVE
CALCIUM SERPL-MCNC: 9.3 MG/DL (ref 8.8–10.4)
CAMPYLOBACTER DNA SPEC NAA+PROBE: NEGATIVE
CHLORIDE SERPL-SCNC: 108 MMOL/L (ref 98–107)
CREAT SERPL-MCNC: 1.41 MG/DL (ref 0.67–1.17)
CRYPTOSP DNA STL QL NAA+NON-PROBE: NEGATIVE
E COLI O157 DNA STL QL NAA+NON-PROBE: NORMAL
E HISTOLYT DNA STL QL NAA+NON-PROBE: NEGATIVE
EAEC ASTA GENE ISLT QL NAA+PROBE: NEGATIVE
EC STX1+STX2 GENES STL QL NAA+NON-PROBE: NEGATIVE
EGFRCR SERPLBLD CKD-EPI 2021: 50 ML/MIN/1.73M2
EPEC EAE GENE STL QL NAA+NON-PROBE: NEGATIVE
ERYTHROCYTE [DISTWIDTH] IN BLOOD BY AUTOMATED COUNT: 12.3 % (ref 10–15)
ERYTHROCYTE [DISTWIDTH] IN BLOOD BY AUTOMATED COUNT: 12.4 % (ref 10–15)
ETEC LTA+ST1A+ST1B TOX ST NAA+NON-PROBE: NEGATIVE
FLUAV RNA SPEC QL NAA+PROBE: NEGATIVE
FLUBV RNA RESP QL NAA+PROBE: NEGATIVE
G LAMBLIA DNA STL QL NAA+NON-PROBE: NEGATIVE
GLUCOSE BLDC GLUCOMTR-MCNC: 105 MG/DL (ref 70–99)
GLUCOSE BLDC GLUCOMTR-MCNC: 91 MG/DL (ref 70–99)
GLUCOSE SERPL-MCNC: 103 MG/DL (ref 70–99)
HCO3 SERPL-SCNC: 20 MMOL/L (ref 22–29)
HCT VFR BLD AUTO: 46.2 % (ref 40–53)
HCT VFR BLD AUTO: 47.6 % (ref 40–53)
HGB BLD-MCNC: 16.4 G/DL (ref 13.3–17.7)
HGB BLD-MCNC: 17 G/DL (ref 13.3–17.7)
MCH RBC QN AUTO: 32.3 PG (ref 26.5–33)
MCH RBC QN AUTO: 32.4 PG (ref 26.5–33)
MCHC RBC AUTO-ENTMCNC: 35.5 G/DL (ref 31.5–36.5)
MCHC RBC AUTO-ENTMCNC: 35.7 G/DL (ref 31.5–36.5)
MCV RBC AUTO: 91 FL (ref 78–100)
MCV RBC AUTO: 91 FL (ref 78–100)
NOROVIRUS GI+II RNA STL QL NAA+NON-PROBE: NEGATIVE
P SHIGELLOIDES DNA STL QL NAA+NON-PROBE: NEGATIVE
PA ADP BLD-ACNC: 7 PRU
PLATELET # BLD AUTO: 167 10E3/UL (ref 150–450)
PLATELET # BLD AUTO: 189 10E3/UL (ref 150–450)
POTASSIUM SERPL-SCNC: 4.4 MMOL/L (ref 3.4–5.3)
RBC # BLD AUTO: 5.07 10E6/UL (ref 4.4–5.9)
RBC # BLD AUTO: 5.25 10E6/UL (ref 4.4–5.9)
RSV RNA SPEC NAA+PROBE: NEGATIVE
RVA RNA STL QL NAA+NON-PROBE: NEGATIVE
SALMONELLA SP RPOD STL QL NAA+PROBE: NEGATIVE
SAPO I+II+IV+V RNA STL QL NAA+NON-PROBE: NEGATIVE
SARS-COV-2 RNA RESP QL NAA+PROBE: NEGATIVE
SHIGELLA SP+EIEC IPAH ST NAA+NON-PROBE: NEGATIVE
SODIUM SERPL-SCNC: 141 MMOL/L (ref 135–145)
V CHOLERAE DNA SPEC QL NAA+PROBE: NEGATIVE
VIBRIO DNA SPEC NAA+PROBE: NEGATIVE
WBC # BLD AUTO: 11.5 10E3/UL (ref 4–11)
WBC # BLD AUTO: 14.9 10E3/UL (ref 4–11)
Y ENTEROCOL DNA STL QL NAA+PROBE: NEGATIVE

## 2024-12-29 PROCEDURE — 85014 HEMATOCRIT: CPT | Performed by: HOSPITALIST

## 2024-12-29 PROCEDURE — 31500 INSERT EMERGENCY AIRWAY: CPT | Performed by: ANESTHESIOLOGY

## 2024-12-29 PROCEDURE — 272N000196 HC ACCESSORY CR5

## 2024-12-29 PROCEDURE — 36415 COLL VENOUS BLD VENIPUNCTURE: CPT | Performed by: NURSE PRACTITIONER

## 2024-12-29 PROCEDURE — 250N000009 HC RX 250: Performed by: INTERNAL MEDICINE

## 2024-12-29 PROCEDURE — 258N000003 HC RX IP 258 OP 636: Performed by: HOSPITALIST

## 2024-12-29 PROCEDURE — 80048 BASIC METABOLIC PNL TOTAL CA: CPT | Performed by: HOSPITALIST

## 2024-12-29 PROCEDURE — 85014 HEMATOCRIT: CPT | Performed by: NURSE PRACTITIONER

## 2024-12-29 PROCEDURE — C1887 CATHETER, GUIDING: HCPCS

## 2024-12-29 PROCEDURE — C1769 GUIDE WIRE: HCPCS

## 2024-12-29 PROCEDURE — 250N000011 HC RX IP 250 OP 636: Performed by: NURSE PRACTITIONER

## 2024-12-29 PROCEDURE — 87637 SARSCOV2&INF A&B&RSV AMP PRB: CPT | Performed by: HOSPITALIST

## 2024-12-29 PROCEDURE — 250N000011 HC RX IP 250 OP 636: Performed by: INTERNAL MEDICINE

## 2024-12-29 PROCEDURE — 87507 IADNA-DNA/RNA PROBE TQ 12-25: CPT | Performed by: STUDENT IN AN ORGANIZED HEALTH CARE EDUCATION/TRAINING PROGRAM

## 2024-12-29 PROCEDURE — B31R1ZZ FLUOROSCOPY OF INTRACRANIAL ARTERIES USING LOW OSMOLAR CONTRAST: ICD-10-PCS | Performed by: NEUROLOGICAL SURGERY

## 2024-12-29 PROCEDURE — 94002 VENT MGMT INPAT INIT DAY: CPT

## 2024-12-29 PROCEDURE — 999N000157 HC STATISTIC RCP TIME EA 10 MIN

## 2024-12-29 PROCEDURE — 99291 CRITICAL CARE FIRST HOUR: CPT | Mod: FS | Performed by: NURSE PRACTITIONER

## 2024-12-29 PROCEDURE — 250N000013 HC RX MED GY IP 250 OP 250 PS 637: Performed by: HOSPITALIST

## 2024-12-29 PROCEDURE — 85041 AUTOMATED RBC COUNT: CPT | Performed by: HOSPITALIST

## 2024-12-29 PROCEDURE — 250N000013 HC RX MED GY IP 250 OP 250 PS 637: Performed by: PSYCHIATRY & NEUROLOGY

## 2024-12-29 PROCEDURE — 250N000009 HC RX 250: Performed by: PSYCHIATRY & NEUROLOGY

## 2024-12-29 PROCEDURE — 037G3ZZ DILATION OF INTRACRANIAL ARTERY, PERCUTANEOUS APPROACH: ICD-10-PCS | Performed by: NEUROLOGICAL SURGERY

## 2024-12-29 PROCEDURE — 99292 CRITICAL CARE ADDL 30 MIN: CPT | Performed by: PSYCHIATRY & NEUROLOGY

## 2024-12-29 PROCEDURE — 250N000009 HC RX 250: Performed by: HOSPITALIST

## 2024-12-29 PROCEDURE — 99233 SBSQ HOSP IP/OBS HIGH 50: CPT | Performed by: HOSPITALIST

## 2024-12-29 PROCEDURE — 250N000011 HC RX IP 250 OP 636: Performed by: ANESTHESIOLOGY

## 2024-12-29 PROCEDURE — 370N000003 HC ANESTHESIA WARD SERVICE: Performed by: ANESTHESIOLOGY

## 2024-12-29 PROCEDURE — 272N000302 HC DEVICE INFLATION CR5

## 2024-12-29 PROCEDURE — 92526 ORAL FUNCTION THERAPY: CPT | Mod: GN

## 2024-12-29 PROCEDURE — 250N000013 HC RX MED GY IP 250 OP 250 PS 637: Performed by: PHYSICIAN ASSISTANT

## 2024-12-29 PROCEDURE — 120N000004 HC R&B MS OVERFLOW

## 2024-12-29 PROCEDURE — 272N000570 HC SHEATH CR7

## 2024-12-29 PROCEDURE — 272N000280 HC DEVICE COMPRESSION CR5

## 2024-12-29 PROCEDURE — 250N000011 HC RX IP 250 OP 636: Performed by: PSYCHIATRY & NEUROLOGY

## 2024-12-29 PROCEDURE — 272N000192 HC ACCESSORY CR2

## 2024-12-29 PROCEDURE — 5A1955Z RESPIRATORY VENTILATION, GREATER THAN 96 CONSECUTIVE HOURS: ICD-10-PCS | Performed by: INTERNAL MEDICINE

## 2024-12-29 PROCEDURE — 255N000002 HC RX 255 OP 636: Performed by: INTERNAL MEDICINE

## 2024-12-29 PROCEDURE — 36415 COLL VENOUS BLD VENIPUNCTURE: CPT | Performed by: HOSPITALIST

## 2024-12-29 PROCEDURE — 92610 EVALUATE SWALLOWING FUNCTION: CPT | Mod: GN

## 2024-12-29 PROCEDURE — 85576 BLOOD PLATELET AGGREGATION: CPT

## 2024-12-29 RX ORDER — NALOXONE HYDROCHLORIDE 0.4 MG/ML
0.2 INJECTION, SOLUTION INTRAMUSCULAR; INTRAVENOUS; SUBCUTANEOUS
Status: DISCONTINUED | OUTPATIENT
Start: 2024-12-29 | End: 2024-12-29 | Stop reason: HOSPADM

## 2024-12-29 RX ORDER — HEPARIN SODIUM 200 [USP'U]/100ML
1 INJECTION, SOLUTION INTRAVENOUS CONTINUOUS PRN
Status: DISCONTINUED | OUTPATIENT
Start: 2024-12-29 | End: 2024-12-29 | Stop reason: HOSPADM

## 2024-12-29 RX ORDER — PROPOFOL 10 MG/ML
5-75 INJECTION, EMULSION INTRAVENOUS CONTINUOUS
Status: DISCONTINUED | OUTPATIENT
Start: 2024-12-29 | End: 2025-01-06

## 2024-12-29 RX ORDER — NOREPINEPHRINE BITARTRATE 0.02 MG/ML
.01-.6 INJECTION, SOLUTION INTRAVENOUS CONTINUOUS
Status: DISCONTINUED | OUTPATIENT
Start: 2024-12-29 | End: 2025-01-04

## 2024-12-29 RX ORDER — IOPAMIDOL 755 MG/ML
67 INJECTION, SOLUTION INTRAVASCULAR ONCE
Status: COMPLETED | OUTPATIENT
Start: 2024-12-29 | End: 2024-12-29

## 2024-12-29 RX ORDER — NALOXONE HYDROCHLORIDE 0.4 MG/ML
0.4 INJECTION, SOLUTION INTRAMUSCULAR; INTRAVENOUS; SUBCUTANEOUS
Status: DISCONTINUED | OUTPATIENT
Start: 2024-12-29 | End: 2024-12-29 | Stop reason: HOSPADM

## 2024-12-29 RX ORDER — LOPERAMIDE HYDROCHLORIDE 2 MG/1
2 CAPSULE ORAL 2 TIMES DAILY PRN
Status: DISCONTINUED | OUTPATIENT
Start: 2024-12-29 | End: 2025-01-06

## 2024-12-29 RX ORDER — SODIUM CHLORIDE, SODIUM LACTATE, POTASSIUM CHLORIDE, CALCIUM CHLORIDE 600; 310; 30; 20 MG/100ML; MG/100ML; MG/100ML; MG/100ML
INJECTION, SOLUTION INTRAVENOUS CONTINUOUS
Status: DISCONTINUED | OUTPATIENT
Start: 2024-12-29 | End: 2024-12-29

## 2024-12-29 RX ORDER — SODIUM CHLORIDE 9 MG/ML
INJECTION, SOLUTION INTRAVENOUS CONTINUOUS
Status: DISCONTINUED | OUTPATIENT
Start: 2024-12-29 | End: 2024-12-29 | Stop reason: HOSPADM

## 2024-12-29 RX ORDER — HEPARIN SODIUM 10000 [USP'U]/100ML
0-5000 INJECTION, SOLUTION INTRAVENOUS CONTINUOUS
Status: DISCONTINUED | OUTPATIENT
Start: 2024-12-29 | End: 2024-12-30

## 2024-12-29 RX ORDER — FENTANYL CITRATE 50 UG/ML
25-50 INJECTION, SOLUTION INTRAMUSCULAR; INTRAVENOUS EVERY 5 MIN PRN
Status: DISCONTINUED | OUTPATIENT
Start: 2024-12-29 | End: 2024-12-29 | Stop reason: HOSPADM

## 2024-12-29 RX ORDER — PROPOFOL 10 MG/ML
INJECTION, EMULSION INTRAVENOUS PRN
Status: DISCONTINUED | OUTPATIENT
Start: 2024-12-29 | End: 2024-12-29

## 2024-12-29 RX ORDER — SODIUM CHLORIDE 9 MG/ML
INJECTION, SOLUTION INTRAVENOUS CONTINUOUS
Status: DISCONTINUED | OUTPATIENT
Start: 2024-12-29 | End: 2024-12-30

## 2024-12-29 RX ORDER — IOPAMIDOL 612 MG/ML
100 INJECTION, SOLUTION INTRAVASCULAR ONCE
Status: COMPLETED | OUTPATIENT
Start: 2024-12-29 | End: 2024-12-29

## 2024-12-29 RX ORDER — FLUMAZENIL 0.1 MG/ML
0.2 INJECTION, SOLUTION INTRAVENOUS
Status: DISCONTINUED | OUTPATIENT
Start: 2024-12-29 | End: 2024-12-29 | Stop reason: HOSPADM

## 2024-12-29 RX ORDER — LIDOCAINE 40 MG/G
CREAM TOPICAL
Status: DISCONTINUED | OUTPATIENT
Start: 2024-12-29 | End: 2024-12-29 | Stop reason: HOSPADM

## 2024-12-29 RX ORDER — HEPARIN SODIUM 200 [USP'U]/100ML
1 INJECTION, SOLUTION INTRAVENOUS EVERY 5 MIN PRN
Status: DISCONTINUED | OUTPATIENT
Start: 2024-12-29 | End: 2024-12-29 | Stop reason: HOSPADM

## 2024-12-29 RX ADMIN — FENTANYL CITRATE 50 MCG: 50 INJECTION, SOLUTION INTRAMUSCULAR; INTRAVENOUS at 21:34

## 2024-12-29 RX ADMIN — HEPARIN SODIUM 1100 UNITS/HR: 10000 INJECTION, SOLUTION INTRAVENOUS at 18:06

## 2024-12-29 RX ADMIN — SODIUM CHLORIDE 80 ML: 9 INJECTION, SOLUTION INTRAVENOUS at 20:19

## 2024-12-29 RX ADMIN — PROPOFOL 80 MG: 10 INJECTION, EMULSION INTRAVENOUS at 20:46

## 2024-12-29 RX ADMIN — PROPOFOL 20 MG: 10 INJECTION, EMULSION INTRAVENOUS at 20:47

## 2024-12-29 RX ADMIN — FENTANYL CITRATE 50 MCG: 50 INJECTION, SOLUTION INTRAMUSCULAR; INTRAVENOUS at 21:15

## 2024-12-29 RX ADMIN — NOREPINEPHRINE BITARTRATE 0.05 MCG/KG/MIN: 0.02 INJECTION, SOLUTION INTRAVENOUS at 21:37

## 2024-12-29 RX ADMIN — LOPERAMIDE HYDROCHLORIDE 2 MG: 2 CAPSULE ORAL at 19:16

## 2024-12-29 RX ADMIN — FENTANYL CITRATE 50 MCG: 50 INJECTION, SOLUTION INTRAMUSCULAR; INTRAVENOUS at 22:49

## 2024-12-29 RX ADMIN — IOPAMIDOL 67 ML: 755 INJECTION, SOLUTION INTRAVENOUS at 20:19

## 2024-12-29 RX ADMIN — MIDAZOLAM 1 MG: 1 INJECTION INTRAMUSCULAR; INTRAVENOUS at 22:48

## 2024-12-29 RX ADMIN — ATORVASTATIN CALCIUM 80 MG: 80 TABLET, FILM COATED ORAL at 19:16

## 2024-12-29 RX ADMIN — PROPOFOL 50 MCG/KG/MIN: 10 INJECTION, EMULSION INTRAVENOUS at 20:49

## 2024-12-29 RX ADMIN — MIDAZOLAM 1 MG: 1 INJECTION INTRAMUSCULAR; INTRAVENOUS at 22:20

## 2024-12-29 RX ADMIN — PANTOPRAZOLE SODIUM 40 MG: 40 INJECTION, POWDER, FOR SOLUTION INTRAVENOUS at 16:09

## 2024-12-29 RX ADMIN — TICAGRELOR 90 MG: 90 TABLET ORAL at 07:45

## 2024-12-29 RX ADMIN — FENTANYL CITRATE 50 MCG: 50 INJECTION, SOLUTION INTRAMUSCULAR; INTRAVENOUS at 22:22

## 2024-12-29 RX ADMIN — SODIUM CHLORIDE, POTASSIUM CHLORIDE, SODIUM LACTATE AND CALCIUM CHLORIDE: 600; 310; 30; 20 INJECTION, SOLUTION INTRAVENOUS at 16:09

## 2024-12-29 RX ADMIN — IOPAMIDOL 50 ML: 612 INJECTION, SOLUTION INTRAVENOUS at 22:55

## 2024-12-29 RX ADMIN — VERAPAMIL HYDROCHLORIDE: 2.5 INJECTION INTRAVENOUS at 21:20

## 2024-12-29 RX ADMIN — SUCCINYLCHOLINE CHLORIDE 100 MG: 20 INJECTION, SOLUTION INTRAMUSCULAR; INTRAVENOUS; PARENTERAL at 20:46

## 2024-12-29 RX ADMIN — APIXABAN 5 MG: 5 TABLET, FILM COATED ORAL at 07:45

## 2024-12-29 RX ADMIN — MIDAZOLAM 2 MG: 1 INJECTION INTRAMUSCULAR; INTRAVENOUS at 21:13

## 2024-12-29 RX ADMIN — PROPOFOL 65 MCG/KG/MIN: 10 INJECTION, EMULSION INTRAVENOUS at 23:11

## 2024-12-29 RX ADMIN — PROPOFOL 75 MCG/KG/MIN: 10 INJECTION, EMULSION INTRAVENOUS at 21:00

## 2024-12-29 RX ADMIN — LIDOCAINE HYDROCHLORIDE 2 ML: 10 INJECTION, SOLUTION INFILTRATION; PERINEURAL at 21:14

## 2024-12-29 RX ADMIN — HEPARIN SODIUM 6 BAG: 200 INJECTION, SOLUTION INTRAVENOUS at 22:49

## 2024-12-29 ASSESSMENT — ACTIVITIES OF DAILY LIVING (ADL)
ADLS_ACUITY_SCORE: 46
ADLS_ACUITY_SCORE: 64
ADLS_ACUITY_SCORE: 46
ADLS_ACUITY_SCORE: 46
ADLS_ACUITY_SCORE: 67
ADLS_ACUITY_SCORE: 58
ADLS_ACUITY_SCORE: 67
ADLS_ACUITY_SCORE: 64
ADLS_ACUITY_SCORE: 67
ADLS_ACUITY_SCORE: 58
ADLS_ACUITY_SCORE: 46
ADLS_ACUITY_SCORE: 46
ADLS_ACUITY_SCORE: 58
ADLS_ACUITY_SCORE: 64
ADLS_ACUITY_SCORE: 58
ADLS_ACUITY_SCORE: 46
ADLS_ACUITY_SCORE: 58
ADLS_ACUITY_SCORE: 58
ADLS_ACUITY_SCORE: 46
ADLS_ACUITY_SCORE: 58
ADLS_ACUITY_SCORE: 58
ADLS_ACUITY_SCORE: 64
ADLS_ACUITY_SCORE: 46

## 2024-12-29 ASSESSMENT — LIFESTYLE VARIABLES: TOBACCO_USE: 1

## 2024-12-29 NOTE — PROGRESS NOTES
Bedside Swallow Evaluation      12/29/24 1200   Appointment Info   Signing Clinician's Name / Credentials (SLP) Lisseth Mueller MA, CCC-SLP   General Information   Onset of Illness/Injury or Date of Surgery 12/25/24   Referring Physician Katie Bay, DO   Patient/Family Therapy Goal Statement (SLP) Pt is having extreme difficulty swallowing   Pertinent History of Current Problem per MD note: Elio Aguilar is a 79 year old male with history of HTN, HLD, ANGEL, Reye's Syndrome who was admitted on 12/25/2024 with concern for posterior circulation TIA vs CVA. Was supposed to d/c yesterday to home but episodes of dysarthria delayed discharge. Now having significant difficulties with swallowing, coughing, and ongoing strangled/slurred speech.   General Observations Sitting in bed, family at bedside.   Type of Evaluation   Type of Evaluation Swallow Evaluation   Oral Motor   Oral Musculature generally intact   Dentition (Oral Motor)   Dentition (Oral Motor) natural dentition   Facial Symmetry (Oral Motor)   Facial Symmetry (Oral Motor) WNL   Lip Function (Oral Motor)   Lip Range of Motion (Oral Motor) WNL   Tongue Function (Oral Motor)   Tongue ROM (Oral Motor) WNL   Jaw Function (Oral Motor)   Jaw Function (Oral Motor) not tested   Cough/Swallow/Gag Reflex (Oral Motor)   Soft Palate/Velum (Oral Motor) WNL   Volitional Throat Clear/Cough (Oral Motor) WNL   Volitional Swallow (Oral Motor) WNL   Vocal Quality/Secretion Management (Oral Motor)   Vocal Quality (Oral Motor) strained/strangled   Secretion Management (Oral Motor) WNL   General Swallowing Observations   Past History of Dysphagia Pt was on a regular diet and ready to discharge home yesterday. This AM, RN noted significant difficulty swallowing with reports of inability to swallow liquids and coughing noted.   Respiratory Support room air   Current Diet/Method of Nutritional Intake (General Swallowing Observations, NIS) regular diet;NPO  (Was on regular  but per RN, NPO since this AM given difficulty swallowing)   Swallowing Evaluation Clinical swallow evaluation   Clinical Swallow Evaluation   Feeding Assistance no assistance needed   Additional evaluation(s) completed today Yes;Recommended   Rationale for completing additional evaluation VFSS   Clinical Swallow Evaluation Textures Trialed thin liquids;mildly thick liquids;pureed   Clinical Swallow Eval: Thin Liquid Texture Trial   Mode of Presentation, Thin Liquids spoon;fed by clinician   Volume of Liquid or Food Presented <15mL   Oral Phase of Swallow effortful AP movement   Pharyngeal Phase of Swallow impaired;coughing/choking;repeated swallows;throat clearing;feeling of something stuck in throat   Diagnostic Statement Pt tolerated 2 ice chips with need for repeated swallows. With teaspoon presentation, suspect premature pharyngeal entry, with gulping and overt coughing observed with wet vocal quality.   Clinical Swallow Eval: Mildly Thick Liquids   Mode of Presentation spoon;fed by clinician   Volume Presented 2 teaspoons   Oral Phase effortful AP movement   Pharyngeal Phase impaired;coughing/choking;repeated swallows;throat clearing;feeling of something stuck in throat   Diagnostic Statement Similiar presentation to thin with coughing and repeated swallows noted.   Clinical Swallow Evaluation: Puree Solid Texture Trial   Mode of Presentation, Puree spoon;fed by clinician   Volume of Puree Presented 3 half teaspoons   Oral Phase, Puree effortful AP movement   Pharyngeal Phase, Puree impaired;repeated swallows   Diagnostic Statement Slight improvements with very small amounts, repeated swallows needed but no overt s/sx of aspiration   Esophageal Phase of Swallow   Patient reports or presents with symptoms of esophageal dysphagia Yes   Esophageal comments Reports reflux at baseline. Complaining of heartburn sensation during evaluation.   Swallowing Recommendations   Diet Consistency Recommendations NPO;ice chips  only  (1-3 HALF TEASPOONS purees with RN for comfort/meds)   Supervision Level for Intake 1:1 supervision needed   Medication Administration Recommendations, Swallowing (SLP) Crush meds in half teaspoon of purees   Instrumental Assessment Recommendations VFSS (videofluoroscopic swallowing study)   Comment, Swallowing Recommendations Patient with overt coughing and choking on thin liquids and mildly thick via teaspoon presenstion. Slight improvements with ice chips and half teaspoon of purees. Required repeated swallows for all offerings. Reported feeling that something was stuck and attempting to cough out. Cough sounding dry and harsh with voice sounding strained/strangled.   General Therapy Interventions   Planned Therapy Interventions Dysphagia Treatment   Dysphagia treatment Modified diet education;Instruction of safe swallow strategies;Compensatory strategies for swallowing   Clinical Impression   Criteria for Skilled Therapeutic Interventions Met (SLP Jenna) Yes, treatment indicated   SLP Diagnosis severe oral pharyngeal dysphagia   Risks & Benefits of therapy have been explained evaluation/treatment results reviewed;care plan/treatment goals reviewed;risks/benefits reviewed;current/potential barriers reviewed;participants voiced agreement with care plan;participants included;patient;spouse/significant other;daughter   Clinical Impression Comments Patient presents with presumably sudden onset severe dysphagia. Discharge delayed yesterday due to ongoing episodes of dysarthria. Now this AM, unable to swallow liquids without coughing and speech noted to be thick, strained, and stangled. Pt reporting that he feels like something is stuck in his throat that he cannot cough up. He also feels like he is having reflux/heartburn. Intermittent feeling of SOB. He ate dinner last night (pot roast) without difficulty per his report. He demonstrated slight tolerance of ice chips and small volume purees. Overt coughing with  thin liquids and mildly thick liquids. Recommend NPO with completion of VFSS tomorrow, 12/30. Vocal hygiene given amount pt is demonstrating harsh cough. OK for ice chips with RN and 1-3 half teaspoons of purees for comfort/with crushed meds. If pt demonstrates worsening respiratory status, or inability to swallow+ coughing- hold PO. Consider GI consult and/or ENT to assess airway. VFSS tomorrow 12/30   SLP Total Evaluation Time   Eval: oral/pharyngeal swallow function, clinical swallow Minutes (64964) 15   SLP Goals   Therapy Frequency (SLP Eval) daily   SLP Predicted Duration/Target Date for Goal Attainment 01/15/25   SLP Goals Swallow   SLP: Safely tolerate diet without signs/symptoms of aspiration Regular diet;Thin liquids;Independently   Interventions   Interventions Quick Adds Swallowing Dysfunction   Swallowing Intervention   Treatment of Swallowing Dysfunction &/or Oral Function for Feeding Minutes (43036) 15   Symptoms Noted During/After Treatment None   Treatment Detail/Skilled Intervention Extensive education with wife and daughter at bedside regarding possible reasons, VFSS, and plan. Pt and family demonstrated understanding and asked appropriate questions. Pt wondering if it is all related to stroke, appearing anxious about what to do. SLP discussed reaching out to MD and RN for further assessment given that per pt and family, it appears worse in the last 2 hr with consistent throat clearing and coughing, uncomfortable.   SLP Discharge Planning   SLP Plan VFSS   SLP Discharge Recommendation [facility]-Motivated patient will benefit from intensive, interdisciplinary therapy.  Anticipate will be able to tolerate 3 hours of therapy per day;home with outpatient therapy services   SLP Rationale for DC Rec Pending medical status and VFSS results, will determine discharge dispo   SLP Brief overview of current status  Clinical swallow evaluation completed with severe and sudden dysphagia noted.  Discussed with family and RN.     Recommend NPO with completion of VFSS tomorrow, 12/30. Vocal hygiene given amount pt is demonstrating harsh cough. OK for ice chips with RN and 1-3 half teaspoons of purees for comfort/with crushed meds. If pt demonstrates worsening respiratory status, or inability to swallow+ coughing- hold PO. Consider GI consult and/or ENT to assess airway. VFSS tomorrow 12/30   SLP Time and Intention   Total Session Time (sum of timed and untimed services) 30

## 2024-12-29 NOTE — PROGRESS NOTES
[facility]    Medicine Progress Note - Hospitalist Service    Date of Admission:  12/25/2024    Assessment & Plan   Elio Aguilar is a 79 year old male with history of HTN, HLD, ANGEL, Reye's Syndrome who was admitted on 12/25/2024 with concern for posterior circulation TIA vs CVA.     Concern for posterior circulation TIA vs CVA  High-grade stenosis of proximal-mid basilar artery  High grade stenosis of L PCA P1 segment  ICAD involving basilar artery, L PCA, R PCA, L MCA, supraclinoid R ICA   Hx of L posterior corona radiata CVA (2010)  Hx of PFO s/p closure (2010, [place])  Hx of tobacco use  Patient with history of CVA in setting of PFO presenting with acute onset dizziness and speech changes found to have high-grade basilar artery and left PCA P1 segment stenosis without acute hemorrhage or ischemic infarct on CT.  Symptoms quickly resolved; stroke neurology involved in the ED and patient was loaded with aspirin and Plavix.  Remains hemodynamically stable with no ongoing deficits on exam. In setting of prior CVA and suspected TIA, patient will need aggressive risk factor modification.   *12/25 MRI head: normal for age  *12/25 MRA Head: absent flow at basilar artery, likely severe stenosis less likely occlusion. Additional multifocal atherosclerosis including moderate stenosis of R MCA.  *12/26 recurrent symptoms ~1h after sitting upright, resolved while laying flat  *12/26 Echo: EF 60-65%, RV normal. IVC normal. Same as 2010 study  *12/27 CTA: some improvement in flow in basilar artery  *12/28 MRI/MRA: indeterminate hyperintensity in ventral armin, ?evolving ischemic change vs artifact. Mild volume loss and chronic microvascular ischemic disease. Unchanged high grade stenoses/loss of flow at basilar artery. Unchanged stenosis left M1 segment. Nonflow limiting plaque at R carotid bifurcation.  *transitioned from heparin gtt to eliquis BID on 12/27 evening  -Allow moderate hypertension and  follow up with PCP with daily BP checks upon return to [place]  - atorvastatin 80mg daily  -Outpatient sleep medicine referral on discharge  -Stroke Neurology following, recommends repeat CT imaging in 6-8 weeks with neurology follow-up at that time to determine length of time for eliquis + brilinta.  - follow up with PCP upon return to [place] with CBC/CMP in 2-4 weeks.  **Discharge cancelled 12/28 as he had change in voice with more slurring when he was getting dressed to leave and also had a crying episode   -neurology re-evaluated   - ASA switched to brilinta 90mg BID    Free one month voucher from pharmacy, then 107$ per month   **12/29 worsening dysarthria in AM, swallow effected, SLP recommends NPO except crushed meds. Video swallow planned 12/30 if able to coordinate around neuro IR   - P2Y12 pending   - Neuro IR consulted to evaluate for DSA, NPO at midnight  - IVF @75ml/hr while NPO, awaiting   - eliquis switched back to heparin gtt on 12/29  -q2h neuro checks     CKD Stage 3a  Prior creatinine levels around 1.3 (in 2010), creatinine 1.44 on admission. Cr remains stable 1.3-1.5 this admit.    Rhinorrhea, loose stool, shortness of breath  Sudden onset of symptoms 12/29 AM  - SOB could be secondary to difficulty swallowing/lying on his back with drainage vs side effect of brilinta  - COVID/Flu/RSV all negative. CXR clear  - frequent loose stools, enteric panel negative for norovirus. ? Diarrhea side effect from atorvastatin   - BID imodium PRN  - continuous O2 monitoring    Acid reflux  - protonix IV daily added 12/29     HTN, suspected - No PTA medications. Allowing moderate HTN as above.  HLD - Total Cholesterol 222, , HDL 38, trigs 265.  ANGEL - No PTA CPAP use. Sleep Medicine referral on discharge.  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 Reportedly comatose for unknown time with full recovery.     Elevated TSH  T4 free WNL  - check TSH in 4-5 weeks with PCP           Diet: Diet  NPO for Medical/Clinical Reasons Except for: Other; Specify: OK for ice chips with RN, meds crushed in puree, 1-3 HALF TEASPOONS at a time of applesauce/pudding with RN as desired  NPO per Anesthesia Guidelines for Procedure/Surgery Except for: Meds    DVT Prophylaxis: DOAC  Whitehead Catheter: Not present  Lines: None     Cardiac Monitoring: ACTIVE order. Indication: Stroke, acute (48 hours)  Code Status: Full Code      Clinically Significant Risk Factors          # Hyperchloremia: Highest Cl = 108 mmol/L in last 2 days, will monitor as appropriate                         # Overweight: Estimated body mass index is 27.12 kg/m  as calculated from the following:    Height as of this encounter: 1.829 m (6').    Weight as of this encounter: 90.7 kg (200 lb).        # Financial/Environmental Concerns: none         Social Drivers of Health            Disposition Plan     Medically Ready for Discharge: Anticipated in 2-4 Days  Pending neuro IR recommendations           Katie Bay DO  Hospitalist Service  [facility]  Securely message with [employer] (more info)  Text page via [employer] Paging/Directory   ______________________________________________________________________    Interval History   Patient seen and examined. Worse dysarthria today, lot of phlegm with upper airway congestion. Breathing feels more difficult. Having to lay flat to help with his symptoms. Felt weak when getting up to the bathroom. He was made NPO with SLP due to his speech/swallow. He had frequent loose stools and studies have returned negative for enteric panel. Discussed with RN x3. Discussed with laboratory. Discussed with neurology.    Physical Exam   Vital Signs: Temp: 97.2  F (36.2  C) Temp src: Oral BP: (!) 144/68 Pulse: 67   Resp: 16 SpO2: 98 % O2 Device: None (Room air)    Weight: 200 lbs 0 oz    Constitutional: Awake, alert, cooperative, tearful  Respiratory: Clear to auscultation bilaterally, no  crackles or wheezing  Cardiovascular: Regular rate and rhythm, normal S1 and S2, and no murmur noted  GI: Normal bowel sounds, soft, non-distended, non-tender  Skin/Integumen: No rashes, no cyanosis, no edema  Other:   speech slurred with thick upper airway congestion, weak cough.     Medical Decision Making       65 MINUTES SPENT BY ME on the date of service doing chart review, history, exam, documentation & further activities per the note.      Data     I have personally reviewed the following data over the past 24 hrs:    11.5 (H)  \   17.0   / 189     141 108 (H) 16.6 /  103 (H)   4.4 20 (L) 1.41 (H) \       Imaging results reviewed over the past 24 hrs:   No results found for this or any previous visit (from the past 24 hours).

## 2024-12-29 NOTE — PROGRESS NOTES
Reason for Admission: High-grade stenosis of proximal-mid basilar artery and left PCA  Cognitive/Mentation: A/Ox 4  Neuros/CMS: Stable  VS: VSS on RA.   Tele: Sinus Saeed.  GI/: BS clear, + flatus,  Continent.  Pulmonary: LS clear.  Pain: denies.   Drains/Lines: PIV SL  Skin: intact  Activity: SBA  Diet: regular with thin liquids. Takes pills whole.   Therapies recs: home  Discharge: today   Aggression Stoplight Tool: green  Summary: had episode of dysarthria resolved with laying flat

## 2024-12-29 NOTE — PLAN OF CARE
/77 (BP Location: Right arm)   Pulse 68   Temp 97.2  F (36.2  C) (Oral)   Resp 16   Ht 1.829 m (6')   Wt 90.7 kg (200 lb)   SpO2 98%   BMI 27.12 kg/m      Patient name: Elio Aguilar    Summary: Patient here with TIA and now possible acute stroke. Patient is worse today than yesterday, more dizzy and unstable, with more dysarthria. In addition he has a lot of phlem in his nose throat and lungs, making him feel that breathing is a bit hard. Hospitalist and neurology kept updated in regards to all these things. Hospitalist ordered respiratory panel which was negative, as well as chest xray. Speech therapy was reconsulted and have now placed him NPO for swallowing difficulty, a big change from previously where he was on a regular diet. Patient is also having loose stools and getting up to the bathroom frequently, stool sample sent for norovirus.     Jonathan Ware, RN  [facility] Neuro Unit

## 2024-12-29 NOTE — INTERIM SUMMARY
"Brief Stroke Note:     Discussed case with neuro IR. Plan for cerebral angiogram tomorrow, could potentially be sooner if he decompensates overnight.     - Eliquis stopped, low intensity heparin drip with loading dose and subsequent boluses ordered.  - Q2 hour neurochecks overnight  - NPO for procedure    Please STAT page on call stroke provider for acute change in neuro exam.     FLORY Ruth, CNP  Neurology  12/29/2024 5:18 PM  To page stroke neurology after hours or on a subsequent day, click here: [facility]  Choose \"On Call\" tab at top, then search dropdown box for \"Neurology Adult\" & press Enter, look for Neuro ICU/Stroke       "

## 2024-12-29 NOTE — PROGRESS NOTES
[facility]    Vascular Neurology Progress Note    Interval History     - Per chart, had one episode of dysarthria that improved with lying flat.   - Notified by RN of worsening dysarthria, dizziness, reduced clearance of secretions. Recommended return to bed with HOB reclined (as tolerated). NIHSS now 3 with new R arm and leg ataxia.     Hospital Course     Chief complaint: Dizziness (Sudden onset dizziness/sweating/family reported some slurred speech that was brief and gone by the time medics arrived. Was just sitting when episode happened.  No dizziness w change of position.  Off balance. +5 strength. 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 Presented 12/25 w/ acute onset lightheadedness, diaphoresis, dysarthria without clear focal weakness or facial droop.    Assessment and Plan     Fluctuating dysarthria, now with right sided ataxia and reduced clearance of secretions. Repeat MRI with concern for new pontine infarct.  Multifocal ICAD involving the basilar artery, left MCA, bilateral PCAs. Given some improvement in basilar flow after anticoagulation, suspect this is an acute on chronic process.    - Neurochecks and Vital Signs every 4 hours  - Continue Eliquis 5 mg twice daily.  - Loaded with Brilinta 12/28. Continue 90 mg twice daily.  --- P2Y12 ordered  - Statin: Atorvastatin 80 mg, LDL goal 40-70  - Permissive HTN; goal SBP < 220 mmHg.  Recommend allowing for moderate permissive hypertension until he follows up with PCP.  If he remains stable, can slowly lower BP in the coming weeks/months.  - PT/OT/SLP  - Stroke  "Education  - Refer for ANGEL eval at discharge  - Euthermia, Euglycemia  - Neuro IR consulted to evaluate for DSA, NPO after midnight    DVT Prophylaxis: Eliquis    Patient Follow-up    -He will need to establish and follow-up with PCP upon return to [place] and be referred for follow-up with stroke neurology locally.  Would recommend repeat imaging in 6 to 8 weeks to help determine duration of Eliquis plus Brilinta.    We will continue to follow.     Katrin Malcolm, CNP  Vascular Neurology    To page me or covering stroke neurology team member, click here: AMCOM  Choose \"On Call\" tab at top, then select \"NEUROLOGY/ALL SITES\" from middle drop-down box, press Enter, then look for \"stroke\" or \"telestroke\" for your site.    Physical Examination     Temp: 97.6  F (36.4  C) Temp src: Oral BP: 131/73 Pulse: 60   Resp: 16 SpO2: 97 % O2 Device: None (Room air)      Neurologic  Mental Status:  alert, oriented x 3, follows commands, speech clear and fluent, naming and repetition normal, mild to moderate dysarthria, increased throat clearing  Cranial Nerves:  visual fields intact, EOMI with normal smooth pursuit, facial sensation intact and symmetric, facial movements symmetric, hearing not formally tested but intact to conversation, tongue protrusion midline  Motor:  normal muscle tone and bulk, no abnormal movements, able to move all limbs spontaneously, no pronator drift  Reflexes:   Deferred  Sensory:  light touch sensation intact and symmetric throughout upper and lower extremities, no extinction on double simultaneous stimulation   Coordination:  ataxia in right arm and leg  Station/Gait:  deferred    Stroke Scales       NIHSS  National Institutes of Health Stroke Scale  Exam Interval: 12/29/24   Score    Level of consciousness: (0)   Alert, keenly responsive    LOC questions: (0)   Answers both questions correctly    LOC commands: (0)   Performs both tasks correctly    Best gaze: (0)   Normal    Visual: (0)   No visual " loss    Facial palsy: (0)   Normal symmetrical movements    Motor arm (left): (0)   No drift    Motor arm (right): (0)   No drift    Motor leg (left): (0)   No drift    Motor leg (right): (0)   No drift    Limb ataxia: (2)   Present in two limbs    Sensory: (0)   Normal- no sensory loss    Best language: (0)   Normal- no aphasia    Dysarthria: (1)   Mild to moderate dysarthria    Extinction and inattention: (0)   No abnormality        Total Score:  3       Imaging/Labs   (Bolded imaging and labs new and/or personally reviewed or re-reviewed by me today)    MRI and/or Head CT MRI 12/28: Indeterminate diffusion hyperintensity in the ventral armin, without a definite T2 or ADC correlate. Findings may reflect evolving ischemic change or artifact from the adjacent skull base.   MRI: No acute stroke   Intracranial Vasculature MRA 12/28: Unchanged    CTA 12/27: Formally read as stable from prior, however feel there is some improvement in basilar stenosis on stroke team review.    CTA: Multifocal high-grade stenosis/near occlusion of the proximal/mid basilar, bilateral PCA stenosis, moderate left M1 stenosis, moderate supraclinoid right ICA stenosis     MRA: Absent flow related contrast at the basilar artery, multifocal iCAD   Cervical Vasculature CTA: 40 to 50% right cervical ICA stenosis      Echocardiogram EF 60 to 65%, no regional wall motion abnormalities, normal left atrial size   EKG/Telemetry SR   Other Testing Not Applicable      LDL  12/25/2024: 131 mg/dL   A1C  12/25/2024: 5.1 %   Troponin No lab value available in past 48 hrs     Other labs reviewed by me today:  CBC, BMP    Time Spent on this Encounter   Billing: I have personally spent a total of 25 minutes providing care today, time spent in reviewing medical records and devising the plan as recorded above.

## 2024-12-29 NOTE — PLAN OF CARE
Goal Outcome Evaluation:      Plan of Care Reviewed With: patient, family    Overall Patient Progress: improvingOverall Patient Progress: improving    Orientation/Cognitive: A/Ox4  Mobility Level/Assist Equipment: SBA  Fall Risk (Y/N): yes  Behavior Concerns: none  Pain Management: none reported  Tele/VS/O2: VSS on RA, tele NSR  ABNL Lab/BG: creatinine 1.36, brain MRI results pending  Diet: reg  Bowel/Bladder: continent  Skin Concerns: intact  Drains/Devices: R PIV SL  Tests/Procedures for next shift: neurology following  Anticipated DC date & active delays: tbd

## 2024-12-30 LAB
ALBUMIN SERPL BCG-MCNC: 3.9 G/DL (ref 3.5–5.2)
ALBUMIN UR-MCNC: 30 MG/DL
ALLEN'S TEST: YES
ALP SERPL-CCNC: 68 U/L (ref 40–150)
ALT SERPL W P-5'-P-CCNC: 104 U/L (ref 0–70)
ANION GAP SERPL CALCULATED.3IONS-SCNC: 13 MMOL/L (ref 7–15)
APPEARANCE UR: ABNORMAL
APTT PPP: 22 SECONDS (ref 22–38)
AST SERPL W P-5'-P-CCNC: 75 U/L (ref 0–45)
BASE EXCESS BLDA CALC-SCNC: -3.7 MMOL/L (ref -3–3)
BILIRUB SERPL-MCNC: 0.6 MG/DL
BILIRUB UR QL STRIP: NEGATIVE
BUN SERPL-MCNC: 16.1 MG/DL (ref 8–23)
CA-I BLD-MCNC: 4.2 MG/DL (ref 4.4–5.2)
CALCIUM SERPL-MCNC: 8.5 MG/DL (ref 8.8–10.4)
CHLORIDE SERPL-SCNC: 106 MMOL/L (ref 98–107)
COLOR UR AUTO: YELLOW
CREAT SERPL-MCNC: 1.43 MG/DL (ref 0.67–1.17)
EGFRCR SERPLBLD CKD-EPI 2021: 49 ML/MIN/1.73M2
ERYTHROCYTE [DISTWIDTH] IN BLOOD BY AUTOMATED COUNT: 12.3 % (ref 10–15)
GLUCOSE BLDC GLUCOMTR-MCNC: 105 MG/DL (ref 70–99)
GLUCOSE BLDC GLUCOMTR-MCNC: 107 MG/DL (ref 70–99)
GLUCOSE BLDC GLUCOMTR-MCNC: 129 MG/DL (ref 70–99)
GLUCOSE BLDC GLUCOMTR-MCNC: 134 MG/DL (ref 70–99)
GLUCOSE BLDC GLUCOMTR-MCNC: 68 MG/DL (ref 70–99)
GLUCOSE BLDC GLUCOMTR-MCNC: 69 MG/DL (ref 70–99)
GLUCOSE BLDC GLUCOMTR-MCNC: 70 MG/DL (ref 70–99)
GLUCOSE BLDC GLUCOMTR-MCNC: 90 MG/DL (ref 70–99)
GLUCOSE BLDC GLUCOMTR-MCNC: 91 MG/DL (ref 70–99)
GLUCOSE BLDC GLUCOMTR-MCNC: 97 MG/DL (ref 70–99)
GLUCOSE SERPL-MCNC: 120 MG/DL (ref 70–99)
GLUCOSE UR STRIP-MCNC: NEGATIVE MG/DL
HCO3 BLD-SCNC: 21 MMOL/L (ref 21–28)
HCO3 SERPL-SCNC: 22 MMOL/L (ref 22–29)
HCT VFR BLD AUTO: 47.5 % (ref 40–53)
HGB BLD-MCNC: 16.6 G/DL (ref 13.3–17.7)
HGB UR QL STRIP: ABNORMAL
INR PPP: 1.26 (ref 0.85–1.15)
KETONES UR STRIP-MCNC: NEGATIVE MG/DL
LACTATE SERPL-SCNC: 2.9 MMOL/L (ref 0.7–2)
LACTATE SERPL-SCNC: 3.3 MMOL/L (ref 0.7–2)
LEUKOCYTE ESTERASE UR QL STRIP: ABNORMAL
MAGNESIUM SERPL-MCNC: 2.1 MG/DL (ref 1.7–2.3)
MCH RBC QN AUTO: 32.4 PG (ref 26.5–33)
MCHC RBC AUTO-ENTMCNC: 34.9 G/DL (ref 31.5–36.5)
MCV RBC AUTO: 93 FL (ref 78–100)
MUCOUS THREADS #/AREA URNS LPF: PRESENT /LPF
NITRATE UR QL: NEGATIVE
O2/TOTAL GAS SETTING VFR VENT: 30 %
OXYHGB MFR BLDA: 95 % (ref 92–100)
PCO2 BLD: 36 MM HG (ref 35–45)
PEEP: 5 CM H2O
PH BLD: 7.37 [PH] (ref 7.35–7.45)
PH UR STRIP: 5.5 [PH] (ref 5–7)
PHOSPHATE SERPL-MCNC: 3.4 MG/DL (ref 2.5–4.5)
PLATELET # BLD AUTO: 136 10E3/UL (ref 150–450)
PO2 BLD: 97 MM HG (ref 80–105)
POTASSIUM SERPL-SCNC: 4.1 MMOL/L (ref 3.4–5.3)
PROT SERPL-MCNC: 6.3 G/DL (ref 6.4–8.3)
RBC # BLD AUTO: 5.12 10E6/UL (ref 4.4–5.9)
RBC URINE: 91 /HPF
SAO2 % BLDA: 97.7 % (ref 95–96)
SARS-COV-2 RNA RESP QL NAA+PROBE: NEGATIVE
SODIUM SERPL-SCNC: 141 MMOL/L (ref 135–145)
SP GR UR STRIP: 1.02 (ref 1–1.03)
SQUAMOUS EPITHELIAL: 1 /HPF
UROBILINOGEN UR STRIP-MCNC: NORMAL MG/DL
WBC # BLD AUTO: 11.5 10E3/UL (ref 4–11)
WBC URINE: 23 /HPF

## 2024-12-30 PROCEDURE — 85048 AUTOMATED LEUKOCYTE COUNT: CPT | Performed by: SURGERY

## 2024-12-30 PROCEDURE — 36415 COLL VENOUS BLD VENIPUNCTURE: CPT

## 2024-12-30 PROCEDURE — 36415 COLL VENOUS BLD VENIPUNCTURE: CPT | Performed by: SURGERY

## 2024-12-30 PROCEDURE — 87040 BLOOD CULTURE FOR BACTERIA: CPT

## 2024-12-30 PROCEDURE — 85610 PROTHROMBIN TIME: CPT | Performed by: SURGERY

## 2024-12-30 PROCEDURE — 250N000009 HC RX 250: Performed by: HOSPITALIST

## 2024-12-30 PROCEDURE — 83605 ASSAY OF LACTIC ACID: CPT | Performed by: SURGERY

## 2024-12-30 PROCEDURE — 258N000003 HC RX IP 258 OP 636: Performed by: SURGERY

## 2024-12-30 PROCEDURE — 250N000013 HC RX MED GY IP 250 OP 250 PS 637: Performed by: SURGERY

## 2024-12-30 PROCEDURE — 250N000013 HC RX MED GY IP 250 OP 250 PS 637

## 2024-12-30 PROCEDURE — 250N000013 HC RX MED GY IP 250 OP 250 PS 637: Performed by: STUDENT IN AN ORGANIZED HEALTH CARE EDUCATION/TRAINING PROGRAM

## 2024-12-30 PROCEDURE — 250N000013 HC RX MED GY IP 250 OP 250 PS 637: Performed by: PHYSICIAN ASSISTANT

## 2024-12-30 PROCEDURE — 85027 COMPLETE CBC AUTOMATED: CPT | Performed by: SURGERY

## 2024-12-30 PROCEDURE — 999N000157 HC STATISTIC RCP TIME EA 10 MIN

## 2024-12-30 PROCEDURE — 250N000013 HC RX MED GY IP 250 OP 250 PS 637: Performed by: PSYCHIATRY & NEUROLOGY

## 2024-12-30 PROCEDURE — 255N000002 HC RX 255 OP 636: Performed by: PHYSICIAN ASSISTANT

## 2024-12-30 PROCEDURE — 87205 SMEAR GRAM STAIN: CPT

## 2024-12-30 PROCEDURE — 99291 CRITICAL CARE FIRST HOUR: CPT | Mod: FS | Performed by: PHYSICIAN ASSISTANT

## 2024-12-30 PROCEDURE — 82040 ASSAY OF SERUM ALBUMIN: CPT | Performed by: SURGERY

## 2024-12-30 PROCEDURE — 82805 BLOOD GASES W/O2 SATURATION: CPT | Performed by: SURGERY

## 2024-12-30 PROCEDURE — 36415 COLL VENOUS BLD VENIPUNCTURE: CPT | Performed by: NURSE PRACTITIONER

## 2024-12-30 PROCEDURE — 999N000009 HC STATISTIC AIRWAY CARE

## 2024-12-30 PROCEDURE — 120N000004 HC R&B MS OVERFLOW

## 2024-12-30 PROCEDURE — 258N000001 HC RX 258: Performed by: SURGERY

## 2024-12-30 PROCEDURE — 99291 CRITICAL CARE FIRST HOUR: CPT | Performed by: INTERNAL MEDICINE

## 2024-12-30 PROCEDURE — 250N000011 HC RX IP 250 OP 636: Performed by: INTERNAL MEDICINE

## 2024-12-30 PROCEDURE — 87635 SARS-COV-2 COVID-19 AMP PRB: CPT | Performed by: SURGERY

## 2024-12-30 PROCEDURE — 82330 ASSAY OF CALCIUM: CPT | Performed by: SURGERY

## 2024-12-30 PROCEDURE — A9585 GADOBUTROL INJECTION: HCPCS | Performed by: PHYSICIAN ASSISTANT

## 2024-12-30 PROCEDURE — 99418 PROLNG IP/OBS E/M EA 15 MIN: CPT | Performed by: HOSPITALIST

## 2024-12-30 PROCEDURE — 81003 URINALYSIS AUTO W/O SCOPE: CPT

## 2024-12-30 PROCEDURE — 87086 URINE CULTURE/COLONY COUNT: CPT

## 2024-12-30 PROCEDURE — 999N000254 HC STATISTIC VENTILATOR TRANSFER

## 2024-12-30 PROCEDURE — 250N000009 HC RX 250: Performed by: INTERNAL MEDICINE

## 2024-12-30 PROCEDURE — 99207 PR NO BILLABLE SERVICE THIS VISIT: CPT | Performed by: SURGERY

## 2024-12-30 PROCEDURE — 80051 ELECTROLYTE PANEL: CPT | Performed by: SURGERY

## 2024-12-30 PROCEDURE — 84100 ASSAY OF PHOSPHORUS: CPT | Performed by: SURGERY

## 2024-12-30 PROCEDURE — 85730 THROMBOPLASTIN TIME PARTIAL: CPT | Performed by: NURSE PRACTITIONER

## 2024-12-30 PROCEDURE — 83735 ASSAY OF MAGNESIUM: CPT | Performed by: SURGERY

## 2024-12-30 PROCEDURE — 258N000003 HC RX IP 258 OP 636: Performed by: INTERNAL MEDICINE

## 2024-12-30 RX ORDER — CHLORHEXIDINE GLUCONATE ORAL RINSE 1.2 MG/ML
15 SOLUTION DENTAL EVERY 12 HOURS
Status: DISCONTINUED | OUTPATIENT
Start: 2024-12-30 | End: 2025-01-06

## 2024-12-30 RX ORDER — LIDOCAINE HYDROCHLORIDE 20 MG/ML
JELLY TOPICAL ONCE
Status: COMPLETED | OUTPATIENT
Start: 2024-12-30 | End: 2024-12-30

## 2024-12-30 RX ORDER — ACETAMINOPHEN 325 MG/1
650 TABLET ORAL EVERY 4 HOURS PRN
Status: DISCONTINUED | OUTPATIENT
Start: 2024-12-30 | End: 2025-01-06

## 2024-12-30 RX ORDER — GADOBUTROL 604.72 MG/ML
8 INJECTION INTRAVENOUS ONCE
Status: COMPLETED | OUTPATIENT
Start: 2024-12-30 | End: 2024-12-30

## 2024-12-30 RX ORDER — DEXTROSE MONOHYDRATE 100 MG/ML
INJECTION, SOLUTION INTRAVENOUS CONTINUOUS PRN
Status: DISCONTINUED | OUTPATIENT
Start: 2024-12-30 | End: 2024-12-31

## 2024-12-30 RX ORDER — DEXTROSE MONOHYDRATE AND SODIUM CHLORIDE 5; .9 G/100ML; G/100ML
INJECTION, SOLUTION INTRAVENOUS CONTINUOUS
Status: DISCONTINUED | OUTPATIENT
Start: 2024-12-30 | End: 2024-12-31

## 2024-12-30 RX ORDER — LABETALOL HYDROCHLORIDE 5 MG/ML
10 INJECTION, SOLUTION INTRAVENOUS EVERY 4 HOURS PRN
Status: DISCONTINUED | OUTPATIENT
Start: 2024-12-30 | End: 2025-01-06

## 2024-12-30 RX ORDER — HEPARIN SODIUM 5000 [USP'U]/.5ML
5000 INJECTION, SOLUTION INTRAVENOUS; SUBCUTANEOUS EVERY 8 HOURS
Status: DISCONTINUED | OUTPATIENT
Start: 2024-12-30 | End: 2025-01-03

## 2024-12-30 RX ORDER — CILOSTAZOL 100 MG/1
100 TABLET ORAL 2 TIMES DAILY
Status: DISCONTINUED | OUTPATIENT
Start: 2024-12-30 | End: 2025-01-06

## 2024-12-30 RX ORDER — DEXTROSE MONOHYDRATE 25 G/50ML
25-50 INJECTION, SOLUTION INTRAVENOUS
Status: DISCONTINUED | OUTPATIENT
Start: 2024-12-30 | End: 2025-01-06

## 2024-12-30 RX ORDER — ACETAMINOPHEN 650 MG/1
650 SUPPOSITORY RECTAL EVERY 4 HOURS PRN
Status: DISCONTINUED | OUTPATIENT
Start: 2024-12-30 | End: 2025-01-06

## 2024-12-30 RX ORDER — NICOTINE POLACRILEX 4 MG
15-30 LOZENGE BUCCAL
Status: DISCONTINUED | OUTPATIENT
Start: 2024-12-30 | End: 2025-01-06

## 2024-12-30 RX ADMIN — LIDOCAINE HYDROCHLORIDE: 20 JELLY TOPICAL at 11:38

## 2024-12-30 RX ADMIN — HEPARIN SODIUM 5000 UNITS: 5000 INJECTION, SOLUTION INTRAVENOUS; SUBCUTANEOUS at 14:42

## 2024-12-30 RX ADMIN — TICAGRELOR 90 MG: 90 TABLET ORAL at 14:03

## 2024-12-30 RX ADMIN — DEXTROSE MONOHYDRATE 50 ML: 25 INJECTION, SOLUTION INTRAVENOUS at 04:15

## 2024-12-30 RX ADMIN — PROPOFOL 55 MCG/KG/MIN: 10 INJECTION, EMULSION INTRAVENOUS at 01:55

## 2024-12-30 RX ADMIN — ATORVASTATIN CALCIUM 80 MG: 80 TABLET, FILM COATED ORAL at 20:36

## 2024-12-30 RX ADMIN — HEPARIN SODIUM 5000 UNITS: 5000 INJECTION, SOLUTION INTRAVENOUS; SUBCUTANEOUS at 22:12

## 2024-12-30 RX ADMIN — SODIUM CHLORIDE, POTASSIUM CHLORIDE, SODIUM LACTATE AND CALCIUM CHLORIDE 250 ML: 600; 310; 30; 20 INJECTION, SOLUTION INTRAVENOUS at 05:20

## 2024-12-30 RX ADMIN — GADOBUTROL 8 ML: 604.72 INJECTION INTRAVENOUS at 18:36

## 2024-12-30 RX ADMIN — DEXTROSE AND SODIUM CHLORIDE: 5; 900 INJECTION, SOLUTION INTRAVENOUS at 08:16

## 2024-12-30 RX ADMIN — PROPOFOL 15 MCG/KG/MIN: 10 INJECTION, EMULSION INTRAVENOUS at 09:37

## 2024-12-30 RX ADMIN — CHLORHEXIDINE GLUCONATE 15 ML: 1.2 SOLUTION ORAL at 07:42

## 2024-12-30 RX ADMIN — ACETAMINOPHEN 650 MG: 325 TABLET, FILM COATED ORAL at 15:35

## 2024-12-30 RX ADMIN — PANTOPRAZOLE SODIUM 40 MG: 40 INJECTION, POWDER, FOR SOLUTION INTRAVENOUS at 07:42

## 2024-12-30 RX ADMIN — CHLORHEXIDINE GLUCONATE 15 ML: 1.2 SOLUTION ORAL at 20:44

## 2024-12-30 RX ADMIN — PROPOFOL 55 MCG/KG/MIN: 10 INJECTION, EMULSION INTRAVENOUS at 05:11

## 2024-12-30 RX ADMIN — CILOSTAZOL 100 MG: 100 TABLET ORAL at 20:36

## 2024-12-30 RX ADMIN — PROPOFOL 30 MCG/KG/MIN: 10 INJECTION, EMULSION INTRAVENOUS at 17:42

## 2024-12-30 RX ADMIN — ACETAMINOPHEN 650 MG: 325 TABLET, FILM COATED ORAL at 20:41

## 2024-12-30 RX ADMIN — DEXTROSE MONOHYDRATE 25 ML: 25 INJECTION, SOLUTION INTRAVENOUS at 07:55

## 2024-12-30 ASSESSMENT — ACTIVITIES OF DAILY LIVING (ADL)
ADLS_ACUITY_SCORE: 67
ADLS_ACUITY_SCORE: 80
ADLS_ACUITY_SCORE: 67
ADLS_ACUITY_SCORE: 70
ADLS_ACUITY_SCORE: 67
ADLS_ACUITY_SCORE: 80
ADLS_ACUITY_SCORE: 70
ADLS_ACUITY_SCORE: 80
ADLS_ACUITY_SCORE: 67
ADLS_ACUITY_SCORE: 80
ADLS_ACUITY_SCORE: 78
ADLS_ACUITY_SCORE: 80
ADLS_ACUITY_SCORE: 80
ADLS_ACUITY_SCORE: 70
ADLS_ACUITY_SCORE: 80
ADLS_ACUITY_SCORE: 82
ADLS_ACUITY_SCORE: 80
ADLS_ACUITY_SCORE: 82
ADLS_ACUITY_SCORE: 80

## 2024-12-30 NOTE — PROGRESS NOTES
Patient calls to ask a question about clotrimazole cream. I advised her to use it nightly for 7 nights.    Margarito Alarcon MD     Received sign out from overnight intensivist      This is an 79 year old man admitted to the ICU for airway management in the setting of a posterior CVA.  The patient underwent angioplasty to the posterior circulation CVA,  The patient was admitted to the ICU for airway protection, ventilatory support, and vent management for and a posterior stroke.      Neuro:   # S/p posterior CVA  # High grade stenosis of proximal-mid basilar artery and the left PCA P1 segment  # S/p IR angioplasty  # Sedation  - Goal -160  - Prn labetalol  - Continue brilinta, no ASA given allergy   - discontinue heparin gtt per neurology  - Neuro Checks q2h.   - Repeat MRI w/o and w contrast pending  - Propofol for sedation, goal RASS 0 to -1  - Atorvastatin 80 mg Q pm    Cardiac:   # Hypotension  - Likely sedation related  - Was briefly on norepi now off     Pulm:    # Acute hypoxic respiratory failure  - Continue on the vent pending repeat MRI brain and neuro assessment  - Vent setting VC//18/5+/30%     GI:   - S/p NGT placement  - Start tube feeds   - Pantoprazole for GI proph     # Diarrhea  - Negative stool pathogen panel  - Immodium PRN  - Hold laxatives    :   # CKD stage 3: stable cr, monitor  - External catheter in place; will bladder scan the patient now to check for retention.  Will place Whitehead catheter if the patient is retaining.     HEME:   - discontinue heparin drip  - Heparin subQ for DVT proph    Endocrine:  - Goal glucose < 180     CODE: Full Code     Critical care time 45 minutes

## 2024-12-30 NOTE — SIGNIFICANT EVENT
"See attestation to Stroke Progress Note from Katrin Malcolm 12/29.    Briefly, patient had progressive worsening of stroke symptoms over the course of the day including dysarthria, dysphagia (difficulty clearing secretions), right-sided ataxia, right hemiparesis, and generalized weakness. Over the past 36 hours he has gone from asymptomatic to unstable. As his symptoms continued to worsen, a stroke code was activated and the endovascular surgical neuroradiology team arrived emergently.  He underwent angioplasty x3 of multifocal severe basilar stenosis/occlusion with excellent radiographic outcome.    Impression:  1. Severe basilar stenosis with progressive stroke symptoms s/p angioplasty x3  2. Ischemic stroke  3. Dysarthria/dysphagia requiring intubation    Recommendations (brief):  1. Q1 neurochecks  2. -160  3. Brilinta 90mg BID     Lars Pappas MD, MS  Vascular Neurology    To page me or covering stroke neurology team member, click here: AMCOM   Choose \"On Call\" tab at top, then search dropdown box for \"Neurology Adult\", select location, press Enter, then look for stroke/neuro ICU/telestroke.    "

## 2024-12-30 NOTE — PROGRESS NOTES
Intubation Note    Date of intubation: 12/29/24  Time of intubation: 2047  Location of intubation: IR3  Intubated by: CRNA  Size of ETT: 8  Location (cm) at lip: 24    ETT placement confirmed by: Color Change and bilateral breath sound  Comments: We will continue to monitor on a ventilator and assess for weaning.    Shawna Olea, RT  12/29/2024

## 2024-12-30 NOTE — PROGRESS NOTES
[facility] ICU RESPIRATORY NOTE        Date of Admission: 12/25/2024     Date of Intubation (most recent): 12/29/2024    Reason for Mechanical Ventilation: Airway protection     Number of Days on Mechanical Ventilation: 2    Met Criteria for Spontaneous Breathing Trial: No    Reason for No Spontaneous Breathing Trial: Per MD     Bite Block: No    Significant Events Today: None     ABG Results:   Recent Labs   Lab 12/30/24  0455   PH 7.37   PCO2 36   PO2 97   HCO3 21   O2PER 30         Current Vent Settings: FiO2 (%): 30 %, Resp: 18, Vent Mode: CMV/AC, Resp Rate (Set): 18 breaths/min, Tidal Volume (Set, mL): 500 mL, PEEP (cm H2O): 5 cmH2O, Resp Rate (Set): 18 breaths/min, Tidal Volume (Set, mL): 500 mL, PEEP (cm H2O): 5 cmH2O    Skin Assessment: Skin intact     Plan: Continue full vent support and wean as tolerated    RT Melania on 12/30/2024 at 5:00 PM

## 2024-12-30 NOTE — PROVIDER NOTIFICATION
MD Notification    Notified Provider: Dr. Gabino CHRISTOPHER    Notified Date/Time: 12/29/24    Notification Interaction: left voicemail; Charge RN called back for me    Purpose of Notification: calling back & updates

## 2024-12-30 NOTE — CONSULTS
"CLINICAL NUTRITION SERVICES  -  ASSESSMENT NOTE      Recommendations Ordered by Registered Dietitian (RD): Pivot 1.5 at 60 mL/hr = 2160 kcal, 135 g protein (1.5 g/kg), 248 g CHO, 11 g fiber, 1080 mL H2O  Total with Propofol + D5 IVF = 2580 kcal (29 kcal/kg), 308 g CHO   Flushes 60 mL every 4 hours    Malnutrition: % Weight Loss:  None noted  % Intake:  Decreased intake does not meet criteria for malnutrition (was eating well prior to intubation)  Subcutaneous Fat Loss:  None observed  Muscle Loss:  None observed  Fluid Retention:  None noted    Malnutrition Diagnosis: Patient does not meet two of the above criteria necessary for diagnosing malnutrition        REASON FOR ASSESSMENT  Elio Aguilar is a 79 year old male seen by Registered Dietitian for Provider Order - Registered Dietitian to Assess and Order TF per Medical Nutrition Therapy Protocol      NUTRITION HISTORY  - Unable to obtain nutrition history as patient intubated and sedated.  - Admitted with the following -->   Ischemic stroke   Intubated 12/29      CURRENT NUTRITION ORDERS  Diet Order:     NPO   Asked to see patient for TF initiation     Current Intake/Tolerance:  N/A    Noted that patient was on a regular diet from 12/25-12/29 and was eating well during that time frame (100% of meals per flowsheets) -- was ordering 2-3 meals per day and each meal with 3-4 items per tray       NUTRITION FOCUSED PHYSICAL ASSESSMENT FOR DIAGNOSING MALNUTRITION)  Yes              Observed:    No nutrition-related physical findings observed    Obtained from Chart/Interdisciplinary Team:  None     ANTHROPOMETRICS  Height: 6' 0\"  Weight: 89.4 kg (197#)(12/30)  Body mass index is 26.73 kg/m   Weight Status:  Overweight BMI 25-29.9  IBW: 80.9 kg   % IBW: 111%  Weight History:   Wt Readings from Last 10 Encounters:   12/30/24 89.4 kg (197 lb 1.5 oz)     Per Care Everywhere -->  200# 10/21/23  198# 8-11/2020    No unintentional weight loss noted     LABS  Labs " reviewed    MEDICATIONS  D5 at 50 mL/hr= 60 g CHO, 204 kcal  Propofol at 8.2 mL/hr= 216 kcal       ASSESSED NUTRITION NEEDS PER APPROVED PRACTICE GUIDELINES:    Dosing Weight 89.4 kg   Estimated Energy Needs: 3226-4294 kcals (25-30 Kcal/Kg)  Justification: maintenance  Estimated Protein Needs: 105-135 grams protein (1.2-1.5 g pro/Kg)  Justification: hypercatabolism with acute illness  Estimated Fluid Needs: 7203-7111 mL (1 mL/Kcal)  Justification: or per MD     MALNUTRITION:  % Weight Loss:  None noted  % Intake:  Decreased intake does not meet criteria for malnutrition (was eating well prior to intubation)  Subcutaneous Fat Loss:  None observed  Muscle Loss:  None observed  Fluid Retention:  None noted    Malnutrition Diagnosis: Patient does not meet two of the above criteria necessary for diagnosing malnutrition    NUTRITION DIAGNOSIS:  Inadequate protein-energy intake related to NPO with plans to start TF today as evidenced by meeting 0% protein and 15% energy needs from IVF + Propofol       NUTRITION INTERVENTIONS  Recommendations / Nutrition Prescription  Pivot 1.5 at 60 mL/hr = 2160 kcal, 135 g protein (1.5 g/kg), 248 g CHO, 11 g fiber, 1080 mL H2O  Total with Propofol + D5 IVF = 2580 kcal (29 kcal/kg), 308 g CHO   Flushes 60 mL every 4 hours     Implementation  Nutrition education: Not appropriate at this time due to patient condition  EN Composition, EN Schedule, Feeding Tube Flush: Orders written to begin TF at 15 mL/hr and increase every 12 hours by 15 mL to goal. Flushes as above.   Collaboration and Referral of Nutrition care: Patient discussed today during interdisciplinary bedside rounds    Nutrition Goals  Goal TF regimen will meet % needs     MONITORING AND EVALUATION:  Progress towards goals will be monitored and evaluated per protocol and Practice Guidelines    Lakisha Ruiz RD, LD, CNSC   Clinical Dietitian - [facility]

## 2024-12-30 NOTE — PLAN OF CARE
After discussion with Dr. Pappas on patient's increased stroke symptoms, RRT/code stroke called. Flying squad transferred patient to CT/IR. Updated family on treatment plan and family phone # given to Dr. Pappas for family update as well. Family escorted to IR waiting room by nursing staff.    FP Registered Nurse Care Course: [id number]  Date of Service: 12/29/24    RN Assessment:  Neuro assessment: Increased stroke s/s assessed yesterday. Upon arrival to shift patient was soft-spoken and told me he felt like phlegm was stuck in his throat/chest. [place]ur was set-up with no secretions which seemed odd. HOB flattened per MD with increased stroke s/s this shift.  Psych: calm, cooperative, family supportive at bedside and waiting room  Aggression Stoplight: green   CV: stable HR; sinus rhythm; BP progressively elevated  Gait/coordination: x-1 assist with gait belt & walker  GI/: imodium given for increased liquid stools with tests pending, LBM 12/29  Diet/nutrition: npo, pureed diet  Infusions: Heparin started this shift & lactated ringer's given for nutrition support  Access: PIV C/D/I; IV dressing changed this shift to maintain integrity and ordered new PIV per IV team  Discussion of Care Mgmt/Discharge Plan: Dr. Pappas recommended intubation for airway protection. RRT/Stroke Team notified. House NP updated/aware. CRNA's updated/aware. IR nursing updated/aware.    _____________________________________________________    Electronically signed by  Ashley Black, RN-BSN  [facility] Nurse

## 2024-12-30 NOTE — PROGRESS NOTES
[facility] ICU RESPIRATORY NOTE        Date of Admission: 12/25/2024    Date of Intubation (most recent): 12/29/2024    Reason for Mechanical Ventilation: A IRWAY PROTECTION    Number of Days on Mechanical Ventilation: 2    Met Criteria for Spontaneous Breathing Trial: No    Reason for No Spontaneous Breathing Trial: per MD    Bite Block: No    Significant Events Today: none    ABG Results:   Recent Labs   Lab 12/30/24  0455   PH 7.37   PCO2 36   PO2 97   HCO3 21   O2PER 30         Current Vent Settings: FiO2 (%): 30 %, Resp: 20, Vent Mode: CMV/AC, Resp Rate (Set): 18 breaths/min, Tidal Volume (Set, mL): 500 mL, PEEP (cm H2O): 5 cmH2O, Resp Rate (Set): 18 breaths/min, Tidal Volume (Set, mL): 500 mL, PEEP (cm H2O): 5 cmH2O    Elvis Winter, RT on 12/30/2024 at 6:00 AM

## 2024-12-30 NOTE — ANESTHESIA PREPROCEDURE EVALUATION
Anesthesia Pre-Procedure Evaluation    Patient: Elio Aguilar   MRN: [id number] : 1945        Procedure : Procedure(s):  Anesthesia out of OR MRI          History reviewed. No pertinent past medical history.   No past surgical history on file.   No Known Allergies   Social History     Tobacco Use    Smoking status: Not on file    Smokeless tobacco: Not on file   Substance Use Topics    Alcohol use: Not on file      Wt Readings from Last 1 Encounters:   24 90.7 kg (200 lb)        Anesthesia Evaluation            ROS/MED HX  ENT/Pulmonary:     (+) sleep apnea,               tobacco use, Past use,                       Neurologic: Comment: Hx Reye Syndrome - resolved; addmitted with symptoms concerning for posterior circulation CVA/TIA, now with plan to neuro IR for thrombectomy    (+)          CVA,                      Cardiovascular: Comment: Hx PFO closure    (+) Dyslipidemia hypertension- -   -  - -                                      METS/Exercise Tolerance:     Hematologic:     (+) History of blood clots,               Musculoskeletal:       GI/Hepatic:     (+) GERD,                   Renal/Genitourinary:     (+) renal disease, type: CRI,            Endo:       Psychiatric/Substance Use:       Infectious Disease:       Malignancy:       Other:               OUTSIDE LABS:  CBC:   Lab Results   Component Value Date    WBC 14.9 (H) 2024    WBC 11.5 (H) 2024    HGB 16.4 2024    HGB 17.0 2024    HCT 46.2 2024    HCT 47.6 2024     2024     2024     BMP:   Lab Results   Component Value Date     2024     2024    POTASSIUM 4.4 2024    POTASSIUM 4.1 2024    CHLORIDE 108 (H) 2024    CHLORIDE 108 (H) 2024    CO2 20 (L) 2024    CO2 20 (L) 2024    BUN 16.6 2024    BUN 14.8 2024    CR 1.41 (H) 2024    CR 1.36 (H) 2024     (H) 2024     (H)  "12/29/2024     COAGS:   Lab Results   Component Value Date    PTT 26 12/25/2024    INR 1.10 12/25/2024     POC: No results found for: \"BGM\", \"HCG\", \"HCGS\"  HEPATIC:   Lab Results   Component Value Date    ALBUMIN 4.0 12/26/2024    PROTTOTAL 6.4 12/26/2024    ALT 22 12/26/2024    AST 23 12/26/2024    ALKPHOS 72 12/26/2024    BILITOTAL 0.5 12/26/2024     OTHER:   Lab Results   Component Value Date    A1C 5.1 12/25/2024    SPENSER 9.3 12/29/2024    TSH 4.94 (H) 12/25/2024    T4 0.93 12/25/2024       Anesthesia Plan    ASA Status:  4, emergent    NPO Status:  NPO Appropriate    Anesthesia Type: General.     - Airway: ETT   Induction: Intravenous, Propofol.   Maintenance: Balanced.        Consents    Anesthesia Plan(s) and associated risks, benefits, and realistic alternatives discussed. Questions answered and patient/representative(s) expressed understanding.     - Discussed:     - Discussed with:  Patient       - Patient is DNR/DNI Status: No          Postoperative Care    Pain management: IV analgesics.   PONV prophylaxis: Background Propofol Infusion, Ondansetron (or other 5HT-3)     Comments:               Jordon Lundy MD    I have reviewed the pertinent notes and labs in the chart from the past 30 days and (re)examined the patient.  Any updates or changes from those notes are reflected in this note.      # Hyperchloremia: Highest Cl = 108 mmol/L in last 2 days, will monitor as appropriate                         # Overweight: Estimated body mass index is 27.12 kg/m  as calculated from the following:    Height as of this encounter: 1.829 m (6').    Weight as of this encounter: 90.7 kg (200 lb).      # Financial/Environmental Concerns: none        "

## 2024-12-30 NOTE — IR NOTE
Interventional Radiology Intra-procedural Nursing Note    Patient Name: Elio Aguilar  Medical Record Number: [id number]  Today's Date: December 29, 2024    Procedure consented for : Diagnostic bilateral carotid cerebral angiogram, arterial access and closure, cerebral mechanical thrombectomy, stent placement and or balloon angioplasty  Start time: 2112    TNK   N/A Bolus time    2112 Puncture Time/first needle stick time.  A 6 Fr arterial  sheath was placed in the right radial artery.    2124 Clot ID time      0 mg  intra-arterial thrombolytic at N/A    2235 Revascularization time    End time: 2300    Report provided to: Lorin MORLEY and Servando MAS RNs  Patient depart time and location:  2318 to ED CT then ICU Room 361          Note: Patient entered Interventional Radiology Suite number three via cart.  Pre-procedural abbreviated neurological assessment completed along with peripheral pulse assessment. Please refer to documentation in Epic flowsheet.  Patient assisted onto procedural table in supine position. Prepped and draped. Dr. Cameron  and Dr Fabian room. Time out and procedure started. Vital signs stable. Telemetry reading NSR with occasional PAC's.    .        Unable to assess neurological status during procedure due to sedation.    Procedure well tolerated by patient without complications. Procedure end with debrief by Dr. Cameron.  Unable to appropriately complete post-procedural neurological assessment due to sedation. Please refer to documentation in Epic flowsheet for peripheral pulse assessment.    Sheath removed at 2254.  TR band applied at 2254, and 11 ml of air deployed.  Kerlix and gauze dressing applied to right radial artery TR band site.      Administered medication totals:  Lidocaine 1% 2 mL Intradermal  Versed 4 mg IVP  Fentanyl 200 mcg IVP  Nitroglycerin 200 mcg IA  Verapamil 2.5 mg IA    Last dose of sedation administered at 2249.    Post-procedure Education  The following information has been  reviewed with the family:    1. Maintain bedrest 2 hours  2. Notify nurse immediately if having any bleeding from site, any changes in sensation, or changes in strength.  3. Notify nurse if you have to go to bathroom, so staff will assist you.   4. Nurses will be doing frequent checks afterwards, including your foot or arm pulses, vitals, groin site and neuro exam.   5. Press your call light if you have any questions.    Learner's Response to Angiogram Education: patient not ready due to sedation

## 2024-12-30 NOTE — PROGRESS NOTES
[facility]    Vascular Neurology Progress Note    Interval History     Temp: Afebrile  SBP range: 122-161  Heart rate: WNL  Wbc: 14.9>11.5  Sodium: 141>141  Blood glucose: 69  Intubated  Sedated: Propofol 55 mcg/kg/min  Levophed stopped last night    Acute events overnight: lactate elevated, new fever.     Exam today: minimal following commands (see below). Not ready for extubation at this time. Plan to obtain MRI first.      Hospital Course     Elio Aguilar is a 79 year old male with pertinent past medical history of a prior left corona radiata stroke in 2010 (no significant ICAD at that time), found to have PFO s/p closure at [place] per patient.  History of HTN, HLD, history of adult onset Reyes syndrome, DM2, does not see a doctor regularly.  Not on PTA ASA.    He presented to the emergency department 12/25/2024 with lightheadedness, diaphoresis, dysarthria x 5 minutes.  CTA showed severe basilar stenosis/occlusion beginning at V4/basilar junction.  No clear focal weakness or facial droop.  He was initially started on DAPT and then transition to heparin drip + ASA due to recurrent transient symptoms.  Initial MRI 12/26 negative for acute pathology.  Repeat CTA 12/27 showed some improvement in basilar narrowing.  Had new dysarthria so repeat MRI performed 12/28 and showed probable small pontine infarct.  MRA unchanged.    12/29 stroke code activated for progressive worsening of symptoms, dysarthria, hypophonia as well as new right arm/leg ataxia and right hemiparesis.  CT/CTA unchanged and underwent emergent diagnostic cerebral angiogram s/p angioplasty x 3 for severe multifocal basilar artery stenosis (required intubation due to difficulty managing secretions).  Anticoagulation/ASA stopped and started on Brilinta postprocedure.    Assessment and Plan     # Ischemic stroke 2/2 basilar stenosis/occlusion secondary to large artery atherosclerosis s/p Balloon Angioplasty x3 of the  multifocal proximal to middle basilar artery TICI 3 recanalization   #Severe bulbar weakness, dysphagia 2/2 above s/p intubation  -Neuro checks and vitals every 1 hour  -Anticoagulation was stopped, continue Brilinta 90 mg twice daily, started cilostazol 100 mg twice daily for dual antiplatelet therapy after discussion with neuro IR today  -Pt not on aspirin currently due to history of adult onset Reyes syndrome   -repeat MRI brain w/wo contrast  -later this week will plan to obtain follow-up CTA head/neck to assess for basilar restenosis   -telemetry  -PT/OT/SPT, appreciate dietician recommendations on NGT/tube feeds  -Appreciate ventilator management per intensivist team, hold off on extubation pending results of repeat MRI  -Euthermia, euglycemia (BG <180), eunatremia  -Stroke Education, education provided on BEFAST stroke symptoms     #New fever and lactic acidosis  -appreciate intensivist evaluation and management    #Hypertension  -appreciate management per primary team  -Inpatient SBP goal 120-160  -Outpatient BP goal <140/90, with tighter control if tolerated  -Recommend home blood pressure monitoring twice daily in AM and PM, keep log and bring to medical visits    #Hyperlipidemia, above goal  -Atorvastatin 80 mg daily  -Recommend LDL goal 40-70, <40 increases risk of intracranial hemorrhage  -Recommend Mediterranean diet     #Diabetes mellitus type 2  -appreciate management per primary team  -blood glucose monitoring, long term A1c goal <7%      Discussed with vascular neurology attending, Dr. Forte    DVT Prophylaxis: okay with pharmacologic VTE ppx from NCC perspective    Patient Follow-up    - final recommendation pending work-up  -PM&R in [place]   -Follow-up in [place] at [facility] with Dr. Wan Rowan (referral made) in 6-8 weeks     We will continue to follow.       Kandis Betancourt PA-C  Vascular Neurology    To page me or covering stroke neurology team member, click  "here: [facility]  Choose \"On Call\" tab at top, then select \"NEUROLOGY/ALL SITES\" from middle drop-down box, press Enter, then look for \"stroke\" or \"telestroke\" for your site.    Physical Examination     Temp: 99.1  F (37.3  C) Temp src: Axillary BP: (!) 152/83 Pulse: 64   Resp: 18 SpO2: 100 % O2 Device: Mechanical Ventilator      Neurologic  Mental Status:  not alert but arousable with mild stimulation to attend, no verbal response with ETT, follows very minimal commands (sedation held for exam)  Cranial Nerves:  blinks to threat bilaterally, Left gaze preference not able to cross midline, corneal and cough reflex intact, RN reported that pupils reactive with pupillometer, face grossly symmetric with ETT  Motor: no effort against gravity to any extremities, does follow commands to grasp with with the right hand, some withdrawal to LUE and bilateral lower extremities, follows commands to wiggle bilateral toes  Reflexes:   Deferred  Sensory:  see motor  Coordination:  not able to obtain due to AMS  Station/Gait:  deferred      Imaging/Labs   (Bolded imaging and labs new and/or personally reviewed or re-reviewed by me today)    MRI and/or Head CT MRI brain 12/30: pending  MRI 12/28: Indeterminate diffusion hyperintensity in the ventral armin, without a definite T2 or ADC correlate. Findings may reflect evolving ischemic change or artifact from the adjacent skull base.   Initial MRI: No acute stroke   Intracranial Vasculature MRA 12/28: Unchanged     CTA 12/27: Formally read as stable from prior, however feel there is some improvement in basilar stenosis on stroke team review.     CTA: Multifocal high-grade stenosis/near occlusion of the proximal/mid basilar, bilateral PCA stenosis, moderate left M1 stenosis, moderate supraclinoid right ICA stenosis     MRA: Absent flow related contrast at the basilar artery, multifocal iCAD   Cervical Vasculature CTA: 40 to 50% right cervical ICA stenosis      Echocardiogram EF 60 to 65%, no " regional wall motion abnormalities, normal left atrial size   EKG/Telemetry SR   Other Testing P2 Y12: 7    Diagnostic cerebral angiogram:  successful Balloon Angioplasty x3 of the multifocal proximal to middle basilar artery stenosis TICI 3 recanalization. With residual stenosis measuring per WASID criteria. pending formal report       LDL  12/25/2024: 131 mg/dL   A1C  12/25/2024: 5.1 %   Troponin No lab value available in past 48 hrs     Other labs reviewed by me today:  aPTT, CBC, CMP, magnesium, phosphorus, ionized calcium, lactic acid, INR    Time Spent on this Encounter   Billing: I personally examined and evaluated the patient today. At the time of my evaluation and management the patient was in critical condition today due to stroke s/p balloon angioplasty and intubation. I personally managed review of chart, medical record, meds, imaging, history, exam, and discussion with attending regarding plan and documentation. I spent a total of 50 minutes providing critical care services, evaluating the patient, directing care and reviewing laboratory values and radiologic reports.     *All or a portion of this note was generated using voice recognition software and may contain transcription errors.

## 2024-12-30 NOTE — PLAN OF CARE
SLP - Given pt intubation, will hold SLP swallow eval/Tx and VFSS.  Plan to check pt status over the next 2-3 days.

## 2024-12-30 NOTE — CODE/RAPID RESPONSE
Code stroke documentation    Brief code stroke note:    I presented to patient's bedside after CODE STROKE was paged out by RN per Dr. Pappas's request. Patient had been having increasing difficulty with secretions and worsening ataxia of right side. Patient's airway was clear and without stridor or respiratory distress. Patient endorsed being unable to cough, clear his throat, or take deep breaths. Patient was able to lie flat without hypoxia or distress so he was sent for CT/CTA without intubation. I discussed this with Dr. Pappas. The plan is to go to IR for possible thrombectomy if there is no hemorrhage but patient will need intubation for the procedure and due to his inability to protect his airway, he will remain intubated in the ICU following the procedure. This plan was arranged with ICU RNs and CRNA staff to expedite the process. Discussed with intensivist and consult placed for ventilator management.    FLORY Cornell, CNP  Hospitalist - [facility]  Text me on the [employer] jarocho for a textback

## 2024-12-30 NOTE — PROGRESS NOTES
[facility]    Medicine Progress Note - Hospitalist Service    Date of Admission:  12/25/2024    Assessment & Plan   Elio Aguilar is a 79 year old male with history of HTN, HLD, ANGEL, Reye's Syndrome who was admitted on 12/25/2024 with multifocal ICAD     High-grade stenosis of proximal-mid basilar artery with progressive stroke symptoms s/p angioplasty x3 (12/29)  High grade stenosis of L PCA P1 segment  ICAD involving basilar artery, L PCA, R PCA, L MCA, supraclinoid R ICA   Hx of L posterior corona radiata CVA (2010)  Hx of PFO s/p closure (2010, [place])  Hx of tobacco use  Dysarthria/dysphagia requiring intubation 12/29  Patient with history of CVA in setting of PFO presenting with acute onset dizziness and speech changes found to have high-grade basilar artery and left PCA P1 segment stenosis without acute hemorrhage or ischemic infarct on CT.  Symptoms quickly resolved; stroke neurology involved in the ED and patient was loaded with aspirin and Plavix.  Remains hemodynamically stable with no ongoing deficits on exam. In setting of prior CVA and suspected TIA, patient will need aggressive risk factor modification.   *12/25 MRI head: normal for age  *12/25 MRA Head: absent flow at basilar artery, likely severe stenosis less likely occlusion. Additional multifocal atherosclerosis including moderate stenosis of R MCA.  *12/26 recurrent symptoms ~1h after sitting upright, resolved while laying flat  *12/26 Echo: EF 60-65%, RV normal. IVC normal. Same as 2010 study  *12/27 CTA: some improvement in flow in basilar artery  *12/28 discharge cancelled due to slurring voice. ASA switched to brilinta BID  *12/28 MRI/MRA: indeterminate hyperintensity in ventral armin, ?evolving ischemic change vs artifact. Mild volume loss and chronic microvascular ischemic disease. Unchanged high grade stenoses/loss of flow at basilar artery. Unchanged stenosis left M1 segment. Nonflow limiting plaque at R carotid  bifurcation.  *12/29: worsening dysarthria with swallow effected, intubated due to concern for inability to manage secretions- taken by neuro IR for balloon angioplasty x3 to multifocal prox-middle basilar artery with residual stenosis  *A2P40=6  -SBP goal 120-160   - atorvastatin 80mg daily  -Brilinta 90mg BID   -Free one month voucher for brilinta from pharmacy, then 107$ per month   - eliquis switched back to heparin gtt on 12/29  -q1h neuro checks  **remains intubated 12/30  -Stroke Neurology following, recommends repeat CT imaging in 6-8 weeks with neurology follow-up  - follow up with PCP with daily BP checks upon return to [place], CBC/CMP in 2 weeks  -Outpatient sleep medicine referral on discharge     CKD Stage 3a  Prior creatinine levels around 1.3 (in 2010), creatinine 1.44 on admission. Cr remains stable 1.3-1.5 this admit.    Rhinorrhea, loose stool, shortness of breath  Sudden onset of symptoms 12/29 AM  - SOB could be secondary to difficulty swallowing/lying on his back with drainage/progressive CVA vs side effect of brilinta  - COVID/Flu/RSV all negative. CXR clear  - frequent loose stools, enteric panel negative for norovirus. ? Diarrhea side effect from atorvastatin   - BID imodium PRN    Acid reflux  - protonix IV daily added 12/29     HTN, suspected - No PTA medications. Allowing moderate HTN as above.  HLD - Total Cholesterol 222, , HDL 38, trigs 265.  ANGEL - No PTA CPAP use. Sleep Medicine referral on discharge.  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 Reportedly comatose for unknown time with full recovery.     Elevated TSH  T4 free WNL  - check TSH in 4-5 weeks with PCP          Diet: Diet  NPO for Medical/Clinical Reasons Except for: Meds  Adult Formula Drip Feeding: Continuous Pivot 1.5; Other - Specify in Comment; Goal Rate: 60; mL/hr; Begin TF at 15 mL/hr and increase every 12 hours by 15 mL to goal; Do not advance tube feeding rate unless  K+ is = or > 3.0, Mg++ is = or > 1.5, an...    DVT Prophylaxis: DOAC  Whitehead Catheter: PRESENT, indication: Acute retention or obstruction  Lines: None     Cardiac Monitoring: ACTIVE order. Indication: ICU  Code Status: Full Code      Clinically Significant Risk Factors          # Hyperchloremia: Highest Cl = 108 mmol/L in last 2 days, will monitor as appropriate      # Hypocalcemia: Lowest Ca = 8.5 mg/dL in last 2 days, will monitor and replace as appropriate      # Coagulation Defect: INR = 1.26 (Ref range: 0.85 - 1.15) and/or PTT = 22 Seconds (Ref range: 22 - 38 Seconds), will monitor for bleeding               # Overweight: Estimated body mass index is 26.73 kg/m  as calculated from the following:    Height as of this encounter: 1.829 m (6').    Weight as of this encounter: 89.4 kg (197 lb 1.5 oz).        # Financial/Environmental Concerns: none         Social Drivers of Health    Food Insecurity: Unknown (12/30/2024)    Food Insecurity     Within the past 12 months, did you worry that your food would run out before you got money to buy more?: Patient unable to answer     Within the past 12 months, did the food you bought just not last and you didn t have money to get more?: Patient unable to answer   Housing Stability: Unknown (12/30/2024)    Housing Stability     Do you have housing? : Patient unable to answer     Are you worried about losing your housing?: Patient unable to answer   Financial Resource Strain: Unknown (12/30/2024)    Financial Resource Strain     Within the past 12 months, have you or your family members you live with been unable to get utilities (heat, electricity) when it was really needed?: Patient unable to answer   Transportation Needs: Unknown (12/30/2024)    Transportation Needs     Within the past 12 months, has lack of transportation kept you from medical appointments, getting your medicines, non-medical meetings or appointments, work, or from getting things that you need?: Patient  unable to answer   Interpersonal Safety: Unknown (12/30/2024)    Interpersonal Safety     Do you feel physically and emotionally safe where you currently live?: Patient unable to answer     Within the past 12 months, have you been hit, slapped, kicked or otherwise physically hurt by someone?: Patient unable to answer     Within the past 12 months, have you been humiliated or emotionally abused in other ways by your partner or ex-partner?: Patient unable to answer          Disposition Plan     Medically Ready for Discharge: Anticipated in 2-4 Days  Pending extubation           Katie Bay DO  Hospitalist Service  [facility]  Securely message with [employer] (more info)  Text page via [employer] Paging/Directory   ______________________________________________________________________    Interval History   Patient seen, remains intubated. Discussed with RN--will remain on ventilator today hopeful for extubation on 12/31    Physical Exam   Vital Signs: Temp: 99.3  F (37.4  C) Temp src: Axillary BP: (!) 162/72 Pulse: 70   Resp: 18 SpO2: 99 % O2 Device: Mechanical Ventilator    Weight: 197 lbs 1.46 oz    Remains intubated--not examined this day    Medical Decision Making       30 MINUTES SPENT BY ME on the date of service doing chart review, history, exam, documentation & further activities per the note.      Data     I have personally reviewed the following data over the past 24 hrs:    11.5 (H)  \   16.6   / 136 (L)     141 106 16.1 /  90   4.1 22 1.43 (H) \     ALT: 104 (H) AST: 75 (H) AP: 68 TBILI: 0.6   ALB: 3.9 TOT PROTEIN: 6.3 (L) LIPASE: N/A     Procal: N/A CRP: N/A Lactic Acid: 3.3 (H)       INR:  1.26 (H) PTT:  22   D-dimer:  N/A Fibrinogen:  N/A       Imaging results reviewed over the past 24 hrs:   Recent Results (from the past 24 hours)   XR Chest Port 1 View    Narrative    EXAM: XR CHEST PORT 1 VIEW  LOCATION: [facility]  DATE: 12/29/2024    INDICATION: Chest  congestion.  COMPARISON: None available.      Impression    IMPRESSION: No focal airspace consolidation. No pleural effusion or pneumothorax.    Heart size is normal. Atherosclerotic calcifications of the thoracic aorta.   CT Head w/o Contrast    Narrative    EXAM: CT HEAD W/O CONTRAST  LOCATION: [facility]  DATE: 12/29/2024    INDICATION: Transient ischemic attack.  COMPARISON: MRI 12/25/2024.  TECHNIQUE: Routine CT Head without IV contrast. Multiplanar reformats. Dose reduction techniques were used.    FINDINGS:  INTRACRANIAL CONTENTS: No intracranial hemorrhage, extraaxial collection, or mass effect.  No CT evidence of acute infarct. Mild presumed chronic small vessel ischemic changes. Mild generalized volume loss. No hydrocephalus.     VISUALIZED ORBITS/SINUSES/MASTOIDS: No intraorbital abnormality. No paranasal sinus mucosal disease. No middle ear or mastoid effusion.    BONES/SOFT TISSUES: No acute abnormality.      Impression    IMPRESSION:  1.  No CT evidence for acute intracranial process.  2.  Mild chronic microvascular ischemic changes as above.   CTA Head Neck w Contrast    Addendum: 12/29/2024    COMMUNICATION ADDENDUM:  Findings were discussed with Dr. Pappas on 12/29/2024 8:52 PM CST.    END ADDENDUM      Narrative    EXAM: CTA HEAD NECK W CONTRAST  LOCATION: [facility]  DATE: 12/29/2024    INDICATION: Eval basilar occlusion; Headache; r o Subarachnoid hemorrhage; None of the following: Abnormal neuro exam, high clinical suspicion, negative CT head > 6 hours from onset of symptoms, or positive CT head result  COMPARISON: CT at 12/25/2024, MRI 12/25/2024  CONTRAST: 67mL Isovue 370  TECHNIQUE: Head and neck CT angiogram with IV contrast. Noncontrast head CT followed by axial helical CT images of the head and neck vessels obtained during the arterial phase of intravenous contrast administration. Axial 2D reconstructed images and   multiplanar 3D MIP  reconstructed images of the head and neck vessels were performed by the technologist. Dose reduction techniques were used. All stenosis measurements made according to NASCET criteria unless otherwise specified.    FINDINGS:   HEAD CTA:  ANTERIOR CIRCULATION: Unchanged moderate stenosis of left M1 mid segment. Similar moderate stenosis right supraclinoid ICA. No stenosis/occlusion, aneurysm, or high flow vascular malformation. Fetal origin of the left posterior cerebral artery from the   anterior circulation. Small right A1 segment.    POSTERIOR CIRCULATION: Unchanged multifocal high-grade stenosis/flow gaps in the proximal and mid basilar artery with normal appearing distal basilar artery, overall appearance similar to prior CTA and MRA of 12/25/2024. No aneurysm, or high flow   vascular malformation. Dominant right vertebral artery supplies the basilar artery with a small left vertebral artery supplying the left posterior inferior cerebellar artery (PICA).     DURAL VENOUS SINUSES: Expected enhancement of the major dural venous sinuses.    NECK CTA:  RIGHT CAROTID: Moderate plaque disease. Atherosclerotic plaque results in approximately 50% stenosis in the right proximal ICA. No dissection.    LEFT CAROTID: Mild plaque disease. No measurable stenosis or dissection.    VERTEBRAL ARTERIES: No focal stenosis or dissection. Dominant right and smaller left vertebral arteries.    AORTIC ARCH: Classic aortic arch anatomy with no significant stenosis at the origin of the great vessels.    NONVASCULAR STRUCTURES: At C5-6, large left paracentral disc osteophyte complex with moderate spinal stenosis and severe left foraminal stenosis      Impression    IMPRESSION:   HEAD CTA:   1.  Similar multifocal high-grade stenosis/flow gaps occipital and mid segment of basilar artery, appearance unchanged from CTA of 12/25/2024.  2.  Similar moderate stenosis left M1 and right supraclinoid ICA.    NECK CTA:  1.  Right proximal ICA  demonstrates approximately 50% stenosis, similar to prior.  2.  No additional significant stenosis.  3.  No dissection.   CT Head w/o Contrast    Narrative    EXAM: CT HEAD W/O CONTRAST  LOCATION: [facility]  DATE: 12/29/2024    INDICATION: dual energy head CT  COMPARISON:  CT head December 29, 2024. MRI brain December 28, 2024.  TECHNIQUE: Routine CT Head without IV contrast. Multiplanar reformats. Dose reduction techniques were used.    FINDINGS:  INTRACRANIAL CONTENTS: Prominence of the dura and intracranial arteries on the basis of recent contrast administration. No intracranial hemorrhage, extraaxial collection, or mass effect.  Evolving low-density pontine infarct.  Normal parenchymal   attenuation. Normal ventricles and sulci.     VISUALIZED ORBITS/SINUSES/MASTOIDS: Prior bilateral cataract surgery. Visualized portions of the orbits are otherwise unremarkable. No paranasal sinus mucosal disease. No middle ear or mastoid effusion.    BONES/SOFT TISSUES: No acute abnormality.      Impression    IMPRESSION:  1.  Evolving low-density pontine infarct. No hemorrhagic transformation or significant edema.    XR Chest Port 1 View    Narrative    EXAM: XR CHEST PORT 1 VIEW  LOCATION: [facility]  DATE: 12/30/2024    INDICATION: ET tube placed; check ET position.  Patient with acute posterior stroke.  COMPARISON: 12/29/2024      Impression    IMPRESSION: Endotracheal tube 2.7 cm above sonali. Stable cardiomediastinal silhouette. No focal consolidation or pleural effusion. Calcified granuloma left upper lobe.               XR Abdomen Port 1 View    Narrative    XR ABDOMEN PORT 1 VIEW   12/30/2024 12:26 PM     HISTORY: feeding tube placement    COMPARISON: None.      Impression    IMPRESSION: Enteric tube has been placed with tip and side-port in the  stomach.    FRANCESCA DELCID MD         SYSTEM ID:  [id number]

## 2024-12-30 NOTE — PROGRESS NOTES
Endovascular Surgical Neuroradiology - Progress Note    78yo M with h/o HTN, HLD, ANGEL, had progressively worsening stroke symptoms including dysarthria, dysphagia, ataxia, right hemiparesis, and was taken for emergent balloon angioplasty for basilar artery multifocal stenosis on 12/29/24. He underwent successful balloon angioplasty 3x of the prox to middle basilar artery with TICI 3 recanalization.  Post procedure, he was found to require vent support and was intubated and admitted to ICU for airway protection.  He is currently on ticagrelor 90 bid.    Vitals:    12/30/24 0600 12/30/24 0615 12/30/24 0630 12/30/24 0645   BP: (!) 149/76 131/67 (!) 144/76 (!) 149/80   Cuff Size:       Pulse: 67 66 67 67   Resp:       Temp:       TempSrc:       SpO2: 99% 99% 97% 99%   Weight:       Height:          Labs reviewed-  BUN 16, Cr 1.43, WBC 11.5, Hb 16.6, INR 1.26    Exam- sedated with propofol  Not alert or oriented. Does not follow commands  Intubated  CN- pupils pinpoint but equal, round and reactive to light, oculocephalics neg, corneal absent on the right, intact cough and gag  Motor and sensory- withdraws to noxious stimuli in all four extremities  Coordination- deferred    Assessment/ Plan:  78yo M with h/o HTN, HLD, ANGEL, had progressively worsening stroke symptoms including dysarthria, dysphagia, ataxia, right hemiparesis, and was taken for emergent balloon angioplasty for basilar artery multifocal stenosis on 12/29/24.   - continue brillinta 90 bid. Pt not on aspirin currently due to history of adult onset Reyes syndrome  - rest of the care per ICU/ stroke team.    Patient discussed with Dr. Cameron.  Florence Holloway MD  Fellow, Endovascular Surgical Neuroradiology

## 2024-12-30 NOTE — ANESTHESIA PROCEDURE NOTES
Airway       Patient location during procedure: OR       Procedure Start/Stop Times: 12/29/2024 8:47 PM  Staff -        Anesthesiologist:  Jordon Lundy MD       CRNA: Eliana Dsouza       Performed By: CRNA  Consent for Airway        Urgency: elective  Indications and Patient Condition       Indications for airway management: rose-procedural       Induction type:intravenous       Mask difficulty assessment: 0 - not attempted    Final Airway Details       Final airway type: endotracheal airway       Successful airway: ETT - single, Oral and Single subglottic suction  Endotracheal Airway Details        ETT size (mm): 8.0       Cuffed: yes       Successful intubation technique: video laryngoscopy       VL Blade Size: Reddy 4       Grade View of Cords: 1       Adjucts: stylet       Position: Right       Measured from: gums/teeth       Secured at (cm): 23       Bite block used: None    Post intubation assessment        Placement verified by: capnometry, equal breath sounds and chest rise        Number of attempts at approach: 1       Number of other approaches attempted: 0       Secured with: tape       Ease of procedure: easy       Dentition: Unchanged and Intact    Medication(s) Administered   Medication Administration Time: 12/29/2024 8:47 PM

## 2024-12-30 NOTE — PROCEDURES
[facility]     Endovascular Surgical Neuroradiology Post-Procedure Note    Pre-Procedure Diagnosis: Basilar artery multifocal stenosis  Post-Procedure Diagnosis: same    Procedure:   Emergency balloon angioplasty procedure    Reason for delay:  Not applicable    Findings: Successful Balloon Angioplasty three times of the multifocal proximal to middle basilar artery TICI 3 recanalization. With residual stenosis measuring per WASID criteria    Plan:    -TR band deflation in 2 hours  -BP goals floor of 120 to roof of 160  -Discontinue heparin  -Only continue on Brilinta for now    Follow-up imaging: CT head dual energy STAT  Blood pressure goal: Floor of 120 and roof of 160  Antithrombotic plan: Discontinue heparin, for now just continue heparin  Neuro-check frequency: Q 1 hour    Primary Surgeon: Dr. Joseph Cameron  Secondary Surgeon: Not applicable  Secondary Surgeon Review: None  Fellow: Pam  Additional Assistants: none    Prior to the start of the procedure and with procedural staff participation, I verbally confirmed: the patient s identity using two indicators, relevant allergies, that the procedure was appropriate and matched the consent or emergent situation, and that the correct equipment/implants were available. Immediately prior to starting the procedure I conducted the Time Out with the procedural staff and re-confirmed the patient s name, procedure, and site/side. (The Joint Commission universal protocol was followed.)  Yes    PRU value: Not applicable    Anesthesia: Deep sedation with intubation  Medications: Versed and Fentanyl  Puncture site: Right Radial Artery    Fluoroscopy time (minutes): 40.4  Radiation dose (mGy): 941.11  Contrast amount (mL): 50    Estimated blood loss (mL): 20    Closure: TR band closure        Disposition: Will be followed in hospital by the Neuro Critical Care/Stroke team.        Sedation Post-Procedure Summary    The patient was reevaluated  immediately before administering moderate or deep sedation or anesthesia.    Sedatives: Versed and Fentanyl    Vital signs and pulse oximetry were monitored and remained stable throughout the procedure, and sedation was maintained until the procedure was complete.  The patient was monitored by staff until sedation discharge criteria were met.    Patient tolerance: Patient tolerated the procedure well with no immediate complications.    Time of sedation in minutes:  minutes from beginning to end of physician one to one monitoring.    Kimberlyn Orozco MD    Use [employer] to contact: Neuro IR Fellow ([place])  Fellow's personal cell phone number provided to bedside RN.

## 2024-12-30 NOTE — PROGRESS NOTES
[facility]     Endovascular Surgical Neuroradiology Pre-Procedure Note      HPI:  Elio Aguilar is a 79 year old male with worsening neurological exam secondary to basilar artery stenosis. He is here for diagnostic cerebral angiogram with intention for mechanical thrombectomy, stenting and/or angioplasty. He is intubated prior to procedure due to worsening respiratory secretions for airway protection.    Medical History:  Reviewed    Surgical History:  Reviewed    Family History:  Reviewed    Social History:  Reviewed    Allergies:  Reviewed    Is there a contrast allergy?  No    Medications:  I have reviewed this patient's current medications.    ROS:  Review of systems not obtained due to patient factors - intubation    PHYSICAL EXAMINATION  Vital Signs:  B/P: 138/77,  T: 96.8,  P: 64,  R: 16    Cardio:  RRR  Pulmonary:  no respiratory distress  Abdomen:  soft, non-tender, non-distended    Neurologic  Mental Status:  fully alert, attentive and oriented  Cranial Nerves:  visual fields intact, PERRL, EOMI with normal smooth pursuit  Motor:  right upper and lower extremity weakness  Sensory:  intact  Coordination:  Right sided ataxia    Pre-procedure National Institutes of Health Stroke Scale:   Patient intubated    LABS  (most recent Cr, BUN, GFR, PLT, INR, PTT within the past 7 days):  Recent Labs   Lab 12/29/24  1800 12/29/24  1056 12/26/24  0527 12/25/24  1240   CR  --  1.41*   < > 1.44*   BUN  --  16.6   < > 14.9   GFRESTIMATED  --  50*   < > 49*    189   < > 184   INR  --   --   --  1.10   PTT  --   --   --  26    < > = values in this interval not displayed.        Platelet Function P2Y12 (PRU):  Not applicable      ASSESSMENT:    PLAN:        PRE-PROCEDURE SEDATION ASSESSMENT     Pre-Procedure Sedation Assessment done at 2055    Expected Level:  Deep Sedation    Indication:  Sedation is required to allow for neurointerventional procedure.    Consent obtained from spouse  after discussing the risks, benefits and alternatives.     PO Intake:  Not NPO but emergent condition outweighs risk.    ASA Class:  Class 2 - MILD SYSTEMIC DISEASE, NO ACUTE PROBLEMS, NO FUNCTIONAL LIMITATIONS.    Mallampati:  Grade 2:  Soft palate, base of uvula, tonsillar pillars, and portion of posterior pharyngeal wall visible    History and physical reviewed and no updates needed. I have reviewed the lab findings, diagnostic data, medications, and the plan for sedation. I have determined this patient to be an appropriate candidate for the planned sedation and procedure and have reassessed the patient IMMEDIATELY PRIOR to sedation and procedure.    Patient was discussed with the Attending, Dr. Cameron, who agrees with the plan.    Kimberlyn Orozco MD   Pager: [phone]

## 2024-12-30 NOTE — PLAN OF CARE
Neuro: Pt sedated on Prop. PERRLA. Using pupillometer. Not following commands. Pulses palpable and withdraws from all extremities. Low grade temp.   CV: Maintained SBP goal 120-160 and MAP >65. Tele SB to SR. HR 50-70's. Levo gtt off prior to ICU.   Resp: Continues on full vent support. FiO2 30%. PEEP 5. LS diminished. ETT and oral secretions blood-tinged.   Endocrine: BG 68 and 91. Dextrose 50ml given x1. Recheck  and 107.   GI/: NPO. Two small loose BM's. Hypoactive BS. Bladder scanned x2 >500cc. [place] placed.  Skin: R radial surgical site. TR band removed. Hemostasis achieved.   Lines/Gtts: PIV x2. Prop infusing.   POC: Wean sedation  Other: Lactated elevated, LR 250cc given x1.     Goal Outcome Evaluation:      Plan of Care Reviewed With: patient    Overall Patient Progress: no changeOverall Patient Progress: no change    Problem: Stroke, Ischemic (Includes Transient Ischemic Attack)  Goal: Optimal Cerebral Tissue Perfusion  Outcome: Progressing  Intervention: Protect and Optimize Cerebral Perfusion  Recent Flowsheet Documentation  Taken 12/30/2024 0700 by Adriana Lyn RN  Sensory Stimulation Regulation:   auditory stimulation minimized   care clustered   quiet environment promoted   tactile stimulation minimized   visual stimulation minimized  Taken 12/30/2024 0400 by Adriana Lyn RN  Sensory Stimulation Regulation:   auditory stimulation minimized   care clustered   quiet environment promoted   tactile stimulation minimized   visual stimulation minimized  Cerebral Perfusion Promotion:   blood pressure monitored   normothermia promoted   sedation level increased   sedation level decreased  Taken 12/29/2024 2345 by Adriana Lyn RN  Sensory Stimulation Regulation:   auditory stimulation minimized   care clustered   quiet environment promoted   tactile stimulation minimized   visual stimulation minimized  Cerebral Perfusion Promotion:   blood pressure monitored    normothermia promoted   sedation level increased   sedation level decreased  Goal: Effective Oxygenation and Ventilation  Outcome: Progressing  Intervention: Optimize Oxygenation and Ventilation  Recent Flowsheet Documentation  Taken 12/30/2024 0600 by Adriana Lyn RN  Head of Bed ([facility]) Positioning: HOB at 30 degrees  Taken 12/30/2024 0400 by Adriana Lyn RN  Airway/Ventilation Management:   airway patency maintained   calming measures promoted   humidification applied   oxygen therapy provided   position adjusted   positive pressure ventilation provided   pulmonary hygiene promoted  Head of Bed (HOB) Positioning: HOB at 30 degrees  Taken 12/30/2024 0200 by Adriana Lyn RN  Head of Bed (HOB) Positioning: HOB at 30 degrees  Taken 12/30/2024 0000 by Adriana Lyn RN  Airway/Ventilation Management:   airway patency maintained   calming measures promoted   humidification applied   oxygen therapy provided   position adjusted   positive pressure ventilation provided   pulmonary hygiene promoted  Head of Bed (HOB) Positioning: HOB at 30 degrees  Taken 12/29/2024 2345 by Adriana Lyn RN  Airway/Ventilation Management:   airway patency maintained   calming measures promoted   humidification applied   oxygen therapy provided   position adjusted   positive pressure ventilation provided   pulmonary hygiene promoted     Problem: Mechanical Ventilation Invasive  Goal: Optimal Device Function  Outcome: Progressing  Intervention: Optimize Device Care and Function  Recent Flowsheet Documentation  Taken 12/30/2024 0600 by Adriana Lyn RN  Oral Care:   lip/mouth moisturizer applied   suction provided   swabbed with antiseptic solution  Taken 12/30/2024 0400 by Adriana Lyn RN  Airway/Ventilation Management:   airway patency maintained   calming measures promoted   humidification applied   oxygen therapy provided   position adjusted   positive pressure  ventilation provided   pulmonary hygiene promoted  Oral Care:   lip/mouth moisturizer applied   suction provided   swabbed with antiseptic solution  Taken 12/30/2024 0200 by Adriana Lyn RN  Oral Care:   lip/mouth moisturizer applied   suction provided   swabbed with antiseptic solution  Taken 12/30/2024 0000 by Adriana Lyn RN  Airway/Ventilation Management:   airway patency maintained   calming measures promoted   humidification applied   oxygen therapy provided   position adjusted   positive pressure ventilation provided   pulmonary hygiene promoted  Oral Care:   lip/mouth moisturizer applied   suction provided   swabbed with antiseptic solution  Taken 12/29/2024 2345 by Adriana Lyn RN  Airway/Ventilation Management:   airway patency maintained   calming measures promoted   humidification applied   oxygen therapy provided   position adjusted   positive pressure ventilation provided   pulmonary hygiene promoted  Goal: Absence of Ventilator-Induced Lung Injury  Outcome: Progressing  Intervention: Facilitate Lung-Protection Measures  Recent Flowsheet Documentation  Taken 12/30/2024 0400 by Adriana Lyn RN  Lung Protection Measures:   fluid excess minimized   lung compliance monitored   optimal PEEP applied   ventilator synchrony promoted   ventilator waveforms monitored  Taken 12/30/2024 0000 by Adriana Lyn RN  Lung Protection Measures:   fluid excess minimized   lung compliance monitored   optimal PEEP applied   plateau/inspiratory pressure monitored   ventilator synchrony promoted   ventilator waveforms monitored   low tidal volume provided   low inspiratory pressure provided  Taken 12/29/2024 2345 by Adriana Lyn RN  Lung Protection Measures:   fluid excess minimized   low inspiratory pressure provided   low tidal volume provided   lung compliance monitored   optimal PEEP applied   plateau/inspiratory pressure monitored   ventilator  synchrony promoted   ventilator waveforms monitored  Intervention: Prevent Ventilator-Associated Pneumonia  Recent Flowsheet Documentation  Taken 12/30/2024 0600 by Adriana Lyn RN  Oral Care:   lip/mouth moisturizer applied   suction provided   swabbed with antiseptic solution  Head of Bed (HOB) Positioning: HOB at 30 degrees  Taken 12/30/2024 0400 by Adriana Lyn RN  Oral Care:   lip/mouth moisturizer applied   suction provided   swabbed with antiseptic solution  Head of Bed (HOB) Positioning: HOB at 30 degrees  Taken 12/30/2024 0200 by Adriana Lyn RN  Oral Care:   lip/mouth moisturizer applied   suction provided   swabbed with antiseptic solution  Head of Bed (HOB) Positioning: HOB at 30 degrees  Taken 12/30/2024 0000 by Adriana Lyn RN  Oral Care:   lip/mouth moisturizer applied   suction provided   swabbed with antiseptic solution  Head of Bed (HOB) Positioning: HOB at 30 degrees

## 2024-12-31 LAB
GLUCOSE BLDC GLUCOMTR-MCNC: 134 MG/DL (ref 70–99)
GLUCOSE BLDC GLUCOMTR-MCNC: 141 MG/DL (ref 70–99)

## 2024-12-31 PROCEDURE — 250N000011 HC RX IP 250 OP 636: Performed by: INTERNAL MEDICINE

## 2024-12-31 PROCEDURE — 250N000013 HC RX MED GY IP 250 OP 250 PS 637: Performed by: PHYSICIAN ASSISTANT

## 2024-12-31 PROCEDURE — 87641 MR-STAPH DNA AMP PROBE: CPT | Performed by: INTERNAL MEDICINE

## 2024-12-31 PROCEDURE — 94003 VENT MGMT INPAT SUBQ DAY: CPT

## 2024-12-31 PROCEDURE — 99291 CRITICAL CARE FIRST HOUR: CPT | Performed by: INTERNAL MEDICINE

## 2024-12-31 PROCEDURE — 99207 PR NO CHARGE LOS: CPT | Performed by: HOSPITALIST

## 2024-12-31 PROCEDURE — 999N000178 HC STATISTIC SUCTION SPUTUM

## 2024-12-31 PROCEDURE — 87640 STAPH A DNA AMP PROBE: CPT | Performed by: INTERNAL MEDICINE

## 2024-12-31 PROCEDURE — 999N000009 HC STATISTIC AIRWAY CARE

## 2024-12-31 PROCEDURE — 250N000013 HC RX MED GY IP 250 OP 250 PS 637: Performed by: PSYCHIATRY & NEUROLOGY

## 2024-12-31 PROCEDURE — 999N000157 HC STATISTIC RCP TIME EA 10 MIN

## 2024-12-31 PROCEDURE — 87070 CULTURE OTHR SPECIMN AEROBIC: CPT | Performed by: INTERNAL MEDICINE

## 2024-12-31 PROCEDURE — 250N000013 HC RX MED GY IP 250 OP 250 PS 637: Performed by: SURGERY

## 2024-12-31 PROCEDURE — 87205 SMEAR GRAM STAIN: CPT | Performed by: INTERNAL MEDICINE

## 2024-12-31 PROCEDURE — 120N000004 HC R&B MS OVERFLOW

## 2024-12-31 PROCEDURE — 999N000253 HC STATISTIC WEANING TRIALS

## 2024-12-31 PROCEDURE — 250N000013 HC RX MED GY IP 250 OP 250 PS 637

## 2024-12-31 PROCEDURE — 99291 CRITICAL CARE FIRST HOUR: CPT | Mod: FS | Performed by: PHYSICIAN ASSISTANT

## 2024-12-31 PROCEDURE — 258N000003 HC RX IP 258 OP 636: Performed by: INTERNAL MEDICINE

## 2024-12-31 RX ORDER — PIPERACILLIN SODIUM, TAZOBACTAM SODIUM 4; .5 G/20ML; G/20ML
4.5 INJECTION, POWDER, LYOPHILIZED, FOR SOLUTION INTRAVENOUS EVERY 6 HOURS
Status: COMPLETED | OUTPATIENT
Start: 2024-12-31 | End: 2025-01-05

## 2024-12-31 RX ADMIN — DEXTROSE AND SODIUM CHLORIDE: 5; 900 INJECTION, SOLUTION INTRAVENOUS at 04:30

## 2024-12-31 RX ADMIN — TICAGRELOR 90 MG: 90 TABLET ORAL at 08:04

## 2024-12-31 RX ADMIN — HEPARIN SODIUM 5000 UNITS: 5000 INJECTION, SOLUTION INTRAVENOUS; SUBCUTANEOUS at 06:15

## 2024-12-31 RX ADMIN — ATORVASTATIN CALCIUM 80 MG: 80 TABLET, FILM COATED ORAL at 20:26

## 2024-12-31 RX ADMIN — HEPARIN SODIUM 5000 UNITS: 5000 INJECTION, SOLUTION INTRAVENOUS; SUBCUTANEOUS at 14:29

## 2024-12-31 RX ADMIN — Medication 40 MG: at 08:04

## 2024-12-31 RX ADMIN — CILOSTAZOL 100 MG: 100 TABLET ORAL at 20:26

## 2024-12-31 RX ADMIN — CHLORHEXIDINE GLUCONATE 15 ML: 1.2 SOLUTION ORAL at 08:04

## 2024-12-31 RX ADMIN — TICAGRELOR 90 MG: 90 TABLET ORAL at 20:26

## 2024-12-31 RX ADMIN — ACETAMINOPHEN 650 MG: 325 TABLET, FILM COATED ORAL at 06:44

## 2024-12-31 RX ADMIN — ACETAMINOPHEN 650 MG: 325 TABLET, FILM COATED ORAL at 02:46

## 2024-12-31 RX ADMIN — HEPARIN SODIUM 5000 UNITS: 5000 INJECTION, SOLUTION INTRAVENOUS; SUBCUTANEOUS at 22:30

## 2024-12-31 RX ADMIN — PIPERACILLIN AND TAZOBACTAM 4.5 G: 4; .5 INJECTION, POWDER, FOR SOLUTION INTRAVENOUS at 18:39

## 2024-12-31 RX ADMIN — ACETAMINOPHEN 650 MG: 325 TABLET, FILM COATED ORAL at 14:59

## 2024-12-31 RX ADMIN — TICAGRELOR 90 MG: 90 TABLET ORAL at 00:56

## 2024-12-31 RX ADMIN — CHLORHEXIDINE GLUCONATE 15 ML: 1.2 SOLUTION ORAL at 20:26

## 2024-12-31 RX ADMIN — ACETAMINOPHEN 650 MG: 325 TABLET, FILM COATED ORAL at 10:53

## 2024-12-31 RX ADMIN — PROPOFOL 10 MCG/KG/MIN: 10 INJECTION, EMULSION INTRAVENOUS at 06:24

## 2024-12-31 RX ADMIN — CILOSTAZOL 100 MG: 100 TABLET ORAL at 08:04

## 2024-12-31 ASSESSMENT — ACTIVITIES OF DAILY LIVING (ADL)
ADLS_ACUITY_SCORE: 82

## 2024-12-31 NOTE — PROGRESS NOTES
[facility]    Vascular Neurology Progress Note    Interval History     Temp: Tmax 101.7 in last 24 hours  SBP range: 102-143  Heart rate: WNL  Wbc: 14.9>12/30 11.5  Sodium: 141>12/30 141  Blood glucose:134  Intubated  Sedated: Propofol 10 mcg/kg/min  Levophed stopped 12/29    Acute events overnight: Infectious workup in process.  MRI again showed pontine infarct as well as new right cerebellar/occipital infarcts as well as pachymeningeal enhancement suspected due to angiogram.    Exam today: Improvement in exam today (see below).  Updated family at bedside.      Hospital Course     Elio Aguilar is a 79 year old male with pertinent past medical history of a prior left corona radiata stroke in 2010 (no significant ICAD at that time), found to have PFO s/p closure at [place] per patient.  History of HTN, HLD, history of adult onset Reyes syndrome, DM2, does not see a doctor regularly.  Not on PTA ASA.    He presented to the emergency department 12/25/2024 with lightheadedness, diaphoresis, dysarthria x 5 minutes.  CTA showed severe basilar stenosis/occlusion beginning at V4/basilar junction.  No clear focal weakness or facial droop.  He was initially started on DAPT and then transition to heparin drip + ASA due to recurrent transient symptoms.  Initial MRI 12/26 negative for acute pathology.  Repeat CTA 12/27 showed some improvement in basilar narrowing.  Had new dysarthria so repeat MRI performed 12/28 and showed probable small pontine infarct.  MRA unchanged.    12/29 stroke code activated for progressive worsening of symptoms, dysarthria, hypophonia as well as new right arm/leg ataxia and right hemiparesis.  CT/CTA unchanged and underwent emergent diagnostic cerebral angiogram s/p angioplasty x 3 for severe multifocal basilar artery stenosis (required intubation due to difficulty managing secretions).  Anticoagulation/ASA stopped and started on Brilinta postprocedure.    12/30 newly  febrile/leukocytosis, infectious workup started    Assessment and Plan     # Ischemic strokes 2/2 basilar stenosis/occlusion secondary to large artery atherosclerosis s/p Balloon Angioplasty x3 of the multifocal proximal to middle basilar artery TICI 3 recanalization   #Severe bulbar weakness, dysphagia 2/2 above s/p intubation  -Neuro checks and vitals every 2 hours  -continue Brilinta 90 mg twice daily and cilostazol 100 mg twice daily, duration per neuro IR  -No aspirin due to history of adult onset Reyes syndrome   -telemetry  -Repeat CTA head/neck tomorrow (ordered)  -PT/OT/SPT, appreciate dietician recommendations on NGT/tube feeds  -Appreciate ventilator management per intensivist team, wean sedation as tolerated to slowly work towards extubation when medically appropriate  -Euthermia, euglycemia (BG <180), eunatremia  -Stroke Education, education provided on BEFAST stroke symptoms     #New fever and lactic acidosis  -appreciate intensivist evaluation and management  -Respiratory and blood cultures pending    #Hypertension  -appreciate management per primary team  -Inpatient SBP goal 120-160  -Outpatient BP goal <140/90, with tighter control if tolerated  -Recommend home blood pressure monitoring twice daily in AM and PM, keep log and bring to medical visits    #Hyperlipidemia, above goal  -Atorvastatin 80 mg daily  -Recommend LDL goal 40-70, <40 increases risk of intracranial hemorrhage  -Recommend Mediterranean diet     #Diabetes mellitus type 2  -appreciate management per primary team  -blood glucose monitoring, long term A1c goal <7%      Discussed with vascular neurology attending,     DVT Prophylaxis: okay with pharmacologic VTE ppx from NCC perspective    Patient Follow-up    - final recommendation pending work-up  -PM&R in [place]   -Follow-up in [place] at [facility] with Dr. Wan Rowan (referral made) in 6-8 weeks     We will continue to follow.       Kandis  "KAREEM Betancourt  Vascular Neurology    To page me or covering stroke neurology team member, click here: [facility]  Choose \"On Call\" tab at top, then select \"NEUROLOGY/ALL SITES\" from middle drop-down box, press Enter, then look for \"stroke\" or \"telestroke\" for your site.    Physical Examination     Temp: 100.2  F (37.9  C) Temp src: Esophageal BP: 124/54 Pulse: 74   Resp: 18 SpO2: 94 % O2 Device: Mechanical Ventilator      Neurologic  Mental Status: Not initially alert but opens eyes with mild stimulation to attend, no verbal response with ETT, follow several commands today (sedation held for exam)  Cranial Nerves: Inconsistently blinks to threat bilaterally, Left gaze preference not able to cross midline EOMI intact to look upwards, to the right and downwards on command, corneal and cough reflex intact,face grossly symmetric with ETT, follows command to protrude tongue to the side given ETT  Motor: no effort against gravity to any extremities, does follow commands to grasp with with the right hand and give thumbs up to noxious stimuli, good withdrawal to LUE with noxious stimuli, bilateral lower extremities mild withdrawal with noxious stimuli as well  Reflexes:   Deferred  Sensory:  see motor, suspect at least spatial extinction given left gaze preference  Coordination:  not able to obtain due to AMS/weakness  Station/Gait:  deferred      Imaging/Labs   (Bolded imaging and labs new and/or personally reviewed or re-reviewed by me today)    MRI and/or Head CT MRI brain 12/30: Acute pontine and scattered right cerebellar/occipital lobe ischemic infarcts, patchy meningeal enhancement throughout right cerebellar hemisphere, nonspecific but may be related to recent catheter directed angiogram  MRI 12/28: Indeterminate diffusion hyperintensity in the ventral armin, without a definite T2 or ADC correlate. Findings may reflect evolving ischemic change or artifact from the adjacent skull base.   Initial MRI: No acute stroke "   Intracranial Vasculature MRA 12/28: Unchanged     CTA 12/27: Formally read as stable from prior, however feel there is some improvement in basilar stenosis on stroke team review.     CTA: Multifocal high-grade stenosis/near occlusion of the proximal/mid basilar, bilateral PCA stenosis, moderate left M1 stenosis, moderate supraclinoid right ICA stenosis     MRA: Absent flow related contrast at the basilar artery, multifocal iCAD   Cervical Vasculature CTA: 40 to 50% right cervical ICA stenosis      Echocardiogram EF 60 to 65%, no regional wall motion abnormalities, normal left atrial size   EKG/Telemetry SR   Other Testing P2Y12: 7    Diagnostic cerebral angiogram:  successful Balloon Angioplasty x3 of the multifocal proximal to middle basilar artery stenosis TICI 3 recanalization. With residual stenosis measuring per WASID criteria. pending formal report       LDL  12/25/2024: 131 mg/dL   A1C  12/25/2024: 5.1 %   Troponin No lab value available in past 48 hrs     Other labs reviewed by me today:  UA, aerobic respiratory culture, blood cultures, lactate    Time Spent on this Encounter   Billing: I personally examined and evaluated the patient today. At the time of my evaluation and management the patient was in critical condition today due to stroke s/p balloon angioplasty and intubation. I personally managed review of chart, medical record, meds, imaging, history, exam, and discussion with attending regarding plan and documentation. I spent a total of 45 minutes providing critical care services, evaluating the patient, directing care and reviewing laboratory values and radiologic reports.     *All or a portion of this note was generated using voice recognition software and may contain transcription errors.

## 2024-12-31 NOTE — PLAN OF CARE
"Goal Outcome Evaluation:    Pt. Remains on vent., clear lungs, blood tinge secretions, of note :unsuccessful NG placement caused some irritation and small amount of bleeding, CV: SR with good perfusion, good bowel sounds, no BM, adequate urine output through betts, neuro exam with better exam once propofol weaned, pt. Able to answer yes/no questions with nods/shakes, and able to weakly squeeze hands and move toes, not strong enough for arm nor leg raises, reactive to pain in all extremities, MRI performed, OG placed and trickle feeds started, T;max of 101.4, ICU MD aware with plan to give tylelnol and if temp. Persists to obtain cultures, pt's wife and son were at bedside and were updated an supportive.      Problem: Adult Inpatient Plan of Care  Goal: Patient-Specific Goal (Individualized)  Description: You can add care plan individualizations to a care plan. Examples of Individualization might be:  \"Parent requests to be called daily at 9am for status\", \"I have a hard time hearing out of my right ear\", or \"Do not touch me to wake me up as it startles  me\".  Outcome: Not Progressing  Goal: Absence of Hospital-Acquired Illness or Injury  Outcome: Not Progressing  Intervention: Identify and Manage Fall Risk  Recent Flowsheet Documentation  Taken 12/30/2024 1600 by Elio Lan, RN  Safety Promotion/Fall Prevention:   activity supervised   clutter free environment maintained   patient and family education   room door open   room near nurse's station   room organization consistent   safety round/check completed   treat reversible contributory factors   treat underlying cause  Taken 12/30/2024 1200 by Elio Lan, RN  Safety Promotion/Fall Prevention:   activity supervised   clutter free environment maintained   patient and family education   room door open   room near nurse's station   room organization consistent   safety round/check completed   treat reversible contributory factors   treat underlying " cause  Taken 12/30/2024 0800 by Elio Lan RN  Safety Promotion/Fall Prevention:   activity supervised   clutter free environment maintained   patient and family education   room door open   room near nurse's station   room organization consistent   safety round/check completed   treat reversible contributory factors   treat underlying cause  Intervention: Prevent Skin Injury  Recent Flowsheet Documentation  Taken 12/30/2024 1800 by Elio Lan RN  Body Position:   turned   left   heels elevated   legs elevated   lower extremity elevated   side-lying 30 degrees   upper extremity elevated  Taken 12/30/2024 1600 by Elio Lan RN  Skin Protection:   adhesive use limited   incontinence pads utilized   pulse oximeter probe site changed   silicone foam dressing in place   transparent dressing maintained   tubing/devices free from skin contact  Taken 12/30/2024 1400 by Elio Lan RN  Body Position:   turned   left   heels elevated   legs elevated   lower extremity elevated   side-lying 30 degrees   upper extremity elevated  Taken 12/30/2024 1200 by Elio Lan RN  Body Position:   turned   right   heels elevated   legs elevated   side-lying 30 degrees   upper extremity elevated  Skin Protection:   adhesive use limited   incontinence pads utilized   pulse oximeter probe site changed   silicone foam dressing in place   transparent dressing maintained   tubing/devices free from skin contact  Taken 12/30/2024 1000 by Elio Lan RN  Body Position:   turned   left   heels elevated   legs elevated   lower extremity elevated   side-lying 30 degrees   upper extremity elevated  Taken 12/30/2024 0800 by Elio Lan RN  Body Position:   turned   right   heels elevated   legs elevated   side-lying 30 degrees   upper extremity elevated  Skin Protection:   adhesive use limited   incontinence pads utilized   pulse oximeter probe site changed   silicone foam dressing in place    transparent dressing maintained   tubing/devices free from skin contact  Intervention: Prevent and Manage VTE (Venous Thromboembolism) Risk  Recent Flowsheet Documentation  Taken 12/30/2024 1900 by Elio Lan RN  VTE Prevention/Management: SCDs on (sequential compression devices)  Taken 12/30/2024 1800 by Elio Lan RN  VTE Prevention/Management: SCDs on (sequential compression devices)  Taken 12/30/2024 1700 by Elio Lan RN  VTE Prevention/Management: SCDs on (sequential compression devices)  Taken 12/30/2024 1600 by Elio Lan RN  VTE Prevention/Management: SCDs on (sequential compression devices)  Taken 12/30/2024 1500 by Elio Lan RN  VTE Prevention/Management: SCDs on (sequential compression devices)  Taken 12/30/2024 1400 by Elio Lan RN  VTE Prevention/Management: SCDs on (sequential compression devices)  Taken 12/30/2024 1300 by Elio Lan RN  VTE Prevention/Management: SCDs on (sequential compression devices)  Taken 12/30/2024 1200 by Elio Lan RN  VTE Prevention/Management: SCDs on (sequential compression devices)  Taken 12/30/2024 1100 by Elio Lan RN  VTE Prevention/Management: SCDs on (sequential compression devices)  Taken 12/30/2024 1000 by Elio Lan RN  VTE Prevention/Management: SCDs on (sequential compression devices)  Taken 12/30/2024 0900 by Elio Lan RN  VTE Prevention/Management: SCDs on (sequential compression devices)  Taken 12/30/2024 0800 by Elio Lan RN  VTE Prevention/Management: SCDs on (sequential compression devices)  Intervention: Prevent Infection  Recent Flowsheet Documentation  Taken 12/30/2024 1600 by Elio Lan RN  Infection Prevention:   equipment surfaces disinfected   hand hygiene promoted   rest/sleep promoted   single patient room provided  Taken 12/30/2024 1200 by Elio Lan RN  Infection Prevention:   equipment surfaces disinfected   hand  hygiene promoted   rest/sleep promoted   single patient room provided  Taken 12/30/2024 0800 by Elio Lan, RN  Infection Prevention:   equipment surfaces disinfected   hand hygiene promoted   rest/sleep promoted   single patient room provided  Goal: Optimal Comfort and Wellbeing  Outcome: Not Progressing  Intervention: Provide Person-Centered Care  Recent Flowsheet Documentation  Taken 12/30/2024 1600 by Elio Lan RN  Trust Relationship/Rapport:   care explained   reassurance provided  Taken 12/30/2024 1200 by Elio Lan RN  Trust Relationship/Rapport:   care explained   reassurance provided  Taken 12/30/2024 0800 by Elio Lan RN  Trust Relationship/Rapport:   care explained   reassurance provided  Goal: Readiness for Transition of Care  Outcome: Not Progressing

## 2024-12-31 NOTE — PROGRESS NOTES
[facility] ICU RESPIRATORY NOTE        Date of Admission: 12/25/2024    Date of Intubation (most recent): 12/29/2024    Reason for Mechanical Ventilation: Airway Protection    Number of Days on Mechanical Ventilation: Day 3    Met Criteria for Spontaneous Breathing Trial: Yes    Bite Block: No    Significant Events Today: Patient on 5/5 40% SBT since 1130. Plan to place back on full support soon to rest overnight.    ABG Results:   Recent Labs   Lab 12/30/24  0455   PH 7.37   PCO2 36   PO2 97   HCO3 21   O2PER 30         Current Vent Settings: FiO2 (%): 40 %, Resp: 18, Vent Mode: CPAP/PS, Resp Rate (Set): 18 breaths/min, Tidal Volume (Set, mL): 500 mL, PEEP (cm H2O): 5 cmH2O, Pressure Support (cm H2O): 5 cmH2O, Resp Rate (Set): 18 breaths/min, Tidal Volume (Set, mL): 500 mL, PEEP (cm H2O): 5 cmH2O      Richard Franklin, RT on 12/31/2024 at 5:37 PM

## 2024-12-31 NOTE — PLAN OF CARE
Problem: Stroke, Ischemic (Includes Transient Ischemic Attack)  Goal: Optimal Coping  Outcome: Progressing  Goal: Effective Bowel Elimination  Outcome: Progressing  Goal: Optimal Cerebral Tissue Perfusion  Outcome: Progressing  Goal: Improved Communication Skills  Outcome: Progressing  Goal: Optimal Functional Ability  Outcome: Progressing  Intervention: Optimize Functional Ability  Recent Flowsheet Documentation  Taken 12/31/2024 0600 by Isaias Lopes RN  Activity Management: bedrest  Taken 12/31/2024 0400 by Isaias Lopes RN  Activity Management: bedrest  Taken 12/31/2024 0200 by Isaias Lopes RN  Activity Management: bedrest  Taken 12/31/2024 0000 by Isaias Lopes RN  Activity Management: bedrest  Taken 12/30/2024 2000 by Isaias Lopes RN  Activity Management: bedrest  Goal: Optimal Nutrition Intake  Outcome: Progressing  Goal: Effective Oxygenation and Ventilation  Outcome: Progressing  Intervention: Optimize Oxygenation and Ventilation  Recent Flowsheet Documentation  Taken 12/31/2024 0600 by Isaias Lopes RN  Head of Bed (HOB) Positioning: HOB at 30 degrees  Taken 12/31/2024 0400 by Isaias Lopes RN  Head of Bed (HOB) Positioning: HOB at 30 degrees  Taken 12/31/2024 0200 by Isaias Lopes RN  Head of Bed (HOB) Positioning: HOB at 30 degrees  Taken 12/31/2024 0000 by Isaias Lopes RN  Head of Bed (HOB) Positioning: HOB at 30 degrees  Taken 12/30/2024 2200 by Isaias Lopes RN  Head of Bed (HOB) Positioning: HOB at 30 degrees  Taken 12/30/2024 2000 by Isaias Lopes RN  Head of Bed (HOB) Positioning: HOB at 30 degrees  Goal: Improved Sensorimotor Function  Outcome: Progressing  Intervention: Optimize Range of Motion, Motor Control and Function  Recent Flowsheet Documentation  Taken 12/31/2024 0600 by Isaias Lopes RN  Positioning/Transfer Devices:   pillows   wedge   in use  Taken 12/31/2024 0400 by Isaias Lopes RN  Positioning/Transfer Devices:    pillows   wedge   in use  Taken 12/31/2024 0200 by Isaias Lopes RN  Positioning/Transfer Devices:   pillows   wedge   in use  Taken 12/31/2024 0000 by Isaias Lopes RN  Positioning/Transfer Devices:   pillows   wedge   in use  Taken 12/30/2024 2200 by Isaias Lopes RN  Positioning/Transfer Devices:   pillows   wedge   in use  Taken 12/30/2024 2000 by Isaias Lopes RN  Positioning/Transfer Devices:   pillows   wedge   in use  Goal: Safe and Effective Swallow  Outcome: Progressing  Intervention: Promote and Optimize Fluid and Food Intake  Recent Flowsheet Documentation  Taken 12/31/2024 0600 by Isaias Lopes RN  Aspiration Precautions: tube feeding placement verified  Taken 12/31/2024 0400 by Isaias Lopes RN  Aspiration Precautions: tube feeding placement verified  Taken 12/31/2024 0200 by Isaias Lopes RN  Aspiration Precautions: tube feeding placement verified  Taken 12/31/2024 0000 by Isaias Lopes RN  Aspiration Precautions: tube feeding placement verified  Taken 12/30/2024 2000 by Isaias Lopes RN  Aspiration Precautions: tube feeding placement verified  Goal: Effective Urinary Elimination  Outcome: Progressing       Patient remains intubated and sedated in ICU. MRI completed yesterday evening. Stable overnight on mechanical ventilator. 30% PEEP 5. Neuro status unchanged- able to weakly move all extremities and follows commands inconsistently. PERRLA. Borderline low urine output via betts. Continues to have loose stools. Tube feed advanced to 30 ml/hr. Continues to have low grade fever- providers aware and ordered pan-cultures which were collected last night. Plan pending, neurology following.

## 2024-12-31 NOTE — PROGRESS NOTES
Care Management Follow Up    Length of Stay (days): 6    Expected Discharge Date: 01/01/2025     Concerns to be Addressed:       Patient plan of care discussed at interdisciplinary rounds: Yes    Anticipated Discharge Disposition: TBD     Anticipated Discharge Services:    Anticipated Discharge DME:      Patient/family educated on Medicare website which has current facility and service quality ratings:    Education Provided on the Discharge Plan:    Patient/Family in Agreement with the Plan: yes    Referrals Placed by CM/SW:    Private pay costs discussed: Not applicable    Discussed  Partnership in Safe Discharge Planning  document with patient/family: No     Handoff Completed: No, handoff not indicated or clinically appropriate    Additional Information:  Patient transferred to ICU and requiring ICU level of care    Next Steps: SW/CC continue to follow if discharge planning needed     NABOR Arreola

## 2024-12-31 NOTE — PROGRESS NOTES
Brief Note:  Patient remains intubated today.   Frequent fevers since 12/30 evening. Work-up in progress per intensivist.  MRI overnight with pontine stroke.    Hospitalist service will sign off at this time. Please reconsult once extubated and appropriate for transfer.    Katie Bay, DO  Hospitalist

## 2024-12-31 NOTE — PROGRESS NOTES
ICU Progress Note   Date of Service: 12/31/24    Assessment and Plan:  This is an 79 year old man admitted to the ICU for airway management in the setting of a posterior CVA.  The patient underwent angioplasty to the posterior circulation CVA,  The patient was admitted to the ICU for airway protection, ventilatory support, and vent management for and a posterior stroke.      Interval events:   Unable to obtain ROS due to intubation. Patient had an MRI last evening that showed a pontine stroke.    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   - Repeat MRI 12/30 showing pontine stroke   - Propofol for sedation, goal RASS 0 to -1  - Atorvastatin 80 mg Q pm     Cardiac:   # Hypotension (resolved)  # HTN  - Goal -160 for the acute phase    Pulm:    # Acute hypoxic respiratory failure  - Continue vent support until patient is more awake  - Vent setting VC//18/5+/30%  - Daily SBT  - If no significant neuro recovery will consider plans for early trach     GI:   - S/p NGT placement  - Continue tube feeds   - Pantoprazole for GI proph     # Diarrhea  - Negative stool pathogen panel  - Immodium PRN  - Hold laxatives    :   # CKD stage 3: stable cr, monitor  - External catheter in place.     HEME:   - Heparin subQ for DVT proph     Endocrine:  - Goal glucose < 180     CODE: Full Code     PPx:  1. DVT: subQ heparin  2. VAP: HOB 30 degrees, chlorhexidine rinse  3. Stress Ulcer: PPI  4. Restraints: Nonviolent soft two point restraints required and necessary for patient safety and continued cares and good effect as patient continues to pull at necessary lines, tubes despite education and distraction. Will readdress daily.   5. Wound care - per unit routine   6. Feeding - TF  7.  Family updated at the bedside    Dispo: ICU      /58   Pulse 81   Temp 100  F (37.8  C)   Resp 18   Ht 1.829 m (6')   Wt 90.3 kg (199 lb 1.2 oz)   SpO2 93%   BMI 27.00 kg/m      FiO2 (%): 40 %, Resp: 18, Vent Mode: CPAP/PS, Resp Rate (Set): 18 breaths/min, Tidal Volume (Set, mL): 500 mL, PEEP (cm H2O): 5 cmH2O, Pressure Support (cm H2O): 5 cmH2O, Resp Rate (Set): 18 breaths/min, Tidal Volume (Set, mL): 500 mL, PEEP (cm H2O): 5 cmH2O      Intake/Output Summary (Last 24 hours) at 12/31/2024 1243  Last data filed at 12/31/2024 1200  Gross per 24 hour   Intake 2253.02 ml   Output 710 ml   Net 1543.02 ml       Physical Exam:  Gen: Laying in bed in NAD  HEENT: NC AT  Resp: intubated, B/L air entry, no wheezing or rhonchi  CVS: S1 S2, regular rhythm on tele  Abd: Soft NT ND  Ext: no swelling noted  Neuro: opens eyes to physical stimulation, follows commands on the right upper ext only, but withdraws to painful stimuli in other extremities.     Labs: reviewed    Imaging: reviewed  MRI brain wo/ w 12/30:  INTRACRANIAL CONTENTS: Restricted diffusion and associated T2/FLAIR signal hyperintensity predominantly within the armin with additional scattered foci in the right cerebellar hemisphere and right occipital lobe suspicious for areas of acute ischemic   change. Pachymeningeal enhancement is identified throughout the right cerebellar hemisphere, which is nonspecific but may be related to recent catheter directed angiogram.  No mass, acute hemorrhage, or extra-axial fluid collections. Normal brain parenchymal signal. Normal ventricles and sulci. Normal position of the cerebellar tonsils. No pathologic contrast enhancement.  SELLA: No abnormality accounting for technique.  OSSEOUS STRUCTURES/SOFT TISSUES: Normal marrow signal. The major intracranial vascular flow voids are maintained.   ORBITS: No abnormality accounting for technique.   SINUSES/MASTOIDS: No paranasal sinus mucosal disease. No middle ear or mastoid  effusion.                                                                    IMPRESSION:  1.  Findings suspicious for acute ischemic change involving the armin with additional scattered foci in the right cerebellar hemisphere and right occipital lobe.   2.  Pachymeningeal enhancement is identified throughout the right cerebellar hemisphere, which is nonspecific, but may be related to recent catheter directed angiogram.    Past Medical/Surgical history     Family history     Social history     Time Spent on this Encounter   Elio was seen and evaluated by me on 12/31/24.  He was in critical condition as the result of acute pontine stroke.      His condition is now Serious.      The acute issues managed by me today include acute CVA, acute hypoxic resp failure, and HTN.  Supportive interventions provided and/or ordered by me include MRI imaging, mechanical ventilation, neuro consultation.    Total Critical Care time spent by me, excluding procedures, was 60 minutes.    Zana Shay MD

## 2025-01-01 ENCOUNTER — APPOINTMENT (OUTPATIENT)
Dept: GENERAL RADIOLOGY | Facility: CLINIC | Age: 80
End: 2025-01-01
Attending: INTERNAL MEDICINE
Payer: MEDICARE

## 2025-01-01 ENCOUNTER — APPOINTMENT (OUTPATIENT)
Dept: CT IMAGING | Facility: CLINIC | Age: 80
End: 2025-01-01
Attending: PHYSICIAN ASSISTANT
Payer: MEDICARE

## 2025-01-01 ENCOUNTER — DOCUMENTATION ONLY (OUTPATIENT)
Dept: CARE COORDINATION | Facility: CLINIC | Age: 80
End: 2025-01-01
Payer: MEDICARE

## 2025-01-01 LAB
BACTERIA SPT CULT: NORMAL
BACTERIA UR CULT: NO GROWTH
BASE EXCESS BLDV CALC-SCNC: -0.2 MMOL/L (ref -3–3)
GLUCOSE BLDC GLUCOMTR-MCNC: 133 MG/DL (ref 70–99)
GLUCOSE BLDC GLUCOMTR-MCNC: 136 MG/DL (ref 70–99)
GLUCOSE BLDC GLUCOMTR-MCNC: 158 MG/DL (ref 70–99)
GRAM STAIN RESULT: NORMAL
GRAM STAIN RESULT: NORMAL
HCO3 BLDV-SCNC: 22 MMOL/L (ref 21–28)
MRSA DNA SPEC QL NAA+PROBE: NEGATIVE
O2/TOTAL GAS SETTING VFR VENT: 30 %
OXYHGB MFR BLDV: 91 % (ref 70–75)
PCO2 BLDV: 30 MM HG (ref 40–50)
PH BLDV: 7.48 [PH] (ref 7.32–7.43)
PLATELET # BLD AUTO: 170 10E3/UL (ref 150–450)
PO2 BLDV: 59 MM HG (ref 25–47)
SA TARGET DNA: NEGATIVE
SAO2 % BLDV: 93.1 % (ref 70–75)

## 2025-01-01 PROCEDURE — 36415 COLL VENOUS BLD VENIPUNCTURE: CPT

## 2025-01-01 PROCEDURE — 71045 X-RAY EXAM CHEST 1 VIEW: CPT

## 2025-01-01 PROCEDURE — 999N000157 HC STATISTIC RCP TIME EA 10 MIN

## 2025-01-01 PROCEDURE — 99291 CRITICAL CARE FIRST HOUR: CPT | Mod: FS | Performed by: NURSE PRACTITIONER

## 2025-01-01 PROCEDURE — 82805 BLOOD GASES W/O2 SATURATION: CPT

## 2025-01-01 PROCEDURE — 250N000013 HC RX MED GY IP 250 OP 250 PS 637: Performed by: SURGERY

## 2025-01-01 PROCEDURE — 85049 AUTOMATED PLATELET COUNT: CPT | Performed by: INTERNAL MEDICINE

## 2025-01-01 PROCEDURE — 70496 CT ANGIOGRAPHY HEAD: CPT

## 2025-01-01 PROCEDURE — 250N000013 HC RX MED GY IP 250 OP 250 PS 637: Performed by: PHYSICIAN ASSISTANT

## 2025-01-01 PROCEDURE — 94003 VENT MGMT INPAT SUBQ DAY: CPT

## 2025-01-01 PROCEDURE — 999N000253 HC STATISTIC WEANING TRIALS

## 2025-01-01 PROCEDURE — 250N000011 HC RX IP 250 OP 636: Performed by: INTERNAL MEDICINE

## 2025-01-01 PROCEDURE — 250N000013 HC RX MED GY IP 250 OP 250 PS 637: Performed by: HOSPITALIST

## 2025-01-01 PROCEDURE — 250N000011 HC RX IP 250 OP 636: Performed by: PHYSICIAN ASSISTANT

## 2025-01-01 PROCEDURE — 120N000004 HC R&B MS OVERFLOW

## 2025-01-01 PROCEDURE — 999N000065 XR CHEST PORT 1 VIEW

## 2025-01-01 PROCEDURE — 250N000013 HC RX MED GY IP 250 OP 250 PS 637

## 2025-01-01 PROCEDURE — 99291 CRITICAL CARE FIRST HOUR: CPT | Performed by: INTERNAL MEDICINE

## 2025-01-01 PROCEDURE — 250N000013 HC RX MED GY IP 250 OP 250 PS 637: Performed by: PSYCHIATRY & NEUROLOGY

## 2025-01-01 RX ORDER — IOPAMIDOL 755 MG/ML
67 INJECTION, SOLUTION INTRAVASCULAR ONCE
Status: COMPLETED | OUTPATIENT
Start: 2025-01-01 | End: 2025-01-01

## 2025-01-01 RX ADMIN — HEPARIN SODIUM 5000 UNITS: 5000 INJECTION, SOLUTION INTRAVENOUS; SUBCUTANEOUS at 22:59

## 2025-01-01 RX ADMIN — PIPERACILLIN AND TAZOBACTAM 4.5 G: 4; .5 INJECTION, POWDER, FOR SOLUTION INTRAVENOUS at 12:43

## 2025-01-01 RX ADMIN — ACETAMINOPHEN 650 MG: 325 TABLET, FILM COATED ORAL at 12:43

## 2025-01-01 RX ADMIN — CILOSTAZOL 100 MG: 100 TABLET ORAL at 07:38

## 2025-01-01 RX ADMIN — HEPARIN SODIUM 5000 UNITS: 5000 INJECTION, SOLUTION INTRAVENOUS; SUBCUTANEOUS at 14:39

## 2025-01-01 RX ADMIN — CILOSTAZOL 100 MG: 100 TABLET ORAL at 20:46

## 2025-01-01 RX ADMIN — TICAGRELOR 90 MG: 90 TABLET ORAL at 20:46

## 2025-01-01 RX ADMIN — IOPAMIDOL 67 ML: 755 INJECTION, SOLUTION INTRAVENOUS at 05:41

## 2025-01-01 RX ADMIN — ATORVASTATIN CALCIUM 80 MG: 80 TABLET, FILM COATED ORAL at 20:46

## 2025-01-01 RX ADMIN — CHLORHEXIDINE GLUCONATE 15 ML: 1.2 SOLUTION ORAL at 07:38

## 2025-01-01 RX ADMIN — PIPERACILLIN AND TAZOBACTAM 4.5 G: 4; .5 INJECTION, POWDER, FOR SOLUTION INTRAVENOUS at 06:18

## 2025-01-01 RX ADMIN — TICAGRELOR 90 MG: 90 TABLET ORAL at 07:38

## 2025-01-01 RX ADMIN — HEPARIN SODIUM 5000 UNITS: 5000 INJECTION, SOLUTION INTRAVENOUS; SUBCUTANEOUS at 06:18

## 2025-01-01 RX ADMIN — Medication 40 MG: at 07:38

## 2025-01-01 RX ADMIN — PIPERACILLIN AND TAZOBACTAM 4.5 G: 4; .5 INJECTION, POWDER, FOR SOLUTION INTRAVENOUS at 17:42

## 2025-01-01 RX ADMIN — LOPERAMIDE HYDROCHLORIDE 2 MG: 2 CAPSULE ORAL at 14:39

## 2025-01-01 RX ADMIN — CHLORHEXIDINE GLUCONATE 15 ML: 1.2 SOLUTION ORAL at 20:49

## 2025-01-01 RX ADMIN — PIPERACILLIN AND TAZOBACTAM 4.5 G: 4; .5 INJECTION, POWDER, FOR SOLUTION INTRAVENOUS at 00:23

## 2025-01-01 ASSESSMENT — ACTIVITIES OF DAILY LIVING (ADL)
ADLS_ACUITY_SCORE: 86
ADLS_ACUITY_SCORE: 82
ADLS_ACUITY_SCORE: 86
ADLS_ACUITY_SCORE: 86
ADLS_ACUITY_SCORE: 82
ADLS_ACUITY_SCORE: 86
ADLS_ACUITY_SCORE: 86
ADLS_ACUITY_SCORE: 82
ADLS_ACUITY_SCORE: 82
ADLS_ACUITY_SCORE: 86

## 2025-01-01 NOTE — PROGRESS NOTES
[facility]    Vascular Neurology Progress Note    Interval History     Temp: Tmax 101.3 in last 24 hours  SBP range: 100-158  Heart rate: WNL  Blood glucose:133  Intubated  Propofol stopped 12/31  Levophed stopped 12/29    Blood cultures NGTD, urinalysis negative.     Stroke MD discussed expected trajectory of stroke recovery and risk of recurrent stroke. Family plans to talk with Elio about goals of care to help guide medical interventions.     Hospital Course     Elio Aguilar is a 79 year old male with pertinent past medical history of a prior left corona radiata stroke in 2010 (no significant ICAD at that time), found to have PFO s/p closure at [place] per patient.  History of HTN, HLD, history of adult onset Reyes syndrome, DM2, does not see a doctor regularly.  Not on PTA ASA.    He presented to the emergency department 12/25/2024 with lightheadedness, diaphoresis, dysarthria x 5 minutes.  CTA showed severe basilar stenosis/occlusion beginning at V4/basilar junction.  No clear focal weakness or facial droop.  He was initially started on DAPT and then transition to heparin drip + ASA due to recurrent transient symptoms.  Initial MRI 12/26 negative for acute pathology.  Repeat CTA 12/27 showed some improvement in basilar narrowing.  Had new dysarthria so repeat MRI performed 12/28 and showed probable small pontine infarct.  MRA unchanged.    12/29 stroke code activated for progressive worsening of symptoms, dysarthria, hypophonia as well as new right arm/leg ataxia and right hemiparesis.  CT/CTA unchanged and underwent emergent diagnostic cerebral angiogram s/p angioplasty x 3 for severe multifocal basilar artery stenosis (required intubation due to difficulty managing secretions).  Anticoagulation/ASA stopped and started on Brilinta postprocedure.    12/30 newly febrile/leukocytosis, infectious workup started    Assessment and Plan     1. Ischemic strokes due to basilar  "stenosis/occlusion in the setting of large artery atherosclerosis. He is s/p balloon Angioplasty x3 of the multifocal proximal to middle basilar artery, TICI 3 recanalization   2. Severe bulbar weakness, dysphagia 2/2 above s/p intubation    -Neuro checks and vitals every 2 hours  -continue Brilinta 90 mg twice daily and cilostazol 100 mg twice daily, duration per neuro IR  -No aspirin due to history of adult onset Reyes syndrome   -telemetry  -Continue Lipitor 80 mg daily, goal LDL 40-70  -Mediterranean diet  -Long term A1c goal <7%  -Inpatient SBP goal 120-160. Long term outpatient BP goal <140/90, with tighter control if tolerated. Recommend home blood pressure monitoring twice daily in AM and PM, keep log and bring to medical visits  -PT/OT/SPT, appreciate dietician recommendations on NGT/tube feeds  -Appreciate ventilator management per intensivist team, wean sedation as tolerated to slowly work towards extubation when medically appropriate  -Euthermia, euglycemia (BG <180), eunatremia  -Stroke Education, education provided on [facility] stroke symptoms     3. Fever and lactic acidosis  -appreciate intensivist evaluation and management    Discussed with vascular neurology attending,     DVT Prophylaxis: okay with pharmacologic VTE ppx from NCC perspective    Patient Follow-up    - final recommendation pending work-up  -PM&R in [place]   -Follow-up in [place] at [facility] with Dr. Wan Rowan (referral made) in 6-8 weeks     We will continue to follow.     FLORY Ruth, CNP  Neurology  01/01/2025 8:30 AM  To page stroke neurology after hours or on a subsequent day, click here: [facility]  Choose \"On Call\" tab at top, then search dropdown box for \"Neurology Adult\" & press Enter, look for Neuro ICU/Stroke         Physical Examination     Temp: 99.9  F (37.7  C)   BP: 130/60 Pulse: 74     SpO2: 97 % O2 Device: Mechanical Ventilator      Neurologic  Mental Status: Not initially " alert but opens eyes with mild stimulation to attend, no verbal response with ETT, squeezes hand to command  Cranial Nerves: Left gaze preference not able to cross midline, face grossly symmetric with ETT, follows command to protrude tongue to the side given ETT  Motor: no effort against gravity to any extremities, does follow commands to grasp with the right hand, no movement in LUE to noxious, does not wiggle toes to command today  Reflexes:   Deferred  Sensory:  see motor, suspect at least spatial extinction given left gaze preference  Coordination:  not able to obtain due to AMS/weakness  Station/Gait:  deferred      Imaging/Labs   (Bolded imaging and labs new and/or personally reviewed or re-reviewed by me today)    MRI and/or Head CT MRI brain 12/30: Acute pontine and scattered right cerebellar/occipital lobe ischemic infarcts, patchy meningeal enhancement throughout right cerebellar hemisphere, nonspecific but may be related to recent catheter directed angiogram  MRI 12/28: Indeterminate diffusion hyperintensity in the ventral armin, without a definite T2 or ADC correlate. Findings may reflect evolving ischemic change or artifact from the adjacent skull base.   Initial MRI: No acute stroke   Intracranial Vasculature MRA 12/28: Unchanged     CTA 12/27: Formally read as stable from prior, however feel there is some improvement in basilar stenosis on stroke team review.     CTA: Multifocal high-grade stenosis/near occlusion of the proximal/mid basilar, bilateral PCA stenosis, moderate left M1 stenosis, moderate supraclinoid right ICA stenosis     MRA: Absent flow related contrast at the basilar artery, multifocal iCAD   Cervical Vasculature CTA: 40 to 50% right cervical ICA stenosis      Echocardiogram EF 60 to 65%, no regional wall motion abnormalities, normal left atrial size   EKG/Telemetry SR   Other Testing P2Y12: 7    Diagnostic cerebral angiogram:  successful Balloon Angioplasty x3 of the multifocal  proximal to middle basilar artery stenosis TICI 3 recanalization. With residual stenosis measuring per WASID criteria. pending formal report       LDL  12/25/2024: 131 mg/dL   A1C  12/25/2024: 5.1 %   Troponin No lab value available in past 48 hrs     Other labs reviewed by me today:  UA, aerobic respiratory culture, blood cultures, lactate    Time Spent on this Encounter   Billing: I personally examined and evaluated the patient today. At the time of my evaluation and management the patient was in critical condition today due to stroke s/p balloon angioplasty and intubation. I personally managed review of chart, medical record, meds, imaging, history, exam, and discussion with attending regarding plan and documentation. I spent a total of 30 minutes providing critical care services, evaluating the patient, directing care and reviewing laboratory values and radiologic reports.     *All or a portion of this note was generated using voice recognition software and may contain transcription errors.

## 2025-01-01 NOTE — PROGRESS NOTES
[facility] ICU RESPIRATORY NOTE           Date of Admission: 12/25/2024     Date of Intubation (most recent): 12/29/2024     Reason for Mechanical Ventilation: Airway Protection     Number of Days on Mechanical Ventilation: Day 4     Met Criteria for Spontaneous Breathing Trial: Yes     Bite Block: No     Significant Events Today: None overnight     ABG Results:   Recent Labs   Lab 12/30/24  0456   PH 7.37   PCO2 36   PO2 97   HCO3 21   O2PER 30     FiO2 (%): 30 %, Resp: 18, Vent Mode: CMV/AC, Resp Rate (Set): 18 breaths/min, Tidal Volume (Set, mL): 500 mL, PEEP (cm H2O): 5 cmH2O, Pressure Support (cm H2O): 5 cmH2O, Resp Rate (Set): 18 breaths/min, Tidal Volume (Set, mL): 500 mL, PEEP (cm H2O): 5 cmH2O    ANKITA Elliott, RRT

## 2025-01-01 NOTE — PLAN OF CARE
"  Problem: Adult Inpatient Plan of Care  Goal: Plan of Care Review  Description: The Plan of Care Review/Shift note should be completed every shift.  The Outcome Evaluation is a brief statement about your assessment that the patient is improving, declining, or no change.  This information will be displayed automatically on your shift  note.  Recent Flowsheet Documentation  Taken 1/1/2025 0639 by Arabella Ritchie RN  Outcome Evaluation: No neuro changes. Inconsistently follows commands.  Remained off sedation, pt lethargic. CTA this morning. NSR. BP stable. Large amount of secretions PO and ETT. LS Coarse. Loose BM x2. TF rate increased to 45. Adequate urine output. PIV x2.  Plan of Care Reviewed With: patient  Overall Patient Progress: no change  Taken 1/1/2025 0537 by Arabella Ritchie RN  Outcome Evaluation: No neuro changes. CTA this morning.  Plan of Care Reviewed With: patient  Overall Patient Progress: no change  Goal: Patient-Specific Goal (Individualized)  Description: You can add care plan individualizations to a care plan. Examples of Individualization might be:  \"Parent requests to be called daily at 9am for status\", \"I have a hard time hearing out of my right ear\", or \"Do not touch me to wake me up as it startles  me\".  1/1/2025 0639 by Arabella Ritchie RN  Outcome: Not Progressing  1/1/2025 0537 by Arabella Ritchie RN  Outcome: Not Progressing  1/1/2025 0536 by Arabella Ritchie RN  Outcome: Not Progressing  Goal: Absence of Hospital-Acquired Illness or Injury  1/1/2025 0639 by Arabella Ritchie RN  Outcome: Not Progressing  1/1/2025 0537 by Arabella Ritchie RN  Outcome: Not Progressing  1/1/2025 0536 by Arabella Ritchie RN  Outcome: Not Progressing  Intervention: Identify and Manage Fall Risk  Recent Flowsheet Documentation  Taken 1/1/2025 0400 by Arabella Ritchie RN  Safety Promotion/Fall Prevention:   activity supervised   clutter free environment maintained   nonskid shoes/slippers " when out of bed   room organization consistent   safety round/check completed   treat reversible contributory factors   treat underlying cause  Taken 1/1/2025 0000 by Arabella Ritchie RN  Safety Promotion/Fall Prevention:   activity supervised   clutter free environment maintained   nonskid shoes/slippers when out of bed   room organization consistent   safety round/check completed   treat reversible contributory factors   treat underlying cause  Taken 12/31/2024 2000 by Arabella Ritchie RN  Safety Promotion/Fall Prevention:   activity supervised   clutter free environment maintained   nonskid shoes/slippers when out of bed   room organization consistent   safety round/check completed   treat reversible contributory factors   treat underlying cause  Intervention: Prevent Skin Injury  Recent Flowsheet Documentation  Taken 1/1/2025 0600 by Arabella Ritchie RN  Body Position:   turned   heels elevated  Taken 1/1/2025 0400 by Arabella Ritchie RN  Body Position:   turned   heels elevated  Skin Protection:   adhesive use limited   pulse oximeter probe site changed   silicone foam dressing in place  Taken 1/1/2025 0200 by Arabella Ritchie RN  Body Position:   turned   heels elevated  Taken 1/1/2025 0000 by Arabella Ritchie RN  Body Position:   turned   heels elevated  Skin Protection:   adhesive use limited   pulse oximeter probe site changed   silicone foam dressing in place  Taken 12/31/2024 2200 by Arabella Ritchie RN  Body Position:   turned   heels elevated  Taken 12/31/2024 2000 by Arabella Ritchie RN  Body Position: turned  Skin Protection:   adhesive use limited   pulse oximeter probe site changed   silicone foam dressing in place  Intervention: Prevent and Manage VTE (Venous Thromboembolism) Risk  Recent Flowsheet Documentation  Taken 1/1/2025 0400 by Arabella Ritchie RN  VTE Prevention/Management: SCDs on (sequential compression devices)  Taken 1/1/2025 0000 by Arabella Ritchie RN  VTE  Prevention/Management: SCDs on (sequential compression devices)  Taken 12/31/2024 2000 by Arabella Ritchie RN  VTE Prevention/Management: SCDs on (sequential compression devices)  Intervention: Prevent Infection  Recent Flowsheet Documentation  Taken 1/1/2025 0400 by Arabella Ritchie RN  Infection Prevention: equipment surfaces disinfected  Taken 1/1/2025 0000 by Arabella Ritchie RN  Infection Prevention: equipment surfaces disinfected  Taken 12/31/2024 2000 by Arabella Ritchie RN  Infection Prevention: equipment surfaces disinfected  Goal: Optimal Comfort and Wellbeing  1/1/2025 0639 by Arabella Ritchie RN  Outcome: Not Progressing  1/1/2025 0537 by Arabella Ritchie RN  Outcome: Not Progressing  1/1/2025 0536 by Arabella Ritchie RN  Outcome: Not Progressing  Intervention: Provide Person-Centered Care  Recent Flowsheet Documentation  Taken 1/1/2025 0400 by Arabella Ritchie RN  Trust Relationship/Rapport:   care explained   choices provided  Taken 1/1/2025 0000 by Arabella Ritchie RN  Trust Relationship/Rapport:   care explained   choices provided  Taken 12/31/2024 2000 by Arabella Ritchie RN  Trust Relationship/Rapport:   care explained   choices provided  Goal: Readiness for Transition of Care  1/1/2025 0639 by Arabella Ritchie RN  Outcome: Not Progressing  1/1/2025 0537 by Arabella Ritchie RN  Outcome: Not Progressing  1/1/2025 0536 by Arabella Ritchie RN  Outcome: Not Progressing  Goal: Plan of Care Review  Description: The Plan of Care Review/Shift note should be completed every shift.  The Outcome Evaluation is a brief statement about your assessment that the patient is improving, declining, or no change.  This information will be displayed automatically on your shift  note.  1/1/2025 0639 by Arabella Ritchie RN  Outcome: Not Progressing  Flowsheets (Taken 1/1/2025 0639)  Outcome Evaluation: No neuro changes. Inconsistently follows commands.  Remained off sedation, pt lethargic. CTA  this morning. NSR. BP stable. Large amount of secretions PO and ETT. LS Coarse. Loose BM x2. TF rate increased to 45. Adequate urine output. PIV x2.  Plan of Care Reviewed With: patient  Overall Patient Progress: no change  1/1/2025 0537 by Arabella Ritchie RN  Outcome: Not Progressing  Flowsheets (Taken 1/1/2025 0537)  Outcome Evaluation: No neuro changes. CTA this morning.  Plan of Care Reviewed With: patient  Overall Patient Progress: no change     Problem: Mechanical Ventilation Invasive  Goal: Effective Communication  1/1/2025 0639 by Arabella Ritchie RN  Outcome: Not Progressing  1/1/2025 0537 by Arabella Ritchie RN  Outcome: Not Progressing  1/1/2025 0536 by Arabella Ritchie RN  Outcome: Not Progressing  Intervention: Ensure Effective Communication  Recent Flowsheet Documentation  Taken 1/1/2025 0400 by Arabella Ritchie RN  Communication Enhancement Strategies: one-step directions provided  Trust Relationship/Rapport:   care explained   choices provided  Taken 1/1/2025 0000 by Arabella Ritchie RN  Communication Enhancement Strategies: one-step directions provided  Trust Relationship/Rapport:   care explained   choices provided  Taken 12/31/2024 2000 by Arabella Ritchie RN  Communication Enhancement Strategies: one-step directions provided  Trust Relationship/Rapport:   care explained   choices provided  Goal: Optimal Device Function  1/1/2025 0639 by Arabella Ritchie RN  Outcome: Not Progressing  1/1/2025 0537 by Arabella Ritchie RN  Outcome: Not Progressing  1/1/2025 0536 by Arabella Ritchie RN  Outcome: Not Progressing  Intervention: Optimize Device Care and Function  Recent Flowsheet Documentation  Taken 1/1/2025 0600 by Arabella Ritchie RN  Oral Care: suction provided  Taken 1/1/2025 0400 by Arabella Ritchie RN  Airway/Ventilation Management:   airway patency maintained   calming measures promoted  Oral Care:   suction provided   swabbed with antiseptic solution   lip/mouth  moisturizer applied  Taken 1/1/2025 0000 by Arabella Ritchie RN  Airway/Ventilation Management:   airway patency maintained   calming measures promoted  Oral Care:   suction provided   swabbed with antiseptic solution   lip/mouth moisturizer applied  Taken 12/31/2024 2000 by Arabella Ritchie RN  Airway/Ventilation Management:   airway patency maintained   calming measures promoted  Oral Care:   suction provided   swabbed with antiseptic solution   lip/mouth moisturizer applied  Goal: Mechanical Ventilation Liberation  Intervention: Promote Extubation and Mechanical Ventilation Liberation  Recent Flowsheet Documentation  Taken 1/1/2025 0400 by Arabella Ritchie RN  Medication Review/Management: medications reviewed  Environmental Support: calm environment promoted  Taken 1/1/2025 0000 by Arabella Ritchie RN  Medication Review/Management: medications reviewed  Environmental Support: calm environment promoted  Taken 12/31/2024 2000 by Arabella Ritchie RN  Medication Review/Management: medications reviewed  Environmental Support: calm environment promoted  Goal: Optimal Nutrition Delivery  1/1/2025 0639 by Arabella Ritchie RN  Outcome: Not Progressing  1/1/2025 0537 by Arabella Ritchie RN  Outcome: Not Progressing  1/1/2025 0536 by Arabella Ritchie RN  Outcome: Not Progressing  Intervention: Optimize Nutrition Delivery  Recent Flowsheet Documentation  Taken 1/1/2025 0400 by Arabella Ritchie RN  Nutrition Support Management: tube feeding rate increased  Taken 1/1/2025 0000 by Arabella Ritchie RN  Nutrition Support Management: tube feeding rate increased  Taken 12/31/2024 2000 by Arabella Ritchie RN  Nutrition Support Management: tube feeding rate increased  Goal: Absence of Device-Related Skin and Tissue Injury  1/1/2025 0639 by Arabella Ritchie RN  Outcome: Not Progressing  1/1/2025 0537 by Arabella Ritchie RN  Outcome: Not Progressing  1/1/2025 0536 by Arabella Ritchie RN  Outcome: Not  Progressing  Intervention: Maintain Skin and Tissue Health  Recent Flowsheet Documentation  Taken 1/1/2025 0400 by Arabella Ritchie RN  Device Skin Pressure Protection:   absorbent pad utilized/changed   pressure points protected  Taken 1/1/2025 0000 by Arabella Ritchie RN  Device Skin Pressure Protection:   absorbent pad utilized/changed   pressure points protected  Taken 12/31/2024 2000 by Arabella Ritchie RN  Device Skin Pressure Protection:   absorbent pad utilized/changed   pressure points protected  Goal: Absence of Ventilator-Induced Lung Injury  1/1/2025 0639 by Arabella Ritchie RN  Outcome: Not Progressing  1/1/2025 0537 by Arabella Ritchie RN  Outcome: Not Progressing  1/1/2025 0536 by Arabella Ritchie RN  Outcome: Not Progressing  Intervention: Prevent Ventilator-Associated Pneumonia  Recent Flowsheet Documentation  Taken 1/1/2025 0600 by Aarbella Ritchie RN  Oral Care: suction provided  Head of Bed (HOB) Positioning: HOB at 30 degrees  Taken 1/1/2025 0400 by Arabella Ritchie RN  Oral Care:   suction provided   swabbed with antiseptic solution   lip/mouth moisturizer applied  Head of Bed (HOB) Positioning: HOB at 30 degrees  Taken 1/1/2025 0200 by Arabella Ritchie RN  Head of Bed (HOB) Positioning: HOB at 30 degrees  Taken 1/1/2025 0000 by Arabella Ritchie RN  Oral Care:   suction provided   swabbed with antiseptic solution   lip/mouth moisturizer applied  Head of Bed (HOB) Positioning: HOB at 30 degrees  Taken 12/31/2024 2200 by Arabella Ritchie RN  Head of Bed (HOB) Positioning: HOB at 30 degrees  Taken 12/31/2024 2000 by Arabella Ritchie RN  Oral Care:   suction provided   swabbed with antiseptic solution   lip/mouth moisturizer applied  Head of Bed (HOB) Positioning: HOB at 30 degrees   Goal Outcome Evaluation:      Plan of Care Reviewed With: patient    Overall Patient Progress: no changeOverall Patient Progress: no change    Outcome Evaluation: No neuro changes.  Inconsistently follows commands.  Remained off sedation, pt lethargic. CTA this morning. NSR. BP stable. Large amount of secretions PO and ETT. LS Coarse. Loose BM x2. TF rate increased to 45. Adequate urine output. PIV x2.

## 2025-01-01 NOTE — PROGRESS NOTES
ICU Progress Note   Date of Service: 01/01/25    Assessment and Plan:  This is an 79 year old man admitted to the ICU for airway management in the setting of a posterior CVA.  The patient underwent angioplasty to the posterior circulation CVA,  The patient was admitted to the ICU for airway protection, ventilatory management following a posterior stroke.       Interval events:   Unable to obtain ROS due to intubation. Patient had a CTA this AM  Discussion with the family this morning (wife, daughter and son) about clinical updates and goals of care. Neurology was in the meeting and they talked about the prognosis, the risk of recurrent stroke being 25% in a year, and need for rehab. Family asked appropriate questions. They would like to  have more time to discuss the goals of care and decide about the next course of action including possibility of extubation and tracheostomy.      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   - Repeat MRI 12/30 showing pontine stroke   - CTA this am 1/1 showed basilar artery stenosis with slightly better flow than before  - Propofol for sedation, goal RASS 0 to -1  - Atorvastatin 80 mg Q pm     Cardiac:   # Hypotension (resolved)  # HTN  - Goal -160 for the acute phase  - PRN labetalol     Pulm:    # Acute hypoxic respiratory failure  - Continue vent support until patient is more awake  - Vent setting VC//18/5+/30%  - Daily SBT  - Family to decide on goals of care, will reach out to them tomorrow    GI:   - S/p NGT placement  - Continue tube feeds   - Pantoprazole for GI proph     # Diarrhea  - Negative stool pathogen panel  - Immodium PRN  - Hold laxatives    :   # CKD stage 3: stable cr, monitor  - External catheter in  place.     HEME:   - Heparin subQ for DVT proph     Endocrine:  - Goal glucose < 180     CODE: Full Code      PPx:  1. DVT: subQ heparin  2. VAP: HOB 30 degrees, chlorhexidine rinse  3. Stress Ulcer: PPI  4. Restraints: Nonviolent soft two point restraints required and necessary for patient safety and continued cares and good effect as patient continues to pull at necessary lines, tubes despite education and distraction. Will readdress daily.   5. Wound care - per unit routine   6. Feeding - TF  7. Family updated at the bedside     Dispo: ICU      BP (!) 147/59   Pulse 84   Temp 99.9  F (37.7  C)   Resp 18   Ht 1.829 m (6')   Wt 90.9 kg (200 lb 6.4 oz)   SpO2 97%   BMI 27.18 kg/m      FiO2 (%): 30 %, Resp: 18, Vent Mode: CPAP/PS, Resp Rate (Set): 18 breaths/min, Tidal Volume (Set, mL): 500 mL, PEEP (cm H2O): 5 cmH2O, Pressure Support (cm H2O): 5 cmH2O, Resp Rate (Set): 18 breaths/min, Tidal Volume (Set, mL): 500 mL, PEEP (cm H2O): 5 cmH2O      Intake/Output Summary (Last 24 hours) at 1/1/2025 1114  Last data filed at 1/1/2025 1000  Gross per 24 hour   Intake 1454.28 ml   Output 855 ml   Net 599.28 ml       Physical Exam:  Gen: Laying in bed in NAD  HEENT: NC AT  Resp: intubated, B/L air entry, no wheezing or rhonchi  CVS: S1 S2, regular rhythm on tele  Abd: Soft NT ND  Ext: no swelling noted  Neuro: opens eyes to physical stimulation, follows commands on the right upper ext only, but withdraws to painful stimuli in other extremities.     Labs: reviewed    Imaging: reviewed    Past Medical/Surgical history     Family history     Social history     Time Spent on this Encounter   Elio was seen and evaluated by me on 01/01/25.  He was in critical condition as the result of acute pontine stroke.      His condition is now Serious.      The acute issues managed by me today include acute hypoxic respiratory failure, acute stroke, and basilar artery stenosis.     Supportive interventions provided and/or ordered by  me include: mechanical ventilation, CTA of the brain, and goals of care discussion.     Total Critical Care time spent by me, excluding procedures, was 75 minutes.    Zana Shay MD

## 2025-01-02 LAB
ALBUMIN SERPL BCG-MCNC: 3.1 G/DL (ref 3.5–5.2)
ALP SERPL-CCNC: 65 U/L (ref 40–150)
ALT SERPL W P-5'-P-CCNC: 35 U/L (ref 0–70)
ANION GAP SERPL CALCULATED.3IONS-SCNC: 12 MMOL/L (ref 7–15)
AST SERPL W P-5'-P-CCNC: 23 U/L (ref 0–45)
ATRIAL RATE - MUSE: 131 BPM
ATRIAL RATE - MUSE: 90 BPM
BACTERIA BLD CULT: NORMAL
BACTERIA BLD CULT: NORMAL
BACTERIA SPT CULT: NORMAL
BASOPHILS # BLD AUTO: 0 10E3/UL (ref 0–0.2)
BASOPHILS NFR BLD AUTO: 0 %
BILIRUB SERPL-MCNC: 0.7 MG/DL
BUN SERPL-MCNC: 31 MG/DL (ref 8–23)
CALCIUM SERPL-MCNC: 8.5 MG/DL (ref 8.8–10.4)
CHLORIDE SERPL-SCNC: 110 MMOL/L (ref 98–107)
CREAT SERPL-MCNC: 1.56 MG/DL (ref 0.67–1.17)
DIASTOLIC BLOOD PRESSURE - MUSE: NORMAL MMHG
DIASTOLIC BLOOD PRESSURE - MUSE: NORMAL MMHG
EGFRCR SERPLBLD CKD-EPI 2021: 44 ML/MIN/1.73M2
EOSINOPHIL # BLD AUTO: 0.1 10E3/UL (ref 0–0.7)
EOSINOPHIL NFR BLD AUTO: 1 %
ERYTHROCYTE [DISTWIDTH] IN BLOOD BY AUTOMATED COUNT: 12.3 % (ref 10–15)
GLUCOSE BLDC GLUCOMTR-MCNC: 142 MG/DL (ref 70–99)
GLUCOSE BLDC GLUCOMTR-MCNC: 149 MG/DL (ref 70–99)
GLUCOSE BLDC GLUCOMTR-MCNC: 156 MG/DL (ref 70–99)
GLUCOSE BLDC GLUCOMTR-MCNC: 167 MG/DL (ref 70–99)
GLUCOSE BLDC GLUCOMTR-MCNC: 170 MG/DL (ref 70–99)
GLUCOSE SERPL-MCNC: 154 MG/DL (ref 70–99)
GRAM STAIN RESULT: NORMAL
HCO3 SERPL-SCNC: 21 MMOL/L (ref 22–29)
HCT VFR BLD AUTO: 35.9 % (ref 40–53)
HGB BLD-MCNC: 12.9 G/DL (ref 13.3–17.7)
IMM GRANULOCYTES # BLD: 0.1 10E3/UL
IMM GRANULOCYTES NFR BLD: 1 %
INTERPRETATION ECG - MUSE: NORMAL
INTERPRETATION ECG - MUSE: NORMAL
LYMPHOCYTES # BLD AUTO: 0.5 10E3/UL (ref 0.8–5.3)
LYMPHOCYTES NFR BLD AUTO: 6 %
MAGNESIUM SERPL-MCNC: 2.1 MG/DL (ref 1.7–2.3)
MCH RBC QN AUTO: 32.2 PG (ref 26.5–33)
MCHC RBC AUTO-ENTMCNC: 35.9 G/DL (ref 31.5–36.5)
MCV RBC AUTO: 90 FL (ref 78–100)
MONOCYTES # BLD AUTO: 1 10E3/UL (ref 0–1.3)
MONOCYTES NFR BLD AUTO: 10 %
NEUTROPHILS # BLD AUTO: 7.5 10E3/UL (ref 1.6–8.3)
NEUTROPHILS NFR BLD AUTO: 82 %
NRBC # BLD AUTO: 0 10E3/UL
NRBC BLD AUTO-RTO: 0 /100
P AXIS - MUSE: 15 DEGREES
P AXIS - MUSE: 64 DEGREES
PHOSPHATE SERPL-MCNC: 1.8 MG/DL (ref 2.5–4.5)
PLATELET # BLD AUTO: 167 10E3/UL (ref 150–450)
POTASSIUM SERPL-SCNC: 3.5 MMOL/L (ref 3.4–5.3)
PR INTERVAL - MUSE: 128 MS
PR INTERVAL - MUSE: 134 MS
PROT SERPL-MCNC: 6.1 G/DL (ref 6.4–8.3)
QRS DURATION - MUSE: 82 MS
QRS DURATION - MUSE: 82 MS
QT - MUSE: 280 MS
QT - MUSE: 354 MS
QTC - MUSE: 413 MS
QTC - MUSE: 433 MS
R AXIS - MUSE: -1 DEGREES
R AXIS - MUSE: 15 DEGREES
RBC # BLD AUTO: 4.01 10E6/UL (ref 4.4–5.9)
SODIUM SERPL-SCNC: 143 MMOL/L (ref 135–145)
SYSTOLIC BLOOD PRESSURE - MUSE: NORMAL MMHG
SYSTOLIC BLOOD PRESSURE - MUSE: NORMAL MMHG
T AXIS - MUSE: 211 DEGREES
T AXIS - MUSE: 259 DEGREES
VENTRICULAR RATE- MUSE: 131 BPM
VENTRICULAR RATE- MUSE: 90 BPM
WBC # BLD AUTO: 9.1 10E3/UL (ref 4–11)

## 2025-01-02 PROCEDURE — 250N000013 HC RX MED GY IP 250 OP 250 PS 637: Performed by: INTERNAL MEDICINE

## 2025-01-02 PROCEDURE — 80053 COMPREHEN METABOLIC PANEL: CPT | Performed by: INTERNAL MEDICINE

## 2025-01-02 PROCEDURE — 94003 VENT MGMT INPAT SUBQ DAY: CPT

## 2025-01-02 PROCEDURE — 250N000011 HC RX IP 250 OP 636: Performed by: INTERNAL MEDICINE

## 2025-01-02 PROCEDURE — 83735 ASSAY OF MAGNESIUM: CPT | Performed by: INTERNAL MEDICINE

## 2025-01-02 PROCEDURE — 250N000013 HC RX MED GY IP 250 OP 250 PS 637: Performed by: SURGERY

## 2025-01-02 PROCEDURE — 120N000004 HC R&B MS OVERFLOW

## 2025-01-02 PROCEDURE — 85025 COMPLETE CBC W/AUTO DIFF WBC: CPT | Performed by: INTERNAL MEDICINE

## 2025-01-02 PROCEDURE — 250N000013 HC RX MED GY IP 250 OP 250 PS 637

## 2025-01-02 PROCEDURE — 999N000157 HC STATISTIC RCP TIME EA 10 MIN

## 2025-01-02 PROCEDURE — 93010 ELECTROCARDIOGRAM REPORT: CPT | Performed by: INTERNAL MEDICINE

## 2025-01-02 PROCEDURE — 258N000003 HC RX IP 258 OP 636: Performed by: INTERNAL MEDICINE

## 2025-01-02 PROCEDURE — 250N000013 HC RX MED GY IP 250 OP 250 PS 637: Performed by: HOSPITALIST

## 2025-01-02 PROCEDURE — 250N000013 HC RX MED GY IP 250 OP 250 PS 637: Performed by: PSYCHIATRY & NEUROLOGY

## 2025-01-02 PROCEDURE — 99291 CRITICAL CARE FIRST HOUR: CPT | Mod: FS | Performed by: NURSE PRACTITIONER

## 2025-01-02 PROCEDURE — 250N000009 HC RX 250: Performed by: INTERNAL MEDICINE

## 2025-01-02 PROCEDURE — 36415 COLL VENOUS BLD VENIPUNCTURE: CPT | Performed by: INTERNAL MEDICINE

## 2025-01-02 PROCEDURE — 99291 CRITICAL CARE FIRST HOUR: CPT | Performed by: INTERNAL MEDICINE

## 2025-01-02 PROCEDURE — 87205 SMEAR GRAM STAIN: CPT | Performed by: INTERNAL MEDICINE

## 2025-01-02 PROCEDURE — 250N000013 HC RX MED GY IP 250 OP 250 PS 637: Performed by: STUDENT IN AN ORGANIZED HEALTH CARE EDUCATION/TRAINING PROGRAM

## 2025-01-02 PROCEDURE — 999N000009 HC STATISTIC AIRWAY CARE

## 2025-01-02 PROCEDURE — 93005 ELECTROCARDIOGRAM TRACING: CPT

## 2025-01-02 PROCEDURE — 250N000013 HC RX MED GY IP 250 OP 250 PS 637: Performed by: PHYSICIAN ASSISTANT

## 2025-01-02 PROCEDURE — 84100 ASSAY OF PHOSPHORUS: CPT | Performed by: INTERNAL MEDICINE

## 2025-01-02 RX ORDER — LORAZEPAM 2 MG/ML
1-2 INJECTION INTRAMUSCULAR EVERY 4 HOURS PRN
Status: DISCONTINUED | OUTPATIENT
Start: 2025-01-02 | End: 2025-01-02

## 2025-01-02 RX ORDER — DEXMEDETOMIDINE HYDROCHLORIDE 4 UG/ML
.1-1.2 INJECTION, SOLUTION INTRAVENOUS CONTINUOUS
Status: DISCONTINUED | OUTPATIENT
Start: 2025-01-02 | End: 2025-01-07

## 2025-01-02 RX ORDER — ATORVASTATIN CALCIUM 40 MG/1
80 TABLET, FILM COATED ORAL EVERY EVENING
Status: DISCONTINUED | OUTPATIENT
Start: 2025-01-02 | End: 2025-01-06

## 2025-01-02 RX ADMIN — POTASSIUM & SODIUM PHOSPHATES POWDER PACK 280-160-250 MG 2 PACKET: 280-160-250 PACK at 23:55

## 2025-01-02 RX ADMIN — TICAGRELOR 90 MG: 90 TABLET ORAL at 07:52

## 2025-01-02 RX ADMIN — PIPERACILLIN AND TAZOBACTAM 4.5 G: 4; .5 INJECTION, POWDER, FOR SOLUTION INTRAVENOUS at 12:47

## 2025-01-02 RX ADMIN — PIPERACILLIN AND TAZOBACTAM 4.5 G: 4; .5 INJECTION, POWDER, FOR SOLUTION INTRAVENOUS at 23:55

## 2025-01-02 RX ADMIN — ATORVASTATIN CALCIUM 80 MG: 40 TABLET, FILM COATED ORAL at 20:19

## 2025-01-02 RX ADMIN — SODIUM CHLORIDE 500 ML: 9 INJECTION, SOLUTION INTRAVENOUS at 01:00

## 2025-01-02 RX ADMIN — POTASSIUM & SODIUM PHOSPHATES POWDER PACK 280-160-250 MG 2 PACKET: 280-160-250 PACK at 20:19

## 2025-01-02 RX ADMIN — HEPARIN SODIUM 5000 UNITS: 5000 INJECTION, SOLUTION INTRAVENOUS; SUBCUTANEOUS at 06:15

## 2025-01-02 RX ADMIN — HEPARIN SODIUM 5000 UNITS: 5000 INJECTION, SOLUTION INTRAVENOUS; SUBCUTANEOUS at 14:25

## 2025-01-02 RX ADMIN — ACETAMINOPHEN 650 MG: 325 TABLET, FILM COATED ORAL at 20:19

## 2025-01-02 RX ADMIN — CHLORHEXIDINE GLUCONATE 15 ML: 1.2 SOLUTION ORAL at 20:18

## 2025-01-02 RX ADMIN — PIPERACILLIN AND TAZOBACTAM 4.5 G: 4; .5 INJECTION, POWDER, FOR SOLUTION INTRAVENOUS at 06:13

## 2025-01-02 RX ADMIN — CILOSTAZOL 100 MG: 100 TABLET ORAL at 20:19

## 2025-01-02 RX ADMIN — PIPERACILLIN AND TAZOBACTAM 4.5 G: 4; .5 INJECTION, POWDER, FOR SOLUTION INTRAVENOUS at 00:22

## 2025-01-02 RX ADMIN — POTASSIUM & SODIUM PHOSPHATES POWDER PACK 280-160-250 MG 2 PACKET: 280-160-250 PACK at 16:10

## 2025-01-02 RX ADMIN — LABETALOL HYDROCHLORIDE 10 MG: 5 INJECTION INTRAVENOUS at 20:06

## 2025-01-02 RX ADMIN — Medication 40 MG: at 07:52

## 2025-01-02 RX ADMIN — PIPERACILLIN AND TAZOBACTAM 4.5 G: 4; .5 INJECTION, POWDER, FOR SOLUTION INTRAVENOUS at 18:25

## 2025-01-02 RX ADMIN — LORAZEPAM 1 MG: 2 INJECTION INTRAMUSCULAR; INTRAVENOUS at 09:32

## 2025-01-02 RX ADMIN — CILOSTAZOL 100 MG: 100 TABLET ORAL at 07:52

## 2025-01-02 RX ADMIN — LABETALOL HYDROCHLORIDE 10 MG: 5 INJECTION INTRAVENOUS at 15:10

## 2025-01-02 RX ADMIN — CHLORHEXIDINE GLUCONATE 15 ML: 1.2 SOLUTION ORAL at 07:52

## 2025-01-02 RX ADMIN — TICAGRELOR 90 MG: 90 TABLET ORAL at 20:19

## 2025-01-02 RX ADMIN — LOPERAMIDE HYDROCHLORIDE 2 MG: 2 CAPSULE ORAL at 21:17

## 2025-01-02 RX ADMIN — DEXMEDETOMIDINE HYDROCHLORIDE 0.2 MCG/KG/HR: 400 INJECTION INTRAVENOUS at 22:10

## 2025-01-02 ASSESSMENT — ACTIVITIES OF DAILY LIVING (ADL)
ADLS_ACUITY_SCORE: 86
ADLS_ACUITY_SCORE: 86
ADLS_ACUITY_SCORE: 84
ADLS_ACUITY_SCORE: 84
ADLS_ACUITY_SCORE: 86
ADLS_ACUITY_SCORE: 84
ADLS_ACUITY_SCORE: 84
ADLS_ACUITY_SCORE: 86
ADLS_ACUITY_SCORE: 84
ADLS_ACUITY_SCORE: 86
ADLS_ACUITY_SCORE: 84
ADLS_ACUITY_SCORE: 86

## 2025-01-02 NOTE — PROGRESS NOTES
[facility] ICU RESPIRATORY NOTE        Date of Admission: 12/25/2024    Date of Intubation (most recent): 12/29/24    Reason for Mechanical Ventilation: stroke    Number of Days on Mechanical Ventilation: 4    Met Criteria for Spontaneous Breathing Trial: no    Reason for No Spontaneous Breathing Trial: pt is not ready    Bite Block: none  Significant Events Today: sputum culture were done    ABG Results:   Recent Labs   Lab 01/01/25  2116 12/30/24  0455   PH  --  7.37   PCO2  --  36   PO2  --  97   HCO3  --  21   O2PER 30 30         Current Vent Settings: FiO2 (%): 30 %, Vent Mode: PCV Plus assist, Resp Rate (Set): 18 breaths/min, Tidal Volume (Set, mL): 500 mL, PEEP (cm H2O): 5 cmH2O, Pressure Support (cm H2O): 15 cmH2O, Resp Rate (Set): 18 breaths/min, Tidal Volume (Set, mL): 500 mL, PEEP (cm H2O): 5 cmH2O    Skin Assessment: no issues    Plan: monitor, pt has a weak gag reflex, pt developed thick, yellow/gray, blood tinged mucus    Sophia Becerra, RT on 1/2/2025 at 2:29 PM

## 2025-01-02 NOTE — PLAN OF CARE
SLP - Given continued intubation, plan to cancel current SLP orders.  Please re-consult SLP if pt extubated/stable 24 hours s/p extubation.

## 2025-01-02 NOTE — PROGRESS NOTES
ICU Progress Note   Date of Service: 01/02/25    Assessment and Plan:  This is an 79 year old man admitted to the ICU for airway management in the setting of a posterior CVA.  The patient underwent angioplasty to the posterior circulation CVA,  The patient was admitted to the ICU for airway protection, ventilatory management following a posterior stroke.       Interval events:   No major events overnight, had a stool management system placed for recurrent diarrhea.      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   - Repeat MRI 12/30 showing pontine stroke   - CTA 1/1 showed basilar artery stenosis with slightly better flow than before  - Remains at high risk for recurrent stroke, up to 25% /yr per neurology  - Propofol on and off for sedation, goal RASS 0 to -1  - Atorvastatin 80 mg Q pm     Cardiac:   # Hypotension (resolved)  # HTN  - Goal -160 for the acute phase  - PRN labetalol     Pulm:    # Acute hypoxic respiratory failure  - Continue vent support until patient is more awake  - Vent setting VC//18/5+/30%  - Daily SBT  - Family to decide on goals of care    GI:   - S/p NGT placement  - Continue tube feeds   - Pantoprazole for GI proph     # Diarrhea  - Negative stool pathogen panel  - Immodium PRN  - Hold laxatives    :   # CKD stage 3: stable cr, monitor  - External catheter in place.    # Hypokalemia  - replace     HEME:   - Heparin subQ for DVT proph     Endocrine:  - Goal glucose < 180     CODE: Full Code      PPx:  1. DVT: subQ heparin  2. VAP: HOB 30 degrees, chlorhexidine rinse  3. Stress Ulcer: PPI  4. Restraints: Nonviolent soft upper extremity restraints required and necessary for patient safety and continued cares and good effect as patient  continues to pull at necessary lines, tubes despite education and distraction. Will readdress daily.   5. Wound care - per unit routine   6. Feeding - TF  7. Family updated at the bedside     Dispo: ICU    /70   Pulse 92   Temp 99.9  F (37.7  C)   Resp 18   Ht 1.829 m (6')   Wt 92.4 kg (203 lb 11.3 oz)   SpO2 95%   BMI 27.63 kg/m      FiO2 (%): 30 %, Vent Mode: PCV Plus assist, Resp Rate (Set): 18 breaths/min, Tidal Volume (Set, mL): 500 mL, PEEP (cm H2O): 5 cmH2O, Pressure Support (cm H2O): 15 cmH2O, Resp Rate (Set): 18 breaths/min, Tidal Volume (Set, mL): 500 mL, PEEP (cm H2O): 5 cmH2O      Intake/Output Summary (Last 24 hours) at 1/2/2025 1243  Last data filed at 1/2/2025 1000  Gross per 24 hour   Intake 1980 ml   Output 1035 ml   Net 945 ml       Physical Exam:  Gen: Laying in bed in NAD  HEENT: NC AT  Resp: intubated, B/L air entry, no wheezing or rhonchi  CVS: S1 S2, regular rhythm on tele  Abd: Soft NT ND  Ext: no swelling noted  Neuro: opens eyes spontaneously, follows commands on the right upper ext only, but withdraws to painful stimuli in lower extremities.     Labs: reviewed    Imaging: reviewed    Past Medical/Surgical history     Family history     Social history     Time Spent on this Encounter   Elio was seen and evaluated by me on 01/02/25.  He was in critical condition as the result of acute CVA.    His condition is now Guarded.      The acute issues managed by me today include  acute hypoxic respiratory failure, acute CVA, left sided paralysis.     Supportive interventions provided and/or ordered by me include mechanical ventilation,     Total Critical Care time spent by me, excluding procedures, was 45 minutes.    Zana Shay MD

## 2025-01-02 NOTE — PLAN OF CARE
Goal Outcome Evaluation:      -PS trial 5 hours tolerated.    -Pt follows, able to raise eyebrows and close /open eyes to yes/no questions for     communication.    -pt wife, children, grandchildren bedside    - questions answered and support given.  -Tolerating TF  -External rectal pouch placed for diarrhea and Imodium given.      Problem: Adult Inpatient Plan of Care  Goal: Absence of Hospital-Acquired Illness or Injury  Intervention: Identify and Manage Fall Risk  Recent Flowsheet Documentation  Taken 1/1/2025 1800 by Ramos Deleon, RN  Safety Promotion/Fall Prevention:   treat underlying cause   treat reversible contributory factors   supervised activity   safety round/check completed   room organization consistent   room near nurse's station   room door open   patient and family education   nonskid shoes/slippers when out of bed   mobility aid in reach   lighting adjusted   increase visualization of patient   increased rounding and observation   clutter free environment maintained   assistive device/personal items within reach   activity supervised  Taken 1/1/2025 1601 by Ramos Deleon, RN  Safety Promotion/Fall Prevention:   treat underlying cause   treat reversible contributory factors   supervised activity   safety round/check completed   room organization consistent   room near nurse's station   room door open   patient and family education   nonskid shoes/slippers when out of bed   mobility aid in reach   lighting adjusted   increase visualization of patient   increased rounding and observation   clutter free environment maintained   assistive device/personal items within reach   activity supervised  Taken 1/1/2025 1400 by Ramos Deleon, RN  Safety Promotion/Fall Prevention:   treat underlying cause   treat reversible contributory factors   supervised activity   safety round/check completed   room organization consistent   room near nurse's station   room door open   patient and family education    nonskid shoes/slippers when out of bed   mobility aid in reach   lighting adjusted   increase visualization of patient   increased rounding and observation   clutter free environment maintained   assistive device/personal items within reach   activity supervised  Taken 1/1/2025 1200 by Ramos Deleon RN  Safety Promotion/Fall Prevention:   treat underlying cause   treat reversible contributory factors   supervised activity   safety round/check completed   room organization consistent   room near nurse's station   room door open   patient and family education   nonskid shoes/slippers when out of bed   mobility aid in reach   lighting adjusted   increase visualization of patient   increased rounding and observation   clutter free environment maintained   assistive device/personal items within reach   activity supervised  Intervention: Prevent Skin Injury  Recent Flowsheet Documentation  Taken 1/1/2025 1800 by Ramos Deleon RN  Body Position:   turned   heels elevated   legs elevated  Skin Protection: tubing/devices free from skin contact  Taken 1/1/2025 1601 by Ramos Deleon RN  Body Position:   turned   heels elevated   legs elevated  Skin Protection: tubing/devices free from skin contact  Taken 1/1/2025 1400 by Ramos Deleon RN  Body Position:   turned   heels elevated   legs elevated  Skin Protection: tubing/devices free from skin contact  Taken 1/1/2025 1200 by Ramos Deleon RN  Body Position:   turned   heels elevated   legs elevated  Skin Protection: tubing/devices free from skin contact  Intervention: Prevent and Manage VTE (Venous Thromboembolism) Risk  Recent Flowsheet Documentation  Taken 1/1/2025 1800 by Ramos Deleon RN  VTE Prevention/Management: SCDs on (sequential compression devices)  Taken 1/1/2025 1601 by Ramos Deleon RN  VTE Prevention/Management: SCDs on (sequential compression devices)  Taken 1/1/2025 1400 by Ramos Deleon RN  VTE Prevention/Management: SCDs on  (sequential compression devices)  Taken 1/1/2025 1200 by Ramos Deloen RN  VTE Prevention/Management: SCDs on (sequential compression devices)  Intervention: Prevent Infection  Recent Flowsheet Documentation  Taken 1/1/2025 1800 by Ramos Deleon RN  Infection Prevention:   cohorting utilized   environmental surveillance performed   equipment surfaces disinfected   hand hygiene promoted   personal protective equipment utilized   rest/sleep promoted   single patient room provided   visitors restricted/screened  Taken 1/1/2025 1601 by Ramos Deleon RN  Infection Prevention:   cohorting utilized   environmental surveillance performed   equipment surfaces disinfected   hand hygiene promoted   personal protective equipment utilized   rest/sleep promoted   single patient room provided   visitors restricted/screened  Taken 1/1/2025 1400 by Ramos Deleon RN  Infection Prevention:   cohorting utilized   environmental surveillance performed   equipment surfaces disinfected   hand hygiene promoted   personal protective equipment utilized   rest/sleep promoted   single patient room provided   visitors restricted/screened  Taken 1/1/2025 1200 by Ramos Deleon RN  Infection Prevention:   cohorting utilized   environmental surveillance performed   equipment surfaces disinfected   hand hygiene promoted   personal protective equipment utilized   rest/sleep promoted   single patient room provided   visitors restricted/screened  Goal: Optimal Comfort and Wellbeing  Intervention: Provide Person-Centered Care  Recent Flowsheet Documentation  Taken 1/1/2025 1800 by Ramos Deleon RN  Trust Relationship/Rapport:   care explained   choices provided   emotional support provided   empathic listening provided   reassurance provided   questions encouraged   questions answered   thoughts/feelings acknowledged  Taken 1/1/2025 1601 by Ramos Deleon RN  Trust Relationship/Rapport:   care explained   choices provided    emotional support provided   empathic listening provided   reassurance provided   questions encouraged   questions answered   thoughts/feelings acknowledged  Taken 1/1/2025 1400 by Ramos Deleon, RN  Trust Relationship/Rapport:   care explained   choices provided   emotional support provided   empathic listening provided   reassurance provided   questions encouraged   questions answered   thoughts/feelings acknowledged  Taken 1/1/2025 1200 by Ramos Deleon, RN  Trust Relationship/Rapport:   care explained   choices provided   emotional support provided   empathic listening provided   reassurance provided   questions encouraged   questions answered   thoughts/feelings acknowledged   Goal Outcome Evaluation:

## 2025-01-02 NOTE — PROGRESS NOTES
[facility]    Vascular Neurology Progress Note    Interval History     Temp: Tmax 100.36in last 24 hours  SBP range: 136-173  Heart rate: WNL  Blood glucose:142  Intubated  Propofol stopped 12/31  Levophed stopped 12/29    Awaiting further discussion with family regarding goals of care.     Hospital Course     Elio Aguilar is a 79 year old male with pertinent past medical history of a prior left corona radiata stroke in 2010 (no significant ICAD at that time), found to have PFO s/p closure at [place] per patient.  History of HTN, HLD, history of adult onset Reyes syndrome, DM2, does not see a doctor regularly.  Not on PTA ASA.    He presented to the emergency department 12/25/2024 with lightheadedness, diaphoresis, dysarthria x 5 minutes.  CTA showed severe basilar stenosis/occlusion beginning at V4/basilar junction.  No clear focal weakness or facial droop.  He was initially started on DAPT and then transition to heparin drip + ASA due to recurrent transient symptoms.  Initial MRI 12/26 negative for acute pathology.  Repeat CTA 12/27 showed some improvement in basilar narrowing.  Had new dysarthria so repeat MRI performed 12/28 and showed probable small pontine infarct.  MRA unchanged.    12/29 stroke code activated for progressive worsening of symptoms, dysarthria, hypophonia as well as new right arm/leg ataxia and right hemiparesis.  Underwent emergent diagnostic cerebral angiogram s/p angioplasty x 3 for severe multifocal basilar artery stenosis (required intubation due to difficulty managing secretions).   12/30 newly febrile/leukocytosis, infectious workup started    Assessment and Plan     1. 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 He is s/p balloon Angioplasty x3 of the multifocal proximal to middle basilar artery, TICI 3 recanalization   2. Severe bulbar weakness, dysphagia 2/2 above s/p intubation    -Neuro checks  "and vitals every 2 hours  -continue Brilinta 90 mg twice daily and cilostazol 100 mg twice daily, duration per neuro IR  -No aspirin due to history of adult onset Reyes syndrome   -Continue Lipitor 80 mg daily, goal LDL 40-70  ---Mediterranean diet  -Long term A1c goal <7%  -Inpatient SBP goal 120-160. Long term outpatient BP goal <140/90, with tighter control if tolerated. Recommend home blood pressure monitoring twice daily in AM and PM, keep log and bring to medical visits  -PT/OT/SPT  -Appreciate ventilator management per intensivist team, wean sedation as tolerated to slowly work towards extubation when medically appropriate  -Euthermia, euglycemia (BG <180), eunatremia  -Stroke Education, education provided on BEFAST stroke symptoms   -Palliative care consulted to assist with goals of care    3. Fever and lactic acidosis  -appreciate intensivist evaluation and management    Discussed with vascular neurology attending,     DVT Prophylaxis: okay with pharmacologic VTE ppx from NCC perspective    Patient Follow-up    - final recommendation pending work-up  -PM&R in [place]   -Follow-up in [place] at [facility] with Dr. Wan Rowan (referral made) in 6-8 weeks     We will continue to follow.     FLORY Ruth, CNP  Neurology  01/02/2025 12:06 PM  To page stroke neurology after hours or on a subsequent day, click here: [facility]  Choose \"On Call\" tab at top, then search dropdown box for \"Neurology Adult\" & press Enter, look for Neuro ICU/Stroke         Physical Examination     Temp: 99.9  F (37.7  C) Temp src: Esophageal BP: 136/70 Pulse: 92     SpO2: 95 % O2 Device: Mechanical Ventilator      Neurologic  Mental Status: Briskly opens eyes to voice, follows commands (squeeze hand, thumbs up), nods head in answer to questions  Cranial Nerves: Left gaze preference but able to cross to just past midline, face grossly symmetric with ETT, follows command to protrude tongue to the " side given ETT  Motor: no effort against gravity to any extremities, does follow commands to grasp with the right hand, no movement in LUE to noxious, does not wiggle toes to command today  Reflexes:   Deferred  Sensory:  see motor, sensation appears grossly intact  Coordination:  not able to obtain due to AMS/weakness  Station/Gait:  deferred    Imaging/Labs   (Bolded imaging and labs new and/or personally reviewed or re-reviewed by me today)    MRI and/or Head CT MRI brain 12/30: Acute pontine and scattered right cerebellar/occipital lobe ischemic infarcts, patchy meningeal enhancement throughout right cerebellar hemisphere, nonspecific but may be related to recent catheter directed angiogram  MRI 12/28: Indeterminate diffusion hyperintensity in the ventral armin, without a definite T2 or ADC correlate. Findings may reflect evolving ischemic change or artifact from the adjacent skull base.   Initial MRI: No acute stroke   Intracranial Vasculature MRA 12/28: Unchanged     CTA 12/27: Formally read as stable from prior, however feel there is some improvement in basilar stenosis on stroke team review.     CTA: Multifocal high-grade stenosis/near occlusion of the proximal/mid basilar, bilateral PCA stenosis, moderate left M1 stenosis, moderate supraclinoid right ICA stenosis     MRA: Absent flow related contrast at the basilar artery, multifocal iCAD   Cervical Vasculature CTA: 40 to 50% right cervical ICA stenosis      Echocardiogram EF 60 to 65%, no regional wall motion abnormalities, normal left atrial size   EKG/Telemetry SR   Other Testing P2Y12: 7    Diagnostic cerebral angiogram:  successful Balloon Angioplasty x3 of the multifocal proximal to middle basilar artery stenosis TICI 3 recanalization. With residual stenosis measuring per WASID criteria. pending formal report       LDL  12/25/2024: 131 mg/dL   A1C  12/25/2024: 5.1 %   Troponin No lab value available in past 48 hrs     Other labs reviewed by me  today:  JOSE KEITH    Time Spent on this Encounter   Billing: I personally examined and evaluated the patient today. At the time of my evaluation and management the patient was in critical condition today due to stroke s/p balloon angioplasty and intubation. I personally managed review of chart, medical record, meds, imaging, history, exam, and discussion with attending regarding plan and documentation. I spent a total of 30 minutes providing critical care services, evaluating the patient, directing care and reviewing laboratory values and radiologic reports.

## 2025-01-02 NOTE — PROGRESS NOTES
CLINICAL NUTRITION SERVICES - REASSESSMENT NOTE      Malnutrition: 12/30  % Weight Loss:  None noted  % Intake:  Decreased intake does not meet criteria for malnutrition (was eating well prior to intubation)  Subcutaneous Fat Loss:  None observed  Muscle Loss:  None observed  Fluid Retention:  None noted     Malnutrition Diagnosis: Patient does not meet two of the above criteria necessary for diagnosing malnutrition       EVALUATION OF PROGRESS TOWARD GOALS   Diet:  NPO on vent     Nutrition Support:  Patient started on TF 12/30, advanced to goal on 1/1, and continues as follows ~    Nutrition Support Enteral:  Type of Feeding Tube: NG  Enteral Frequency:  Continuous  Enteral Regimen: Pivot 1.5 at 60 mL/hr  Total Enteral Provisions: 2160 kcal (24 kcal/kg and 97% needs), 135 g protein (1.5 g/kg), 248 g CHO, 11 g fiber, 1080 mL H2O  Free Water Flush: 60 mL every 4 hours     Intake/Tolerance:    K/Mg normal  Phos 1.8 (L)  -167   BM x 1 today and x 5 yesterday (rectal tube placed yesterday per rounds), patient given Imodium 12/29 and 1/1  I/O 2020/1025, wt 92.4 kg (up overall)      ASSESSED NUTRITION NEEDS:  Dosing Weight 89.4 kg   Estimated Energy Needs: 5317-6773 kcals (25-30 Kcal/Kg)  Justification: maintenance  Estimated Protein Needs: 105-135 grams protein (1.2-1.5 g pro/Kg)  Justification: hypercatabolism with acute illness  Estimated Fluid Needs: 0711-9472 mL (1 mL/Kcal)  Justification: or per MD      NEW FINDINGS:   Patient was previously receiving D5 IVF and Propofol but both were discontinued on 12/31    Family deciding re: GOC     Previous Goals (12/30):   Goal TF regimen will meet % needs   Evaluation: Met    Previous Nutrition Diagnosis (12/30):   Inadequate protein-energy intake related to NPO with plans to start TF today as evidenced by meeting 0% protein and 15% energy needs from IVF + Propofol   Evaluation: Resolved       CURRENT NUTRITION DIAGNOSIS  No nutrition diagnosis identified at  this time as TF meeting needs     INTERVENTIONS  Recommendations / Nutrition Prescription  Continue goal TF regimen as above     Implementation  Collaboration and Referral of Nutrition care: Patient discussed today during interdisciplinary bedside rounds    Goals  Goal TF regimen will continue to meet % needs     MONITORING AND EVALUATION:  Progress towards goals will be monitored and evaluated per protocol and Practice Guidelines    Lakisha Ruiz RD, LD, CNSC   Clinical Dietitian - [facility]

## 2025-01-02 NOTE — PLAN OF CARE
"  Problem: Adult Inpatient Plan of Care  Goal: Plan of Care Review  Description: The Plan of Care Review/Shift note should be completed every shift.  The Outcome Evaluation is a brief statement about your assessment that the patient is improving, declining, or no change.  This information will be displayed automatically on your shift  note.  Recent Flowsheet Documentation  Taken 1/2/2025 0601 by Arabella Ritchie, RN  Outcome Evaluation: No neuro changes. Follows simple commands. Lethargic. NSR. BP stable. Still having a large amount of secretions through ETT. LS Coarse. Loose BMs, rectal pouch in place. TF rate at 60. Low urine output and large amount of sediment in urine. 500 bolus given. No improvement. Bladder scan 35 mL, betts starting to leak,  irrigated betts and met resistance. MD okay with changing cath out. Improved drainage after exchange. PIV x2.  Overall Patient Progress: no change  Goal: Patient-Specific Goal (Individualized)  Description: You can add care plan individualizations to a care plan. Examples of Individualization might be:  \"Parent requests to be called daily at 9am for status\", \"I have a hard time hearing out of my right ear\", or \"Do not touch me to wake me up as it startles  me\".  Outcome: Not Progressing  Goal: Absence of Hospital-Acquired Illness or Injury  Outcome: Not Progressing  Intervention: Identify and Manage Fall Risk  Recent Flowsheet Documentation  Taken 1/2/2025 0400 by Arabella Ritchie, RN  Safety Promotion/Fall Prevention:   activity supervised   clutter free environment maintained   nonskid shoes/slippers when out of bed   room organization consistent   safety round/check completed   treat reversible contributory factors   treat underlying cause  Taken 1/2/2025 0000 by Arabella Ritchie, RN  Safety Promotion/Fall Prevention:   activity supervised   clutter free environment maintained   nonskid shoes/slippers when out of bed   room organization consistent   safety " round/check completed   treat reversible contributory factors   treat underlying cause  Taken 1/1/2025 2000 by Arabella Ritchie RN  Safety Promotion/Fall Prevention:   activity supervised   clutter free environment maintained   nonskid shoes/slippers when out of bed   room organization consistent   safety round/check completed   treat reversible contributory factors   treat underlying cause  Intervention: Prevent Skin Injury  Recent Flowsheet Documentation  Taken 1/2/2025 0600 by Arabella Ritchie RN  Body Position:   turned   heels elevated  Taken 1/2/2025 0400 by Arabella Ritchie RN  Body Position:   turned   heels elevated  Skin Protection:   adhesive use limited   pulse oximeter probe site changed   silicone foam dressing in place  Taken 1/2/2025 0200 by Arabella Ritchie RN  Body Position:   turned   heels elevated  Taken 1/2/2025 0000 by Arabella Ritchie RN  Body Position:   turned   heels elevated  Skin Protection:   adhesive use limited   pulse oximeter probe site changed   silicone foam dressing in place  Taken 1/1/2025 2200 by Arabella Ritchie RN  Body Position:   turned   heels elevated  Taken 1/1/2025 2000 by Arabella Ritchie RN  Body Position:   turned   heels elevated  Skin Protection:   adhesive use limited   pulse oximeter probe site changed   silicone foam dressing in place  Intervention: Prevent and Manage VTE (Venous Thromboembolism) Risk  Recent Flowsheet Documentation  Taken 1/2/2025 0400 by Arabella Ritchie RN  VTE Prevention/Management: SCDs on (sequential compression devices)  Taken 1/2/2025 0000 by Arabella Ritchie RN  VTE Prevention/Management: SCDs on (sequential compression devices)  Taken 1/1/2025 2000 by Arabella Ritchie RN  VTE Prevention/Management: SCDs on (sequential compression devices)  Intervention: Prevent Infection  Recent Flowsheet Documentation  Taken 1/2/2025 0400 by Arabella Ritchie RN  Infection Prevention: equipment surfaces disinfected  Taken 1/2/2025  0000 by Arabella Ritchie RN  Infection Prevention: equipment surfaces disinfected  Taken 1/1/2025 2000 by Arabella Ritchie RN  Infection Prevention: equipment surfaces disinfected  Goal: Optimal Comfort and Wellbeing  Outcome: Not Progressing  Intervention: Provide Person-Centered Care  Recent Flowsheet Documentation  Taken 1/2/2025 0400 by Arabella Ritchie RN  Trust Relationship/Rapport:   care explained   choices provided  Taken 1/2/2025 0000 by Arabella Ritchie RN  Trust Relationship/Rapport:   care explained   choices provided  Taken 1/1/2025 2000 by Arabella Ritchie RN  Trust Relationship/Rapport:   care explained   choices provided  Goal: Readiness for Transition of Care  Outcome: Not Progressing  Goal: Plan of Care Review  Description: The Plan of Care Review/Shift note should be completed every shift.  The Outcome Evaluation is a brief statement about your assessment that the patient is improving, declining, or no change.  This information will be displayed automatically on your shift  note.  Outcome: Not Progressing  Flowsheets (Taken 1/2/2025 0601)  Outcome Evaluation: No neuro changes. Follows simple commands. Lethargic. NSR. BP stable. Still having a large amount of secretions through ETT. LS Coarse. Loose BMs, rectal pouch in place. TF rate at 60. Low urine output and large amount of sediment in urine. 500 bolus given. No improvement. Bladder scan 35 mL, betts starting to leak,  irrigated betts and met resistance. MD okay with changing cath out. Improved drainage after exchange. PIV x2.  Overall Patient Progress: no change     Problem: Mechanical Ventilation Invasive  Goal: Effective Communication  Outcome: Not Progressing  Intervention: Ensure Effective Communication  Recent Flowsheet Documentation  Taken 1/2/2025 0400 by Arabella Ritchie RN  Communication Enhancement Strategies: one-step directions provided  Trust Relationship/Rapport:   care explained   choices provided  Taken 1/2/2025  0000 by Arabella Ritchie RN  Communication Enhancement Strategies: one-step directions provided  Trust Relationship/Rapport:   care explained   choices provided  Taken 1/1/2025 2000 by Arabella Ritchie RN  Communication Enhancement Strategies: one-step directions provided  Trust Relationship/Rapport:   care explained   choices provided  Goal: Optimal Device Function  Outcome: Not Progressing  Intervention: Optimize Device Care and Function  Recent Flowsheet Documentation  Taken 1/2/2025 0400 by Arabella Ritchie RN  Airway/Ventilation Management:   airway patency maintained   calming measures promoted  Oral Care:   suction provided   swabbed with antiseptic solution   lip/mouth moisturizer applied  Taken 1/2/2025 0000 by Arabella Ritchie RN  Airway/Ventilation Management:   airway patency maintained   calming measures promoted  Oral Care:   suction provided   swabbed with antiseptic solution   lip/mouth moisturizer applied  Taken 1/1/2025 2200 by Arabella Ritchie RN  Oral Care: suction provided  Taken 1/1/2025 2000 by Arabella Ritchie RN  Airway/Ventilation Management:   airway patency maintained   calming measures promoted  Oral Care:   suction provided   swabbed with antiseptic solution   lip/mouth moisturizer applied  Goal: Mechanical Ventilation Liberation  Intervention: Promote Extubation and Mechanical Ventilation Liberation  Recent Flowsheet Documentation  Taken 1/2/2025 0400 by Arabella Ritchie RN  Medication Review/Management: medications reviewed  Environmental Support: calm environment promoted  Taken 1/2/2025 0000 by Arabella Ritchie RN  Medication Review/Management: medications reviewed  Environmental Support: calm environment promoted  Taken 1/1/2025 2000 by Arabella Ritchie RN  Medication Review/Management: medications reviewed  Environmental Support: calm environment promoted  Goal: Optimal Nutrition Delivery  Outcome: Not Progressing  Intervention: Optimize Nutrition  Delivery  Recent Flowsheet Documentation  Taken 1/2/2025 0400 by Arabella Ritchie RN  Nutrition Support Management: tube feeding rate increased  Taken 1/2/2025 0000 by Arabella Ritchie RN  Nutrition Support Management: tube feeding rate increased  Taken 1/1/2025 2000 by Arabella Ritchie RN  Nutrition Support Management: tube feeding rate increased  Goal: Absence of Device-Related Skin and Tissue Injury  Outcome: Not Progressing  Intervention: Maintain Skin and Tissue Health  Recent Flowsheet Documentation  Taken 1/2/2025 0400 by Arabella Ritchie RN  Device Skin Pressure Protection:   absorbent pad utilized/changed   pressure points protected  Taken 1/2/2025 0000 by Arabella Ritchie RN  Device Skin Pressure Protection:   absorbent pad utilized/changed   pressure points protected  Taken 1/1/2025 2000 by Arabella Ritchie RN  Device Skin Pressure Protection:   absorbent pad utilized/changed   pressure points protected  Goal: Absence of Ventilator-Induced Lung Injury  Outcome: Not Progressing  Intervention: Prevent Ventilator-Associated Pneumonia  Recent Flowsheet Documentation  Taken 1/2/2025 0600 by Arabella Ritchie RN  Head of Bed (HOB) Positioning: HOB at 30 degrees  Taken 1/2/2025 0400 by Arabella Ritchie RN  VAP Prevention Bundle: HOB elevation maintained  Oral Care:   suction provided   swabbed with antiseptic solution   lip/mouth moisturizer applied  Head of Bed (HOB) Positioning: HOB at 30 degrees  VAP Prevention Measures: completed  Taken 1/2/2025 0200 by Arabella Ritchie RN  Head of Bed (HOB) Positioning: HOB at 30 degrees  Taken 1/2/2025 0000 by Arabella Ritchie RN  VAP Prevention Bundle: HOB elevation maintained  Oral Care:   suction provided   swabbed with antiseptic solution   lip/mouth moisturizer applied  Head of Bed (HOB) Positioning: HOB at 30 degrees  VAP Prevention Measures: completed  Taken 1/1/2025 2200 by Arabella Ritchie RN  Oral Care: suction provided  Head of Bed (HOB)  Positioning: HOB at 30 degrees  Taken 1/1/2025 2000 by Arabella Ritchie RN  VAP Prevention Bundle: HOB elevation maintained  Oral Care:   suction provided   swabbed with antiseptic solution   lip/mouth moisturizer applied  Head of Bed (HOB) Positioning: HOB at 30 degrees  VAP Prevention Measures: completed   Goal Outcome Evaluation:      Plan of Care Reviewed With: patient    Overall Patient Progress: no changeOverall Patient Progress: no change    Outcome Evaluation: No neuro changes. Follows simple commands. Lethargic. NSR. BP stable. Still having a large amount of secretions through ETT. LS Coarse. Loose BMs, rectal pouch in place. TF rate at 60. Low urine output and large amount of sediment in urine. 500 bolus given. No improvement. Bladder scan 35 mL, betts starting to leak,  irrigated betts and met resistance. MD okay with changing cath out. Improved drainage after exchange. PIV x2.

## 2025-01-03 VITALS
HEART RATE: 68 BPM | TEMPERATURE: 98.4 F | DIASTOLIC BLOOD PRESSURE: 68 MMHG | SYSTOLIC BLOOD PRESSURE: 133 MMHG | BODY MASS INDEX: 27.59 KG/M2 | RESPIRATION RATE: 21 BRPM | HEIGHT: 72 IN | OXYGEN SATURATION: 95 % | WEIGHT: 203.71 LBS

## 2025-01-03 LAB
ANION GAP SERPL CALCULATED.3IONS-SCNC: 13 MMOL/L (ref 7–15)
BASE EXCESS BLDV CALC-SCNC: 0.6 MMOL/L (ref -3–3)
BASOPHILS # BLD AUTO: 0.1 10E3/UL (ref 0–0.2)
BASOPHILS NFR BLD AUTO: 1 %
BUN SERPL-MCNC: 35.7 MG/DL (ref 8–23)
CALCIUM SERPL-MCNC: 8.7 MG/DL (ref 8.8–10.4)
CHLORIDE SERPL-SCNC: 113 MMOL/L (ref 98–107)
CREAT SERPL-MCNC: 1.34 MG/DL (ref 0.67–1.17)
EGFRCR SERPLBLD CKD-EPI 2021: 53 ML/MIN/1.73M2
EOSINOPHIL # BLD AUTO: 0.1 10E3/UL (ref 0–0.7)
EOSINOPHIL NFR BLD AUTO: 2 %
ERYTHROCYTE [DISTWIDTH] IN BLOOD BY AUTOMATED COUNT: 12.9 % (ref 10–15)
ERYTHROCYTE [DISTWIDTH] IN BLOOD BY AUTOMATED COUNT: 12.9 % (ref 10–15)
GLUCOSE BLDC GLUCOMTR-MCNC: 111 MG/DL (ref 70–99)
GLUCOSE BLDC GLUCOMTR-MCNC: 164 MG/DL (ref 70–99)
GLUCOSE BLDC GLUCOMTR-MCNC: 173 MG/DL (ref 70–99)
GLUCOSE SERPL-MCNC: 176 MG/DL (ref 70–99)
HCO3 BLDV-SCNC: 24 MMOL/L (ref 21–28)
HCO3 SERPL-SCNC: 19 MMOL/L (ref 22–29)
HCT VFR BLD AUTO: 35.8 % (ref 40–53)
HCT VFR BLD AUTO: 37.6 % (ref 40–53)
HGB BLD-MCNC: 12.6 G/DL (ref 13.3–17.7)
HGB BLD-MCNC: 13.2 G/DL (ref 13.3–17.7)
IMM GRANULOCYTES # BLD: 0.1 10E3/UL
IMM GRANULOCYTES NFR BLD: 2 %
LYMPHOCYTES # BLD AUTO: 0.7 10E3/UL (ref 0.8–5.3)
LYMPHOCYTES NFR BLD AUTO: 9 %
MAGNESIUM SERPL-MCNC: 2.5 MG/DL (ref 1.7–2.3)
MCH RBC QN AUTO: 32.2 PG (ref 26.5–33)
MCH RBC QN AUTO: 32.5 PG (ref 26.5–33)
MCHC RBC AUTO-ENTMCNC: 35.1 G/DL (ref 31.5–36.5)
MCHC RBC AUTO-ENTMCNC: 35.2 G/DL (ref 31.5–36.5)
MCV RBC AUTO: 92 FL (ref 78–100)
MCV RBC AUTO: 93 FL (ref 78–100)
MONOCYTES # BLD AUTO: 0.9 10E3/UL (ref 0–1.3)
MONOCYTES NFR BLD AUTO: 10 %
NEUTROPHILS # BLD AUTO: 6.4 10E3/UL (ref 1.6–8.3)
NEUTROPHILS NFR BLD AUTO: 77 %
NRBC # BLD AUTO: 0 10E3/UL
NRBC BLD AUTO-RTO: 0 /100
O2/TOTAL GAS SETTING VFR VENT: 30 %
OXYHGB MFR BLDV: 96 % (ref 70–75)
PCO2 BLDV: 34 MM HG (ref 40–50)
PH BLDV: 7.45 [PH] (ref 7.32–7.43)
PHOSPHATE SERPL-MCNC: 3.2 MG/DL (ref 2.5–4.5)
PLATELET # BLD AUTO: 175 10E3/UL (ref 150–450)
PLATELET # BLD AUTO: 205 10E3/UL (ref 150–450)
PO2 BLDV: 85 MM HG (ref 25–47)
POTASSIUM SERPL-SCNC: 3.5 MMOL/L (ref 3.4–5.3)
POTASSIUM SERPL-SCNC: 3.6 MMOL/L (ref 3.4–5.3)
RBC # BLD AUTO: 3.91 10E6/UL (ref 4.4–5.9)
RBC # BLD AUTO: 4.06 10E6/UL (ref 4.4–5.9)
SAO2 % BLDV: 97.5 % (ref 70–75)
SODIUM SERPL-SCNC: 145 MMOL/L (ref 135–145)
WBC # BLD AUTO: 11 10E3/UL (ref 4–11)
WBC # BLD AUTO: 8.3 10E3/UL (ref 4–11)

## 2025-01-03 PROCEDURE — 99291 CRITICAL CARE FIRST HOUR: CPT | Mod: 25 | Performed by: INTERNAL MEDICINE

## 2025-01-03 PROCEDURE — 999N000157 HC STATISTIC RCP TIME EA 10 MIN

## 2025-01-03 PROCEDURE — 85004 AUTOMATED DIFF WBC COUNT: CPT

## 2025-01-03 PROCEDURE — 36415 COLL VENOUS BLD VENIPUNCTURE: CPT | Performed by: INTERNAL MEDICINE

## 2025-01-03 PROCEDURE — 94003 VENT MGMT INPAT SUBQ DAY: CPT

## 2025-01-03 PROCEDURE — 99223 1ST HOSP IP/OBS HIGH 75: CPT | Performed by: NURSE PRACTITIONER

## 2025-01-03 PROCEDURE — 250N000009 HC RX 250: Performed by: SURGERY

## 2025-01-03 PROCEDURE — 84132 ASSAY OF SERUM POTASSIUM: CPT | Performed by: INTERNAL MEDICINE

## 2025-01-03 PROCEDURE — 85048 AUTOMATED LEUKOCYTE COUNT: CPT

## 2025-01-03 PROCEDURE — 99418 PROLNG IP/OBS E/M EA 15 MIN: CPT | Performed by: NURSE PRACTITIONER

## 2025-01-03 PROCEDURE — 120N000004 HC R&B MS OVERFLOW

## 2025-01-03 PROCEDURE — 31500 INSERT EMERGENCY AIRWAY: CPT | Mod: GC | Performed by: INTERNAL MEDICINE

## 2025-01-03 PROCEDURE — 82805 BLOOD GASES W/O2 SATURATION: CPT | Performed by: INTERNAL MEDICINE

## 2025-01-03 PROCEDURE — 250N000009 HC RX 250: Performed by: INTERNAL MEDICINE

## 2025-01-03 PROCEDURE — 250N000011 HC RX IP 250 OP 636: Performed by: INTERNAL MEDICINE

## 2025-01-03 PROCEDURE — 85018 HEMOGLOBIN: CPT | Performed by: INTERNAL MEDICINE

## 2025-01-03 PROCEDURE — 84100 ASSAY OF PHOSPHORUS: CPT | Performed by: INTERNAL MEDICINE

## 2025-01-03 PROCEDURE — 99291 CRITICAL CARE FIRST HOUR: CPT | Mod: FS | Performed by: PHYSICIAN ASSISTANT

## 2025-01-03 PROCEDURE — 250N000013 HC RX MED GY IP 250 OP 250 PS 637: Performed by: PSYCHIATRY & NEUROLOGY

## 2025-01-03 PROCEDURE — 36415 COLL VENOUS BLD VENIPUNCTURE: CPT

## 2025-01-03 PROCEDURE — 250N000013 HC RX MED GY IP 250 OP 250 PS 637: Performed by: PHYSICIAN ASSISTANT

## 2025-01-03 PROCEDURE — 250N000013 HC RX MED GY IP 250 OP 250 PS 637: Performed by: SURGERY

## 2025-01-03 PROCEDURE — 80048 BASIC METABOLIC PNL TOTAL CA: CPT | Performed by: INTERNAL MEDICINE

## 2025-01-03 PROCEDURE — 999N000253 HC STATISTIC WEANING TRIALS

## 2025-01-03 PROCEDURE — 85041 AUTOMATED RBC COUNT: CPT | Performed by: INTERNAL MEDICINE

## 2025-01-03 PROCEDURE — 83735 ASSAY OF MAGNESIUM: CPT | Performed by: INTERNAL MEDICINE

## 2025-01-03 PROCEDURE — 250N000013 HC RX MED GY IP 250 OP 250 PS 637: Performed by: STUDENT IN AN ORGANIZED HEALTH CARE EDUCATION/TRAINING PROGRAM

## 2025-01-03 PROCEDURE — 999N000259 HC STATISTIC EXTUBATION

## 2025-01-03 PROCEDURE — 250N000013 HC RX MED GY IP 250 OP 250 PS 637: Performed by: HOSPITALIST

## 2025-01-03 RX ORDER — ETOMIDATE 2 MG/ML
30 INJECTION INTRAVENOUS ONCE
Status: COMPLETED | OUTPATIENT
Start: 2025-01-03 | End: 2025-01-03

## 2025-01-03 RX ADMIN — CHLORHEXIDINE GLUCONATE 15 ML: 1.2 SOLUTION ORAL at 08:18

## 2025-01-03 RX ADMIN — ATORVASTATIN CALCIUM 80 MG: 40 TABLET, FILM COATED ORAL at 19:58

## 2025-01-03 RX ADMIN — ETOMIDATE INJECTION 30 MG: 2 SOLUTION INTRAVENOUS at 15:59

## 2025-01-03 RX ADMIN — CILOSTAZOL 100 MG: 100 TABLET ORAL at 19:58

## 2025-01-03 RX ADMIN — TICAGRELOR 90 MG: 90 TABLET ORAL at 19:58

## 2025-01-03 RX ADMIN — PIPERACILLIN AND TAZOBACTAM 4.5 G: 4; .5 INJECTION, POWDER, FOR SOLUTION INTRAVENOUS at 18:46

## 2025-01-03 RX ADMIN — PIPERACILLIN AND TAZOBACTAM 4.5 G: 4; .5 INJECTION, POWDER, FOR SOLUTION INTRAVENOUS at 05:56

## 2025-01-03 RX ADMIN — LOPERAMIDE HYDROCHLORIDE 2 MG: 2 CAPSULE ORAL at 08:27

## 2025-01-03 RX ADMIN — LABETALOL HYDROCHLORIDE 10 MG: 5 INJECTION INTRAVENOUS at 16:29

## 2025-01-03 RX ADMIN — TICAGRELOR 90 MG: 90 TABLET ORAL at 08:18

## 2025-01-03 RX ADMIN — CILOSTAZOL 100 MG: 100 TABLET ORAL at 08:18

## 2025-01-03 RX ADMIN — PANTOPRAZOLE SODIUM 40 MG: 40 INJECTION, POWDER, FOR SOLUTION INTRAVENOUS at 08:18

## 2025-01-03 RX ADMIN — CHLORHEXIDINE GLUCONATE 15 ML: 1.2 SOLUTION ORAL at 19:58

## 2025-01-03 RX ADMIN — LOPERAMIDE HYDROCHLORIDE 2 MG: 2 CAPSULE ORAL at 08:24

## 2025-01-03 RX ADMIN — DEXMEDETOMIDINE HYDROCHLORIDE 0.3 MCG/KG/HR: 400 INJECTION INTRAVENOUS at 11:14

## 2025-01-03 RX ADMIN — LABETALOL HYDROCHLORIDE 10 MG: 5 INJECTION INTRAVENOUS at 11:13

## 2025-01-03 RX ADMIN — ROCURONIUM BROMIDE 100 MG: 10 INJECTION, SOLUTION INTRAVENOUS at 15:59

## 2025-01-03 RX ADMIN — PIPERACILLIN AND TAZOBACTAM 4.5 G: 4; .5 INJECTION, POWDER, FOR SOLUTION INTRAVENOUS at 11:31

## 2025-01-03 ASSESSMENT — ACTIVITIES OF DAILY LIVING (ADL)
ADLS_ACUITY_SCORE: 86
ADLS_ACUITY_SCORE: 82
ADLS_ACUITY_SCORE: 86
ADLS_ACUITY_SCORE: 82
ADLS_ACUITY_SCORE: 86
ADLS_ACUITY_SCORE: 82
ADLS_ACUITY_SCORE: 86

## 2025-01-03 NOTE — PROGRESS NOTES
Per MD to extubate. Pt was preoxygenated, sxened (ETT and oral), emergency supplies are ready. Pt was extubated to room air but shortly required a lot of suctioning (oral and NT). Nasal trumpet was inserted. Copious amount of secretions were suctioned, creamy, thick, bloody tinged. BLBS are coarse, weak cough, PT is on oxymask 15L, SPO2 94. Nurse is at the bedside. I will keep monitor pt.

## 2025-01-03 NOTE — PLAN OF CARE
"Patient here due to CVA. Mechanically ventilated peep 5 & fio2 30%. Tele shows normal sinus with PAC's and has intermittent episodes of sinus tachycardia/ a-fib with PAC's; MD aware and multiple ECG's obtained. Opens eyes to voice and able to withdrawal in his RUE and moves LUE to command both LE withdrawal to pain. Lung sounds course and diminished; more so on the right then left. Sputum culture obtained as red tinged sputum noted. K, MG and P replacement protocols initiated. Phosphorus replaced this shift with recheck in the AM. Family at bedside this shift and updated throughout the day. Palliative consult tomorrow. TKO infusing.  Problem: Adult Inpatient Plan of Care  Goal: Patient-Specific Goal (Individualized)  Description: You can add care plan individualizations to a care plan. Examples of Individualization might be:  \"Parent requests to be called daily at 9am for status\", \"I have a hard time hearing out of my right ear\", or \"Do not touch me to wake me up as it startles  me\".  Outcome: Progressing  Goal: Absence of Hospital-Acquired Illness or Injury  Outcome: Progressing  Intervention: Identify and Manage Fall Risk  Recent Flowsheet Documentation  Taken 1/2/2025 1600 by Preston Costello, RN  Safety Promotion/Fall Prevention:   activity supervised   clutter free environment maintained   nonskid shoes/slippers when out of bed   room organization consistent   safety round/check completed   treat reversible contributory factors   treat underlying cause  Taken 1/2/2025 1200 by Preston Costello, RN  Safety Promotion/Fall Prevention:   activity supervised   clutter free environment maintained   nonskid shoes/slippers when out of bed   room organization consistent   safety round/check completed   treat reversible contributory factors   treat underlying cause  Taken 1/2/2025 0800 by Preston Costello, RN  Safety Promotion/Fall Prevention:   activity supervised   clutter free environment maintained   nonskid shoes/slippers " when out of bed   room organization consistent   safety round/check completed   treat reversible contributory factors   treat underlying cause  Intervention: Prevent Skin Injury  Recent Flowsheet Documentation  Taken 1/2/2025 1800 by Preston Costello RN  Body Position:   turned   right   heels elevated   upper extremity elevated  Taken 1/2/2025 1600 by Preston Costello RN  Body Position:   turned   left   heels elevated   upper extremity elevated  Skin Protection:   adhesive use limited   incontinence pads utilized   silicone foam dressing in place  Taken 1/2/2025 1400 by Preston Costello RN  Body Position:   turned   right   heels elevated   upper extremity elevated  Taken 1/2/2025 1200 by Preston Costello RN  Body Position:   turned   left   heels elevated   upper extremity elevated  Skin Protection:   adhesive use limited   incontinence pads utilized   silicone foam dressing in place  Taken 1/2/2025 1000 by Preston Costello RN  Body Position:   turned   right   heels elevated   upper extremity elevated  Taken 1/2/2025 0800 by Preston Costello RN  Body Position:   turned   left   heels elevated   upper extremity elevated  Skin Protection:   adhesive use limited   incontinence pads utilized   silicone foam dressing in place  Intervention: Prevent and Manage VTE (Venous Thromboembolism) Risk  Recent Flowsheet Documentation  Taken 1/2/2025 1600 by Preston Costello RN  VTE Prevention/Management: SCDs on (sequential compression devices)  Taken 1/2/2025 1200 by Preston Costello RN  VTE Prevention/Management: SCDs on (sequential compression devices)  Taken 1/2/2025 0800 by Preston Costello RN  VTE Prevention/Management: SCDs on (sequential compression devices)  Intervention: Prevent Infection  Recent Flowsheet Documentation  Taken 1/2/2025 1600 by Preston Costello RN  Infection Prevention: equipment surfaces disinfected  Taken 1/2/2025 1200 by Preston Costello RN  Infection Prevention: equipment surfaces disinfected  Taken 1/2/2025 0800 by  Trang, Preston, RN  Infection Prevention: equipment surfaces disinfected  Goal: Optimal Comfort and Wellbeing  Outcome: Progressing  Intervention: Provide Person-Centered Care  Recent Flowsheet Documentation  Taken 1/2/2025 1600 by Preston Costello RN  Trust Relationship/Rapport:   care explained   choices provided   emotional support provided   reassurance provided  Taken 1/2/2025 1200 by Preston Costello RN  Trust Relationship/Rapport:   care explained   choices provided   emotional support provided   reassurance provided   empathic listening provided   questions answered   questions encouraged   thoughts/feelings acknowledged  Taken 1/2/2025 0800 by Preston Costello RN  Trust Relationship/Rapport:   care explained   choices provided   emotional support provided   reassurance provided  Goal: Readiness for Transition of Care  Outcome: Progressing  Goal: Plan of Care Review  Description: The Plan of Care Review/Shift note should be completed every shift.  The Outcome Evaluation is a brief statement about your assessment that the patient is improving, declining, or no change.  This information will be displayed automatically on your shift  note.  Outcome: Progressing     Problem: Stroke, Ischemic (Includes Transient Ischemic Attack)  Goal: Optimal Coping  Outcome: Progressing  Intervention: Support Psychosocial Response to Stroke  Recent Flowsheet Documentation  Taken 1/2/2025 1600 by Preston Costello RN  Supportive Measures:   positive reinforcement provided   relaxation techniques promoted  Family/Support System Care:   caregiver stress acknowledged   involvement promoted   presence promoted   self-care encouraged   support provided  Taken 1/2/2025 1200 by Preston Costello RN  Supportive Measures:   positive reinforcement provided   relaxation techniques promoted  Family/Support System Care:   caregiver stress acknowledged   involvement promoted   self-care encouraged   presence promoted   support provided  Taken 1/2/2025  0800 by Preston Costello RN  Supportive Measures:   positive reinforcement provided   relaxation techniques promoted  Goal: Effective Bowel Elimination  Outcome: Progressing  Goal: Optimal Cerebral Tissue Perfusion  Outcome: Progressing  Intervention: Protect and Optimize Cerebral Perfusion  Recent Flowsheet Documentation  Taken 1/2/2025 1600 by Preston Costello RN  Sensory Stimulation Regulation:   auditory stimulation minimized   care clustered   quiet environment promoted  Fluid/Electrolyte Management: fluids provided  Taken 1/2/2025 1200 by Preston Costello RN  Fluid/Electrolyte Management: fluids provided  Taken 1/2/2025 0800 by Preston Costello RN  Fluid/Electrolyte Management: fluids provided  Goal: Improved Communication Skills  Outcome: Progressing  Intervention: Optimize Communication Skills  Recent Flowsheet Documentation  Taken 1/2/2025 1600 by Preston Costello RN  Communication Enhancement Strategies:   call light answered in person   communication board used   extra time allowed for response   family involved in communication plan   nonverbal strategies used   one-step directions provided   repetition utilized   verbal and visual cues paired  Goal: Optimal Functional Ability  Outcome: Progressing  Intervention: Optimize Functional Ability  Recent Flowsheet Documentation  Taken 1/2/2025 1800 by Preston Costello RN  Activity Management: bedrest  Taken 1/2/2025 1600 by Preston Costello RN  Activity Management: bedrest  Taken 1/2/2025 1400 by Preston Costello RN  Activity Management: bedrest  Taken 1/2/2025 1200 by Preston Costello RN  Activity Management: bedrest  Taken 1/2/2025 1000 by Preston Costello RN  Activity Management: bedrest  Taken 1/2/2025 0800 by Preston Costello RN  Activity Management: bedrest  Goal: Optimal Nutrition Intake  Outcome: Progressing  Goal: Effective Oxygenation and Ventilation  Outcome: Progressing  Intervention: Optimize Oxygenation and Ventilation  Recent Flowsheet Documentation  Taken 1/2/2025  1800 by Preston Costello RN  Head of Bed (HOB) Positioning: HOB at 30 degrees  Taken 1/2/2025 1600 by Preston Costello RN  Head of Bed (HOB) Positioning: HOB at 30 degrees  Taken 1/2/2025 1400 by Preston Costello RN  Head of Bed (HOB) Positioning: HOB at 30 degrees  Taken 1/2/2025 1200 by Preston Costello RN  Head of Bed (HOB) Positioning: HOB at 30 degrees  Taken 1/2/2025 1000 by Preston Costello RN  Head of Bed (HOB) Positioning: HOB at 30 degrees  Taken 1/2/2025 0800 by Preston Costello RN  Head of Bed (HOB) Positioning: HOB at 30 degrees  Goal: Improved Sensorimotor Function  Outcome: Progressing  Intervention: Optimize Range of Motion, Motor Control and Function  Recent Flowsheet Documentation  Taken 1/2/2025 1800 by Preston Costello RN  Positioning/Transfer Devices:   pillows   wedge   in use  Taken 1/2/2025 1600 by Preston Costello RN  Range of Motion:   ROM (range of motion) performed   active ROM (range of motion) encouraged  Positioning/Transfer Devices:   pillows   wedge   in use  Taken 1/2/2025 1400 by Preston Costello RN  Positioning/Transfer Devices:   pillows   wedge   in use  Taken 1/2/2025 1200 by Preston Costello RN  Range of Motion:   ROM (range of motion) performed   active ROM (range of motion) encouraged  Positioning/Transfer Devices:   pillows   wedge   in use  Taken 1/2/2025 1000 by Preston Costello RN  Positioning/Transfer Devices:   pillows   wedge   in use  Taken 1/2/2025 0800 by Preston Costello RN  Range of Motion:   ROM (range of motion) performed   active ROM (range of motion) encouraged  Positioning/Transfer Devices:   pillows   in use  Intervention: Optimize Sensory and Perceptual Ability  Recent Flowsheet Documentation  Taken 1/2/2025 1600 by Preston Costello RN  Pressure Reduction Techniques: heels elevated off bed  Pressure Reduction Devices: heel offloading device utilized  Taken 1/2/2025 1200 by Preston Costello RN  Pressure Reduction Techniques: heels elevated off bed  Pressure Reduction Devices: heel  offloading device utilized  Taken 1/2/2025 0800 by Preston Costello RN  Pressure Reduction Techniques: heels elevated off bed  Pressure Reduction Devices: heel offloading device utilized  Goal: Safe and Effective Swallow  Outcome: Progressing  Intervention: Promote and Optimize Fluid and Food Intake  Recent Flowsheet Documentation  Taken 1/2/2025 1600 by Preston Costello RN  Aspiration Precautions: oral hygiene care promoted  Feeding/Eating Techniques: feeding assistance provided  Taken 1/2/2025 1200 by Preston Costello RN  Aspiration Precautions: oral hygiene care promoted  Feeding/Eating Techniques: feeding assistance provided  Taken 1/2/2025 0800 by Preston Costello RN  Aspiration Precautions: oral hygiene care promoted  Feeding/Eating Techniques: feeding assistance provided  Goal: Effective Urinary Elimination  Outcome: Progressing  Goal: Optimal Cognitive Function  Outcome: Progressing  Intervention: Optimize Cognitive Function  Recent Flowsheet Documentation  Taken 1/2/2025 1600 by Preston Costello RN  Sensory Stimulation Regulation:   auditory stimulation minimized   care clustered   quiet environment promoted  Reorientation Measures:   calendar in view   clock in view   familiar social contact encouraged   reorientation provided  Environment Familiarity/Consistency: daily routine followed  Taken 1/2/2025 1200 by Preston Costello RN  Environment Familiarity/Consistency: daily routine followed  Taken 1/2/2025 0800 by Preston Costello RN  Environment Familiarity/Consistency: daily routine followed     Problem: Mechanical Ventilation Invasive  Goal: Effective Communication  Outcome: Progressing  Intervention: Ensure Effective Communication  Recent Flowsheet Documentation  Taken 1/2/2025 1600 by Preston Costello RN  Communication Enhancement Strategies:   call light answered in person   communication board used   extra time allowed for response   family involved in communication plan   nonverbal strategies used   one-step  directions provided   repetition utilized   verbal and visual cues paired  Family/Support System Care:   caregiver stress acknowledged   involvement promoted   presence promoted   self-care encouraged   support provided  Trust Relationship/Rapport:   care explained   choices provided   emotional support provided   reassurance provided  Taken 1/2/2025 1200 by Preston Costello RN  Family/Support System Care:   caregiver stress acknowledged   involvement promoted   self-care encouraged   presence promoted   support provided  Trust Relationship/Rapport:   care explained   choices provided   emotional support provided   reassurance provided   empathic listening provided   questions answered   questions encouraged   thoughts/feelings acknowledged  Taken 1/2/2025 0800 by Preston Costello RN  Trust Relationship/Rapport:   care explained   choices provided   emotional support provided   reassurance provided  Goal: Optimal Device Function  Outcome: Progressing  Intervention: Optimize Device Care and Function  Recent Flowsheet Documentation  Taken 1/2/2025 1800 by Preston Costello RN  Oral Care:   suction provided   swabbed with antiseptic solution   lip/mouth moisturizer applied  Taken 1/2/2025 1600 by Preston Costello RN  Oral Care:   suction provided   swabbed with antiseptic solution  Taken 1/2/2025 1400 by Preston Costello RN  Oral Care:   suction provided   swabbed with antiseptic solution  Taken 1/2/2025 1200 by Preston Costello RN  Oral Care:   suction provided   swabbed with antiseptic solution  Taken 1/2/2025 1000 by Preston Costello RN  Oral Care:   lip/mouth moisturizer applied   oral rinse provided   suction provided  Taken 1/2/2025 0800 by Preston Costello RN  Oral Care:   suction provided   swabbed with antiseptic solution   lip/mouth moisturizer applied  Goal: Optimal Nutrition Delivery  Outcome: Progressing  Intervention: Optimize Nutrition Delivery  Recent Flowsheet Documentation  Taken 1/2/2025 1600 by Preston Costello  RN  Nutrition Support Management: weight trending reviewed  Taken 1/2/2025 1200 by Preston Costello RN  Nutrition Support Management: weight trending reviewed  Taken 1/2/2025 0800 by Preston Costello RN  Nutrition Support Management: weight trending reviewed  Goal: Absence of Device-Related Skin and Tissue Injury  Outcome: Progressing  Intervention: Maintain Skin and Tissue Health  Recent Flowsheet Documentation  Taken 1/2/2025 1600 by Preston Costello RN  Device Skin Pressure Protection:   absorbent pad utilized/changed   adhesive use limited   positioning supports utilized   pressure points protected   tubing/devices free from skin contact  Taken 1/2/2025 1200 by Preston Costello RN  Device Skin Pressure Protection:   absorbent pad utilized/changed   adhesive use limited   positioning supports utilized   pressure points protected   tubing/devices free from skin contact  Taken 1/2/2025 0800 by Preston Costello RN  Device Skin Pressure Protection:   absorbent pad utilized/changed   adhesive use limited   positioning supports utilized   pressure points protected   tubing/devices free from skin contact  Goal: Absence of Ventilator-Induced Lung Injury  Outcome: Progressing  Intervention: Prevent Ventilator-Associated Pneumonia  Recent Flowsheet Documentation  Taken 1/2/2025 1800 by Preston Costello RN  Oral Care:   suction provided   swabbed with antiseptic solution   lip/mouth moisturizer applied  Head of Bed (HOB) Positioning: HOB at 30 degrees  Taken 1/2/2025 1600 by Preston Costello RN  VAP Prevention Bundle:   HOB elevation maintained   oral care regularly provided   stress ulcer prophylaxis provided   VTE prophylaxis provided   vent circuit breaks minimized  Oral Care:   suction provided   swabbed with antiseptic solution  Head of Bed (HOB) Positioning: HOB at 30 degrees  VAP Prevention Measures: completed  Taken 1/2/2025 1400 by Preston Costello RN  Oral Care:   suction provided   swabbed with antiseptic solution  Head of Bed  (HOB) Positioning: HOB at 30 degrees  Taken 1/2/2025 1200 by Preston Costello RN  VAP Prevention Bundle:   HOB elevation maintained   oral care regularly provided   stress ulcer prophylaxis provided   VTE prophylaxis provided   vent circuit breaks minimized  Oral Care:   suction provided   swabbed with antiseptic solution  Head of Bed (HOB) Positioning: HOB at 30 degrees  VAP Prevention Measures: completed  Taken 1/2/2025 1000 by Preston Costello RN  Oral Care:   lip/mouth moisturizer applied   oral rinse provided   suction provided  Head of Bed (HOB) Positioning: HOB at 30 degrees  Taken 1/2/2025 0800 by Preston Costello RN  VAP Prevention Bundle:   HOB elevation maintained   oral care regularly provided   stress ulcer prophylaxis provided   VTE prophylaxis provided   vent circuit breaks minimized  Oral Care:   suction provided   swabbed with antiseptic solution   lip/mouth moisturizer applied  Head of Bed (HOB) Positioning: HOB at 30 degrees  VAP Prevention Measures: completed     Problem: Fall Injury Risk  Goal: Absence of Fall and Fall-Related Injury  Outcome: Progressing  Intervention: Identify and Manage Contributors  Recent Flowsheet Documentation  Taken 1/2/2025 1600 by Preston Costello RN  Medication Review/Management: medications reviewed  Taken 1/2/2025 1200 by Preston Costello RN  Medication Review/Management: medications reviewed  Taken 1/2/2025 0800 by Preston Costello RN  Medication Review/Management: medications reviewed  Intervention: Promote Injury-Free Environment  Recent Flowsheet Documentation  Taken 1/2/2025 1600 by Preston Costello RN  Safety Promotion/Fall Prevention:   activity supervised   clutter free environment maintained   nonskid shoes/slippers when out of bed   room organization consistent   safety round/check completed   treat reversible contributory factors   treat underlying cause  Taken 1/2/2025 1200 by Preston Costello RN  Safety Promotion/Fall Prevention:   activity supervised   clutter free  environment maintained   nonskid shoes/slippers when out of bed   room organization consistent   safety round/check completed   treat reversible contributory factors   treat underlying cause  Taken 1/2/2025 0800 by Preston Costello RN  Safety Promotion/Fall Prevention:   activity supervised   clutter free environment maintained   nonskid shoes/slippers when out of bed   room organization consistent   safety round/check completed   treat reversible contributory factors   treat underlying cause     Problem: Restraint, Nonviolent  Goal: Absence of Harm or Injury  Outcome: Progressing  Intervention: Implement Least Restrictive Safety Strategies  Recent Flowsheet Documentation  Taken 1/2/2025 1600 by Preston Costello RN  Medical Device Protection:   IV pole/bag removed from visual field   tubing secured  Taken 1/2/2025 1200 by Preston Costello RN  Medical Device Protection:   IV pole/bag removed from visual field   tubing secured  Taken 1/2/2025 0800 by Preston Costello RN  Medical Device Protection:   IV pole/bag removed from visual field   tubing secured  Intervention: Protect Dignity, Rights and Personal Wellbeing  Recent Flowsheet Documentation  Taken 1/2/2025 1600 by Preston Costello RN  Trust Relationship/Rapport:   care explained   choices provided   emotional support provided   reassurance provided  Taken 1/2/2025 1200 by Preston Costello RN  Trust Relationship/Rapport:   care explained   choices provided   emotional support provided   reassurance provided   empathic listening provided   questions answered   questions encouraged   thoughts/feelings acknowledged  Taken 1/2/2025 0800 by Preston Costello RN  Trust Relationship/Rapport:   care explained   choices provided   emotional support provided   reassurance provided  Intervention: Protect Skin and Joint Integrity  Recent Flowsheet Documentation  Taken 1/2/2025 1800 by Preston Costello RN  Body Position:   turned   right   heels elevated   upper extremity elevated  Taken  1/2/2025 1600 by Preston Costello RN  Body Position:   turned   left   heels elevated   upper extremity elevated  Skin Protection:   adhesive use limited   incontinence pads utilized   silicone foam dressing in place  Range of Motion:   ROM (range of motion) performed   active ROM (range of motion) encouraged  Taken 1/2/2025 1400 by Preston Costello RN  Body Position:   turned   right   heels elevated   upper extremity elevated  Taken 1/2/2025 1200 by Preston Costello RN  Body Position:   turned   left   heels elevated   upper extremity elevated  Skin Protection:   adhesive use limited   incontinence pads utilized   silicone foam dressing in place  Range of Motion:   ROM (range of motion) performed   active ROM (range of motion) encouraged  Taken 1/2/2025 1000 by Preston Costello RN  Body Position:   turned   right   heels elevated   upper extremity elevated  Taken 1/2/2025 0800 by Preston Costello RN  Body Position:   turned   left   heels elevated   upper extremity elevated  Skin Protection:   adhesive use limited   incontinence pads utilized   silicone foam dressing in place  Range of Motion:   ROM (range of motion) performed   active ROM (range of motion) encouraged

## 2025-01-03 NOTE — PROGRESS NOTES
Brief ICU Note:   I evaluated the patient at the bedside.  He has significant airway secretions and he is unable to effectively swallow his secretions and also not able to effectively cough them up. He has difficulty with phonation and he is requiring very frequent NT suctioning.  When I attempted oral suctioning, I inserted the younker to the back of the oropharyngeal cavity and the patient did not have significant gag reflex.  I am very concerned that the patient is unable to protect his airway and I recommended ETT placement.  The patient was resistant at the beginning but when I talked about the alternative comfort care pathway the patient indicated that he does not want comfort care approach at this point.  Eventually the patient did not decline the intubation and the family was supportive of this decision.      -We intubated the patient using RSI meds including Rocuronium and Etomidate (See the separate procedure note)  -Sedate with precedex   -Continue ongoing goals of care discussion         Critical Care Time: Additional 30 min (in addition to the critical care time spent by Dr. Zana Shay).  I spent this time (excluding procedures) personally providing and directing critical care services at the bedside and on the critical care unit.     Cheryl Faulkner MD   Pulmonary and Critical Care

## 2025-01-03 NOTE — PROGRESS NOTES
[facility] ICU RESPIRATORY NOTE        Date of Admission: 12/25/2024    Date of Intubation (most recent): 12/29/24    Reason for Mechanical Ventilation: Aw protection    Number of Days on Mechanical Ventilation: 5    Met Criteria for Spontaneous Breathing Trial: No    Reason for No Spontaneous Breathing Trial: Per MD    Bite Block: No    Significant Events Today: None    ABG Results:   Recent Labs   Lab 01/01/25  2116 12/30/24  0455   PH  --  7.37   PCO2  --  36   PO2  --  97   HCO3  --  21   O2PER 30 30         Current Vent Settings: FiO2 (%): 30 %, Resp: 22, Inspiratory Pressure (cm H2O) (Drager Stefany): 15, Vent Mode: PCV Plus assist, Resp Rate (Set): 18 breaths/min, Tidal Volume (Set, mL): 500 mL, PEEP (cm H2O): 5 cmH2O, Pressure Support (cm H2O): 15 cmH2O, Resp Rate (Set): 18 breaths/min, Tidal Volume (Set, mL): 500 mL, PEEP (cm H2O): 5 cmH2O    Skin Assessment: Intact    Plan: continue to monitor    RT Kathryn on 1/3/2025 at 5:24 AM

## 2025-01-03 NOTE — CONSULTS
Palliative Care Consultation Note  [facility]      Patient: Elio Aguilar  Date of Admission:  12/25/2024    Requesting Clinician / Team: ICU  Reason for consult:   Goals of care  Patient and family support     Recommendations & Counseling     GOALS OF CARE:   Life-prolonging without limits   Elio and family are hopeful for recovery and are interested in attempting rehabilitation.  He would assent to reintubation, tracheostomy, and PEG placement in order to attempt rehabilitation if needed.    See below for greater details.     ADVANCE CARE PLANNING:  No health care directive on file. Per system policy, Surrogate Decision-makers for Patients With Diminished Decision-making Capacity offers guidance on possible decision-makers. Martine has been identified as a surrogate decision maker.   There is no POLST form on file, defer to patient and/or next of kin for decisions   Code status: Full Code    MEDICAL MANAGEMENT:   We are not actively managing symptoms at this time.    PSYCHOSOCIAL/SPIRITUAL SUPPORT:  Family   Tracee community: No Zoroastrianism     Palliative Care will continue to follow. Thank you for the consult and allowing us to aid in the care of Elio Aguilar.    These recommendations have been discussed with bedside and primary team.    Calixto Whitmore NP  [facility], Palliative Care  Securely message with the [employer] Web Console (learn more here) or  Text page via [employer] Paging/Directory     Chart documentation was completed, in part, with [employer] voice-recognition software. Even though reviewed, some grammatical, spelling, and word errors may remain.    Assessment      Elio Aguilar is a 79 year old male with a past medical history of  HTN, HLD, ANGEL, Reye's Syndrome  who  to the emergency department 12/25/2024 with lightheadedness, diaphoresis, dysarthria x 5 minutes. Imaging showed severe basilar stenosis/occlusion .  He was initially started on DAPT. Initial MRI on 12/26 was  negative for acute pathology and repeat CTA 12/27 showed some improvement in basilar narrowing. He developed  new dysarthria 12/28 and was found to have a  probable small pontine infarct.  MRA unchanged.     12/29 stroke code activated for progressive worsening of symptoms, dysarthria, hypophonia as well as new right arm/leg ataxia and right hemiparesis.  Underwent emergent diagnostic cerebral angiogram and  angioplasty for severe multifocal basilar artery stenosis. Hew was intubated for airway protection.          History of Present Illness   Met with Elio, his wife, two children, sister(AMADOR), and grandchildren  .   Introduced the role of palliative care as an interdisciplinary team that cares for patients with serious illness to help support symptom management, communication, coping for patients and their families as well as support with medical decision making.    Prognosis, Goals, & Planning:   Functional Status just prior to this current hospitalization:  Outpatient Palliative Performance Score (PPS) 100% No evidence of disease. Full/normal ambulation, activity/work, self-care, oral intake, & LOC.      Prognosis, Goals, and/or Advance Care Planning:  We discussed general treatment options (full/restorative, selective/conservatives, and comfort only/hospice). We then discussed how these may apply to Elio.    Family has an excellent understanding of Elio's diagnosis and potential deficits. They are aware that he is susceptible to recurrent strokes, and/or other complications that are associated with chronic illness.    We reviewed rationale for intubation and need for MV as it relates to airway protection. Education provided on concerns for loss of protective airway reflexes, dysphagia, aspiration risk, on the possible need for artificial nutrition.  Shared that his aspiration risk and chances of pneumonia will not change with artificial nutrition via PEG and he will continue to be at significant  aspiration risk.    Discussed that if he is unable to maintain his airway or has significant issues with swallowing or managing his secretions he may need to be reintubated and would likely need to pursue a tracheostomy and a PEG  Family is aware that they may be some recovery from this stroke but this could take weeks to months, and they are aware that he may not regain full function, the ability to ambulate, or return to independent living.   Discussed what continuing restorative/life-prolonging care entails, including extubation, recovery in the hospital if able to  medical treatment in hopes to prolong life for as long as possible.  Shared that with any treatment, there will be risks and benefits that may affect overall quality of life.  Explained where continued care can be done based on his level of need (home care, private pay, skilled nursing facilities, LTACH as options).  Detailed discussion provided on TCU versus LTAC and what this may look like for him   Education provided on the role of palliative care while continuing with disease treatments (support/coping, symptom management).  We discussed that if/when disease treatments are no longer effective or when their quality of life is too negatively impacted, patient may elect to focus more solely on quality-of-life measures and stop disease treatment.    Education provided on transition to comfort-focused goals of care. Discussed that this process is very purposeful in terms of ensuring patient is as comfortable as possible and that family wishes are honored.  Shared that at any point throughout his medical journey he has the right to transition to comfort care and enrollment in hospice.    Code Status was addressed today:   Yes, We discussed potential risks and rationale of attempting cardiac resuscitation, intubation, and mechanical ventilation.  We also discussed probability of survival as well as quality of life implications.  Based on this discussion,  patient or surrogate response/decision: Continue full code            Coping, Meaning, & Spirituality:   Mood, coping, and/or meaning in the context of serious illness were addressed today: Yes    Social:   Living situation:lives with significant other  Areas of fulfillment/chino: Prior to his acute illnesses he enjoyed spending time working on classic cars.  He enjoys flying has his 's license.  He and his wife enjoy seeing the country in an RV.  They live in [place] but are in [place] spending time with family for the holidays.    Medications:  Reviewed this patient's medication profile and medications from this hospitalization.       ROS:  Comprehensive ROS is reviewed and is negative except as here & per HPI:     Physical Exam   Vital Signs with Ranges  Temp:  [97.5  F (36.4  C)-100.6  F (38.1  C)] 99.3  F (37.4  C)  Pulse:  [] 74  Resp:  [13-26] 20  BP: (118-170)/(55-90) 156/76  FiO2 (%):  [30 %] 30 %  SpO2:  [93 %-98 %] 96 %  Wt Readings from Last 10 Encounters:   01/03/25 90.9 kg (200 lb 6.4 oz)     200 lbs 6.37 oz    Physical Exam  Vitals and nursing note reviewed.   Constitutional:       Interventions: He is sedated and intubated.   HENT:      Mouth/Throat:      Mouth: Mucous membranes are dry.   Eyes:      Conjunctiva/sclera: Conjunctivae normal.      Pupils: Pupils are equal, round, and reactive to light.   Cardiovascular:      Rate and Rhythm: Normal rate and regular rhythm.      Pulses: Normal pulses.   Pulmonary:      Effort: He is intubated.      Breath sounds: Decreased breath sounds present. No wheezing.   Abdominal:      General: There is no distension.      Tenderness: There is no abdominal tenderness.   Musculoskeletal:         General: No tenderness or deformity.   Skin:     General: Skin is warm and dry.      Capillary Refill: Capillary refill takes less than 2 seconds.   Neurological:      Motor: Weakness present.   Psychiatric:         Mood and Affect: Mood normal.       Data  reviewed:  Results for orders placed or performed during the hospital encounter of 12/25/24 (from the past 24 hours)   EKG 12-lead, tracing only   Result Value Ref Range    Systolic Blood Pressure  mmHg    Diastolic Blood Pressure  mmHg    Ventricular Rate 90 BPM    Atrial Rate 90 BPM    OH Interval 134 ms    QRS Duration 82 ms     ms    QTc 433 ms    P Axis 64 degrees    R AXIS -1 degrees    T Axis 259 degrees    Interpretation ECG       Sinus rhythm with Premature supraventricular complexes  Nonspecific ST and T wave abnormality  Abnormal ECG  When compared with ECG of 02-Jan-2025 08:10, (unconfirmed)  Premature supraventricular complexes are now Present  Confirmed by MD POP MICHAEL ([id number]) on 1/2/2025 4:57:42 PM     Glucose by meter   Result Value Ref Range    GLUCOSE BY METER POCT 170 (H) 70 - 99 mg/dL   Glucose by meter   Result Value Ref Range    GLUCOSE BY METER POCT 156 (H) 70 - 99 mg/dL   Glucose by meter   Result Value Ref Range    GLUCOSE BY METER POCT 173 (H) 70 - 99 mg/dL   CBC with Platelets & Differential    Narrative    The following orders were created for panel order CBC with Platelets & Differential.  Procedure                               Abnormality         Status                     ---------                               -----------         ------                     CBC with platelets and d...[[id number]]  Abnormal            Final result                 Please view results for these tests on the individual orders.   Phosphorus   Result Value Ref Range    Phosphorus 3.2 2.5 - 4.5 mg/dL   Potassium   Result Value Ref Range    Potassium 3.5 3.4 - 5.3 mmol/L   Magnesium   Result Value Ref Range    Magnesium 2.5 (H) 1.7 - 2.3 mg/dL   CBC with platelets and differential   Result Value Ref Range    WBC Count 8.3 4.0 - 11.0 10e3/uL    RBC Count 3.91 (L) 4.40 - 5.90 10e6/uL    Hemoglobin 12.6 (L) 13.3 - 17.7 g/dL    Hematocrit 35.8 (L) 40.0 - 53.0 %    MCV 92 78 - 100 fL    MCH 32.2 26.5 -  33.0 pg    MCHC 35.2 31.5 - 36.5 g/dL    RDW 12.9 10.0 - 15.0 %    Platelet Count 175 150 - 450 10e3/uL    % Neutrophils 77 %    % Lymphocytes 9 %    % Monocytes 10 %    % Eosinophils 2 %    % Basophils 1 %    % Immature Granulocytes 2 %    NRBCs per 100 WBC 0 <1 /100    Absolute Neutrophils 6.4 1.6 - 8.3 10e3/uL    Absolute Lymphocytes 0.7 (L) 0.8 - 5.3 10e3/uL    Absolute Monocytes 0.9 0.0 - 1.3 10e3/uL    Absolute Eosinophils 0.1 0.0 - 0.7 10e3/uL    Absolute Basophils 0.1 0.0 - 0.2 10e3/uL    Absolute Immature Granulocytes 0.1 <=0.4 10e3/uL    Absolute NRBCs 0.0 10e3/uL   Basic metabolic panel   Result Value Ref Range    Sodium 145 135 - 145 mmol/L    Potassium 3.6 3.4 - 5.3 mmol/L    Chloride 113 (H) 98 - 107 mmol/L    Carbon Dioxide (CO2) 19 (L) 22 - 29 mmol/L    Anion Gap 13 7 - 15 mmol/L    Urea Nitrogen 35.7 (H) 8.0 - 23.0 mg/dL    Creatinine 1.34 (H) 0.67 - 1.17 mg/dL    GFR Estimate 53 (L) >60 mL/min/1.73m2    Calcium 8.7 (L) 8.8 - 10.4 mg/dL    Glucose 176 (H) 70 - 99 mg/dL   Glucose by meter   Result Value Ref Range    GLUCOSE BY METER POCT 164 (H) 70 - 99 mg/dL       Medical Decision Making       96 MINUTES SPENT BY ME on the date of service doing chart review, history, exam, documentation & further activities per the note.

## 2025-01-03 NOTE — PROGRESS NOTES
ICU Progress Note   Date of Service: 01/03/25    Assessment and Plan:  This is an 79 year old man admitted to the ICU for a posterior CVA for which he underwent balloon angioplasty. The patient was admitted to the ICU for airway protection, ventilatory management following the procedure.       Interval events:   Coughed up some blood clots last night and was noted to have blood tinged secretions, so subQ heparin was stopped. Also noted to have a drop in his H/H from 16 to 12.  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   - Repeat MRI 12/30 showing pontine stroke   - CTA 1/1 showed basilar artery stenosis with slightly better flow than before  - Remains at high risk for recurrent stroke, up to 25% /yr per neurology  - discontinue sedation in preparation for extubation  - Atorvastatin 80 mg Q pm     Cardiac:   # Hypotension (resolved)  # HTN  - Goal -160 for the acute phase  - PRN labetalol     Pulm:    # Acute hypoxic respiratory failure  - Continue vent support until patient is more awake  - Vent setting PC/AC 15/5/30% RR18, he was breathing in low 20s  - SBT and extubation if he passes  - If he fails extubation trial then will re-intubate and plan for a trach early next week    GI:   - S/p NGT placement  - Hold tube feeds for planned extubation  - Pantoprazole for GI proph     # Diarrhea  - Negative stool pathogen panel  - Immodium PRN  - Rectal tube in place for fecal management     :   # CKD stage 3: stable cr, monitor  - External catheter in place.     #  Hypokalemia/hypomagnesemia/hypophosphatemia  - replace     ID:  # VAP  - Patient had fever and pulmonary infiltrate  - Sputum culture with normal tom, MRSA swab negative  - On empiric pip-tazo 12/30- , will plan for a total of 7 days EOT 1/5    HEME:   # acute blood loss anemia  - Patient is on brilinta BID  - Noted to have blood clots in his resp secretions so hep s/q was stopped  - H/H dropped to 12 but has been stable since last night  - Continue to monitor H/H     Endocrine:  - Goal glucose < 180     CODE: Full Code      PPx:  1. DVT: SCDs  2. VAP: HOB 30 degrees, chlorhexidine rinse  3. Stress Ulcer: PPI  4. Restraints: Nonviolent soft upper extremity restraints required and necessary for patient safety and continued cares and good effect as patient continues to pull at necessary lines, tubes despite education and distraction. Will readdress daily.   5. Wound care - per unit routine   6. Feeding - TF  7. Family updated at the bedside     Dispo: ICU      BP (!) 167/68   Pulse 74   Temp 99  F (37.2  C)   Resp 19   Ht 1.829 m (6')   Wt 90.9 kg (200 lb 6.4 oz)   SpO2 95%   BMI 27.18 kg/m      FiO2 (%): 30 %, Resp: 19, Inspiratory Pressure (cm H2O) (Drager Stefany): 15, Vent Mode: PCV Plus assist, Resp Rate (Set): 18 breaths/min, Tidal Volume (Set, mL): 500 mL, PEEP (cm H2O): 5 cmH2O, Pressure Support (cm H2O): 15 cmH2O, Resp Rate (Set): 18 breaths/min, Tidal Volume (Set, mL): 500 mL, PEEP (cm H2O): 5 cmH2O      Intake/Output Summary (Last 24 hours) at 1/3/2025 1120  Last data filed at 1/3/2025 1000  Gross per 24 hour   Intake 2149.43 ml   Output 1675 ml   Net 474.43 ml       Physical Exam:  Gen: Laying in bed in NAD  HEENT: NC AT  Resp: intubated, B/L air entry, no wheezing or rhonchi  CVS: S1 S2, regular rhythm on tele  Abd: Soft NT ND  Ext: no swelling noted  Neuro: opens eyes spontaneously, follows commands on the right upper ext and left foot to some extent.     Labs: reviewed    Imaging:  reviewed    Time Spent on this Encounter   Elio was seen and evaluated by me on 01/03/25.  He was in critical condition as the result of acute stroke.    His condition is now Serious.      The acute issues managed by me today include  acute hypoxic respiratory failure, acute CVA, and acute blood loss anemia.     Supportive interventions provided and/or ordered by me include mechanical ventilation, SBT and plant for extubation today, and monitoring CBC.     Total Critical Care time spent by me, excluding procedures, was 40 minutes.    Zana Shay MD

## 2025-01-03 NOTE — PROGRESS NOTES
Intubation Note    Date of intubation: 1/3/24  Time of intubation: 1600  Location of intubation: ICU  Intubated by: MD  Size of ETT: 7.5  Location (cm) at lip: 26    ETT placement confirmed by: BS, color change.  Comments: Continue full vent support and assess for weaning daily.    Tej Newby, RRT

## 2025-01-03 NOTE — PLAN OF CARE
Goal Outcome Evaluation:    No change.    Remains intubated; no changes made to vent o/n. Started on dex (see MAR) d/t anxiety. Meeting RASS goal 0/-1. Currently denies pain and anxiety. Neuro exam stable throughout the night with no changes. Follows basic commands. Diminished lung sounds throughout; intermittently coarse; crackles in bilateral lower lobes. Continues to have thick tan/brown ett secretions and thick clear oral secretions. Start of shift, suctioned a few quarter sized zhao/red clots from ETT. Sinus rhythm with occasional to frequent ectopy. Straight cath x 1 for 550mL. Rectal tube placed with 500mL out o/n. Palliative consult planned for today.      Problem: Adult Inpatient Plan of Care  Goal: Absence of Hospital-Acquired Illness or Injury  Intervention: Identify and Manage Fall Risk  Recent Flowsheet Documentation  Taken 1/3/2025 0400 by Matthew Strickland, RN  Safety Promotion/Fall Prevention:   clutter free environment maintained   increased rounding and observation   lighting adjusted   room door open   room near nurse's station   room organization consistent   treat reversible contributory factors   treat underlying cause   safety round/check completed  Taken 1/3/2025 0000 by Matthew Strickland, RN  Safety Promotion/Fall Prevention:   clutter free environment maintained   increased rounding and observation   lighting adjusted   room door open   room near nurse's station   room organization consistent   treat reversible contributory factors   treat underlying cause   safety round/check completed  Taken 1/2/2025 2000 by Matthew Strickland, RN  Safety Promotion/Fall Prevention:   clutter free environment maintained   increased rounding and observation   lighting adjusted   room door open   room near nurse's station   room organization consistent   treat reversible contributory factors   treat underlying cause   safety round/check completed  Intervention: Prevent Skin Injury  Recent Flowsheet  Documentation  Taken 1/3/2025 0600 by Matthew Strickland RN  Body Position:   turned   heels elevated   legs elevated   side-lying   upper extremity elevated  Taken 1/3/2025 0400 by Matthew Strickland RN  Body Position:   turned   heels elevated   legs elevated   side-lying   upper extremity elevated  Skin Protection:   adhesive use limited   incontinence pads utilized   silicone foam dressing in place   transparent dressing maintained  Taken 1/3/2025 0000 by Matthew Strickland RN  Body Position:   turned   heels elevated   legs elevated   side-lying   upper extremity elevated  Skin Protection:   adhesive use limited   incontinence pads utilized   silicone foam dressing in place   transparent dressing maintained  Taken 1/2/2025 2200 by Matthew Strickland RN  Body Position:   turned   right   heels elevated   upper extremity elevated  Taken 1/2/2025 2000 by Matthew Strickland RN  Body Position:   turned   heels elevated   legs elevated   side-lying   upper extremity elevated  Skin Protection:   adhesive use limited   incontinence pads utilized   silicone foam dressing in place   transparent dressing maintained  Intervention: Prevent and Manage VTE (Venous Thromboembolism) Risk  Recent Flowsheet Documentation  Taken 1/3/2025 0400 by Matthew Strickland RN  VTE Prevention/Management: SCDs on (sequential compression devices)  Taken 1/3/2025 0000 by Matthew Strickland RN  VTE Prevention/Management: SCDs on (sequential compression devices)  Taken 1/2/2025 2000 by Matthew Strickland RN  VTE Prevention/Management: SCDs on (sequential compression devices)  Intervention: Prevent Infection  Recent Flowsheet Documentation  Taken 1/3/2025 0400 by Matthew Strickland RN  Infection Prevention:   environmental surveillance performed   rest/sleep promoted   single patient room provided  Taken 1/3/2025 0000 by Matthew Strickland RN  Infection Prevention:   environmental surveillance performed   rest/sleep promoted   single patient room provided  Taken 1/2/2025 2000  by Matthew Strickland RN  Infection Prevention:   environmental surveillance performed   rest/sleep promoted   single patient room provided  Goal: Optimal Comfort and Wellbeing  Outcome: Progressing  Intervention: Provide Person-Centered Care  Recent Flowsheet Documentation  Taken 1/3/2025 0400 by Matthew Strickland RN  Trust Relationship/Rapport:   care explained   reassurance provided  Taken 1/3/2025 0000 by Matthew Strickland RN  Trust Relationship/Rapport:   care explained   reassurance provided  Taken 1/2/2025 2000 by Matthew Strickland RN  Trust Relationship/Rapport:   care explained   reassurance provided     Problem: Stroke, Ischemic (Includes Transient Ischemic Attack)  Goal: Optimal Coping  Intervention: Support Psychosocial Response to Stroke  Recent Flowsheet Documentation  Taken 1/3/2025 0400 by Matthew Strickland RN  Supportive Measures:   positive reinforcement provided   relaxation techniques promoted  Taken 1/3/2025 0000 by Matthew Strickland RN  Supportive Measures:   positive reinforcement provided   relaxation techniques promoted  Taken 1/2/2025 2000 by Matthew Strickland RN  Supportive Measures:   positive reinforcement provided   relaxation techniques promoted  Goal: Effective Bowel Elimination  Outcome: Progressing  Goal: Optimal Cerebral Tissue Perfusion  Intervention: Protect and Optimize Cerebral Perfusion  Recent Flowsheet Documentation  Taken 1/3/2025 0400 by Matthew Strickland RN  Sensory Stimulation Regulation:   care clustered   lighting decreased   music on   quiet environment promoted  Cerebral Perfusion Promotion: blood pressure monitored  Fluid/Electrolyte Management: fluids provided  Taken 1/3/2025 0000 by Matthew Strickland RN  Sensory Stimulation Regulation:   care clustered   lighting decreased   music on   quiet environment promoted  Cerebral Perfusion Promotion: blood pressure monitored  Fluid/Electrolyte Management: fluids provided  Taken 1/2/2025 2000 by Matthew Strickland RN  Sensory Stimulation  Regulation:   care clustered   lighting decreased   music on   quiet environment promoted  Cerebral Perfusion Promotion: blood pressure monitored  Fluid/Electrolyte Management: fluids provided  Goal: Optimal Cognitive Function  Intervention: Optimize Cognitive Function  Recent Flowsheet Documentation  Taken 1/3/2025 0400 by Matthew Strickland RN  Sensory Stimulation Regulation:   care clustered   lighting decreased   music on   quiet environment promoted  Reorientation Measures:   calendar in view   clock in view   familiar social contact encouraged   reorientation provided  Environment Familiarity/Consistency: daily routine followed  Taken 1/3/2025 0000 by Matthew Strickland RN  Sensory Stimulation Regulation:   care clustered   lighting decreased   music on   quiet environment promoted  Reorientation Measures:   calendar in view   clock in view   familiar social contact encouraged   reorientation provided  Environment Familiarity/Consistency: daily routine followed  Taken 1/2/2025 2000 by Matthew Strickland RN  Sensory Stimulation Regulation:   care clustered   lighting decreased   music on   quiet environment promoted  Reorientation Measures:   calendar in view   clock in view   familiar social contact encouraged   reorientation provided  Environment Familiarity/Consistency: daily routine followed  Goal: Improved Communication Skills  Outcome: Progressing  Intervention: Optimize Communication Skills  Recent Flowsheet Documentation  Taken 1/3/2025 0400 by Matthew Strickland RN  Communication Enhancement Strategies:   call light answered in person   one-step directions provided   repetition utilized   verbal and visual cues paired   extra time allowed for response  Taken 1/3/2025 0000 by Matthew Strickland RN  Communication Enhancement Strategies:   call light answered in person   one-step directions provided   repetition utilized   verbal and visual cues paired   extra time allowed for response  Taken 1/2/2025 2000 by Em  IZABELA Castro  Communication Enhancement Strategies:   call light answered in person   one-step directions provided   repetition utilized   verbal and visual cues paired   extra time allowed for response  Goal: Optimal Functional Ability  Outcome: Not Progressing  Intervention: Optimize Functional Ability  Recent Flowsheet Documentation  Taken 1/3/2025 0400 by Matthew Strickland RN  Activity Management: bedrest  Taken 1/3/2025 0000 by Matthew Strickland RN  Activity Management: bedrest  Taken 1/2/2025 2000 by Matthew Strickland RN  Activity Management: bedrest  Goal: Optimal Nutrition Intake  Outcome: Progressing  Goal: Effective Oxygenation and Ventilation  Intervention: Optimize Oxygenation and Ventilation  Recent Flowsheet Documentation  Taken 1/3/2025 0600 by Matthew Strickland RN  Head of Bed (HOB) Positioning: HOB at 30 degrees  Taken 1/3/2025 0400 by Matthew Strickland RN  Airway/Ventilation Management:   airway patency maintained   calming measures promoted   pulmonary hygiene promoted  Head of Bed (HOB) Positioning: HOB at 30 degrees  Taken 1/3/2025 0000 by Matthew Strickland RN  Airway/Ventilation Management:   airway patency maintained   calming measures promoted   pulmonary hygiene promoted  Head of Bed (HOB) Positioning: HOB at 30 degrees  Taken 1/2/2025 2200 by Matthew Strickland RN  Head of Bed (HOB) Positioning: HOB at 30 degrees  Taken 1/2/2025 2000 by Matthew Strickland RN  Airway/Ventilation Management:   airway patency maintained   calming measures promoted   pulmonary hygiene promoted  Head of Bed (HOB) Positioning: HOB at 30 degrees  Goal: Improved Sensorimotor Function  Intervention: Optimize Range of Motion, Motor Control and Function  Recent Flowsheet Documentation  Taken 1/3/2025 0600 by Matthew Strickland RN  Positioning/Transfer Devices:   pillows   wedge   in use  Taken 1/3/2025 0400 by Matthew Strickland RN  Range of Motion: ROM (range of motion) performed  Positioning/Transfer Devices:   pillows   wedge   in  use  Taken 1/3/2025 0000 by Matthew Strickland RN  Range of Motion: ROM (range of motion) performed  Positioning/Transfer Devices:   pillows   wedge   in use  Taken 1/2/2025 2200 by Matthew Strickland RN  Positioning/Transfer Devices:   pillows   wedge   in use  Taken 1/2/2025 2000 by Matthew Strickland RN  Range of Motion: ROM (range of motion) performed  Positioning/Transfer Devices:   pillows   wedge   in use  Intervention: Optimize Sensory and Perceptual Ability  Recent Flowsheet Documentation  Taken 1/3/2025 0400 by Matthew Strickland RN  Pressure Reduction Techniques: heels elevated off bed  Pressure Reduction Devices:   positioning supports utilized   specialty bed utilized  Taken 1/3/2025 0000 by Matthew Strickalnd RN  Pressure Reduction Techniques: heels elevated off bed  Pressure Reduction Devices:   positioning supports utilized   specialty bed utilized  Taken 1/2/2025 2000 by Matthew Strickland RN  Pressure Reduction Techniques: heels elevated off bed  Pressure Reduction Devices:   positioning supports utilized   specialty bed utilized  Goal: Safe and Effective Swallow  Outcome: Not Progressing  Intervention: Promote and Optimize Fluid and Food Intake  Recent Flowsheet Documentation  Taken 1/3/2025 0400 by Matthew Strickland RN  Aspiration Precautions:   oral hygiene care promoted   respiratory status monitored  Taken 1/3/2025 0000 by Matthew Strickland RN  Aspiration Precautions:   respiratory status monitored   oral hygiene care promoted  Taken 1/2/2025 2000 by Matthew Strickland RN  Aspiration Precautions: oral hygiene care promoted  Goal: Effective Urinary Elimination  Outcome: Not Progressing  Goal: Optimal Cognitive Function  Outcome: Progressing  Intervention: Optimize Cognitive Function  Recent Flowsheet Documentation  Taken 1/3/2025 0400 by Matthew Strickland RN  Sensory Stimulation Regulation:   care clustered   lighting decreased   music on   quiet environment promoted  Reorientation Measures:   calendar in view   clock in  view   familiar social contact encouraged   reorientation provided  Environment Familiarity/Consistency: daily routine followed  Taken 1/3/2025 0000 by Matthew Strickland RN  Sensory Stimulation Regulation:   care clustered   lighting decreased   music on   quiet environment promoted  Reorientation Measures:   calendar in view   clock in view   familiar social contact encouraged   reorientation provided  Environment Familiarity/Consistency: daily routine followed  Taken 1/2/2025 2000 by Matthew Strickland RN  Sensory Stimulation Regulation:   care clustered   lighting decreased   music on   quiet environment promoted  Reorientation Measures:   calendar in view   clock in view   familiar social contact encouraged   reorientation provided  Environment Familiarity/Consistency: daily routine followed     Problem: Stroke, Ischemic (Includes Transient Ischemic Attack)  Goal: Optimal Functional Ability  Outcome: Not Progressing  Intervention: Optimize Functional Ability  Recent Flowsheet Documentation  Taken 1/3/2025 0400 by Matthew Strickland RN  Activity Management: bedrest  Taken 1/3/2025 0000 by Matthew Strickland RN  Activity Management: bedrest  Taken 1/2/2025 2000 by Matthew Strickland RN  Activity Management: bedrest     Problem: Stroke, Ischemic (Includes Transient Ischemic Attack)  Goal: Optimal Nutrition Intake  Outcome: Progressing     Problem: Stroke, Ischemic (Includes Transient Ischemic Attack)  Goal: Safe and Effective Swallow  Outcome: Not Progressing  Intervention: Promote and Optimize Fluid and Food Intake  Recent Flowsheet Documentation  Taken 1/3/2025 0400 by Matthew Strickland RN  Aspiration Precautions:   oral hygiene care promoted   respiratory status monitored  Taken 1/3/2025 0000 by Matthew Strickland RN  Aspiration Precautions:   respiratory status monitored   oral hygiene care promoted  Taken 1/2/2025 2000 by Matthew Strickland RN  Aspiration Precautions: oral hygiene care promoted     Problem: Stroke, Ischemic  (Includes Transient Ischemic Attack)  Goal: Effective Urinary Elimination  Outcome: Not Progressing     Problem: Stroke, Ischemic (Includes Transient Ischemic Attack)  Goal: Optimal Cognitive Function  Outcome: Progressing  Intervention: Optimize Cognitive Function  Recent Flowsheet Documentation  Taken 1/3/2025 0400 by Matthew Strickland RN  Sensory Stimulation Regulation:   care clustered   lighting decreased   music on   quiet environment promoted  Reorientation Measures:   calendar in view   clock in view   familiar social contact encouraged   reorientation provided  Environment Familiarity/Consistency: daily routine followed  Taken 1/3/2025 0000 by Matthew Strickland RN  Sensory Stimulation Regulation:   care clustered   lighting decreased   music on   quiet environment promoted  Reorientation Measures:   calendar in view   clock in view   familiar social contact encouraged   reorientation provided  Environment Familiarity/Consistency: daily routine followed  Taken 1/2/2025 2000 by Matthew Strickland RN  Sensory Stimulation Regulation:   care clustered   lighting decreased   music on   quiet environment promoted  Reorientation Measures:   calendar in view   clock in view   familiar social contact encouraged   reorientation provided  Environment Familiarity/Consistency: daily routine followed     Problem: Mechanical Ventilation Invasive  Goal: Effective Communication  Outcome: Progressing  Intervention: Ensure Effective Communication  Recent Flowsheet Documentation  Taken 1/3/2025 0400 by Matthew Strickland RN  Communication Enhancement Strategies:   call light answered in person   one-step directions provided   repetition utilized   verbal and visual cues paired   extra time allowed for response  Trust Relationship/Rapport:   care explained   reassurance provided  Taken 1/3/2025 0000 by Matthew Strickland RN  Communication Enhancement Strategies:   call light answered in person   one-step directions provided   repetition  utilized   verbal and visual cues paired   extra time allowed for response  Trust Relationship/Rapport:   care explained   reassurance provided  Taken 1/2/2025 2000 by Matthew Strickland RN  Communication Enhancement Strategies:   call light answered in person   one-step directions provided   repetition utilized   verbal and visual cues paired   extra time allowed for response  Trust Relationship/Rapport:   care explained   reassurance provided  Goal: Optimal Device Function  Outcome: Progressing  Intervention: Optimize Device Care and Function  Recent Flowsheet Documentation  Taken 1/3/2025 0400 by Matthew Strickland RN  Airway/Ventilation Management:   airway patency maintained   calming measures promoted   pulmonary hygiene promoted  Oral Care:   suction provided   swabbed with sterile water  Taken 1/3/2025 0000 by Matthew Strickland, RN  Airway/Ventilation Management:   airway patency maintained   calming measures promoted   pulmonary hygiene promoted  Oral Care:   suction provided   swabbed with sterile water  Taken 1/2/2025 2000 by Matthew Strickland, RN  Airway/Ventilation Management:   airway patency maintained   calming measures promoted   pulmonary hygiene promoted  Oral Care:   swabbed with antiseptic solution   suction provided  Goal: Mechanical Ventilation Liberation  Intervention: Promote Extubation and Mechanical Ventilation Liberation  Recent Flowsheet Documentation  Taken 1/3/2025 0400 by Matthew Strickland, RN  Sleep/Rest Enhancement:   awakenings minimized   comfort measures   music provided   noise level reduced   regular sleep/rest pattern promoted   relaxation techniques promoted   room darkened  Medication Review/Management: medications reviewed  Environmental Support:   calm environment promoted   distractions minimized   environmental consistency promoted   rest periods encouraged  Taken 1/3/2025 0000 by Matthew Strickland, RN  Sleep/Rest Enhancement:   awakenings minimized   comfort measures   music provided    noise level reduced   regular sleep/rest pattern promoted   relaxation techniques promoted   room darkened  Medication Review/Management: medications reviewed  Environmental Support:   calm environment promoted   distractions minimized   environmental consistency promoted   rest periods encouraged  Taken 1/2/2025 2000 by Matthew Strickland RN  Sleep/Rest Enhancement:   awakenings minimized   comfort measures   music provided   noise level reduced   regular sleep/rest pattern promoted   relaxation techniques promoted   room darkened  Medication Review/Management: medications reviewed  Environmental Support:   calm environment promoted   distractions minimized   environmental consistency promoted   rest periods encouraged  Goal: Optimal Nutrition Delivery  Outcome: Progressing  Intervention: Optimize Nutrition Delivery  Recent Flowsheet Documentation  Taken 1/3/2025 0400 by Matthew Strickland RN  Nutrition Support Management: weight trending reviewed  Taken 1/3/2025 0000 by Matthew Strickland RN  Nutrition Support Management: weight trending reviewed  Taken 1/2/2025 2000 by Matthew Strickland RN  Nutrition Support Management: weight trending reviewed  Goal: Absence of Device-Related Skin and Tissue Injury  Outcome: Progressing  Intervention: Maintain Skin and Tissue Health  Recent Flowsheet Documentation  Taken 1/3/2025 0400 by Matthew Strickland RN  Device Skin Pressure Protection:   absorbent pad utilized/changed   adhesive use limited   positioning supports utilized   tubing/devices free from skin contact  Taken 1/3/2025 0000 by Matthew Strickland RN  Device Skin Pressure Protection:   absorbent pad utilized/changed   adhesive use limited   positioning supports utilized   tubing/devices free from skin contact  Taken 1/2/2025 2000 by Matthew Strickland RN  Device Skin Pressure Protection:   absorbent pad utilized/changed   adhesive use limited   positioning supports utilized   tubing/devices free from skin contact  Goal: Absence of  Ventilator-Induced Lung Injury  Intervention: Facilitate Lung-Protection Measures  Recent Flowsheet Documentation  Taken 1/3/2025 0400 by Matthew Strickland RN  Lung Protection Measures:   fluid excess minimized   lung compliance monitored   optimal PEEP applied   ventilator synchrony promoted   ventilator waveforms monitored  Taken 1/3/2025 0000 by Matthew Strickland RN  Lung Protection Measures:   fluid excess minimized   lung compliance monitored   optimal PEEP applied   ventilator synchrony promoted   ventilator waveforms monitored  Taken 1/2/2025 2000 by Matthew Strickland RN  Lung Protection Measures:   fluid excess minimized   lung compliance monitored   optimal PEEP applied   ventilator synchrony promoted   ventilator waveforms monitored  Intervention: Prevent Ventilator-Associated Pneumonia  Recent Flowsheet Documentation  Taken 1/3/2025 0600 by Matthew Strickland RN  Head of Bed (HOB) Positioning: HOB at 30 degrees  Taken 1/3/2025 0400 by Matthew Strickland RN  VAP Prevention Bundle:   HOB elevation maintained   oral care regularly provided   stress ulcer prophylaxis provided   VTE prophylaxis provided   vent circuit breaks minimized  Oral Care:   suction provided   swabbed with sterile water  Head of Bed (HOB) Positioning: HOB at 30 degrees  VAP Prevention Measures: completed  Taken 1/3/2025 0000 by Matthew Strickland RN  VAP Prevention Bundle:   HOB elevation maintained   oral care regularly provided   stress ulcer prophylaxis provided   VTE prophylaxis provided   vent circuit breaks minimized  Oral Care:   suction provided   swabbed with sterile water  Head of Bed (HOB) Positioning: HOB at 30 degrees  VAP Prevention Measures: completed  Taken 1/2/2025 2200 by Matthew Strickland RN  Head of Bed (HOB) Positioning: HOB at 30 degrees  Taken 1/2/2025 2000 by Matthew Strickland RN  VAP Prevention Bundle:   HOB elevation maintained   oral care regularly provided   stress ulcer prophylaxis provided   VTE prophylaxis provided   vent  circuit breaks minimized  Oral Care:   swabbed with antiseptic solution   suction provided  Head of Bed (HOB) Positioning: HOB at 30 degrees  VAP Prevention Measures: completed

## 2025-01-03 NOTE — PROGRESS NOTES
[facility]    Vascular Neurology Progress Note    Interval History     Temp: 100.6 yesterday  SBP range: 119-156  Heart rate: WNL  Wbc: 8.3  Sodium: 145  Blood glucose: 164  Intubated   Sedated: precedex 0.2 mcg/kg/hr  On Zosyn     Acute events: extubated and failed breathing trial, reintubated. Palliative consulted for goals of care. Currently remains Full code and okay with reintubation, tracheostomy, and PEG tube placement to attempt rehab.    Exam today: able to say a few words but difficulty to understand with signifciant secretions     Hospital Course     Chief complaint: Dizziness (Sudden onset dizziness/sweating/family reported some slurred speech that was brief and gone by the time medics arrived. Was just sitting when episode happened.  No dizziness w change of position.  Off balance. +5 strength. LKW 1140)    Elio Aguilar is a 79 year old male with pertinent past medical history of a prior left corona radiata stroke in 2010 (no significant ICAD at that time), found to have PFO s/p closure at [place] per patient.  History of HTN, HLD, history of adult onset Reyes syndrome, DM2, does not see a doctor regularly.  Not on PTA ASA.     He presented to the emergency department 12/25/2024 with lightheadedness, diaphoresis, dysarthria x 5 minutes.  CTA showed severe basilar stenosis/occlusion beginning at V4/basilar junction.  No clear focal weakness or facial droop.  He was initially started on DAPT and then transition to heparin drip + ASA due to recurrent transient symptoms.  Initial MRI 12/26 negative for acute pathology.  Repeat CTA 12/27 showed some improvement in basilar narrowing.  Had new dysarthria so repeat MRI performed 12/28 and showed probable small pontine infarct.  MRA unchanged.     12/29 stroke code activated for progressive worsening of symptoms, dysarthria, hypophonia as well as new right arm/leg ataxia and right hemiparesis.  Underwent emergent diagnostic  cerebral angiogram s/p angioplasty x 3 for severe multifocal basilar artery stenosis (required intubation due to difficulty managing secretions).   12/30 newly febrile/leukocytosis, infectious workup started   1/1/25 CTA showed basilar artery stenosis with slightly better flow than before   1/2/25 had GOC discussion family unsure of trach/PEG versus trial extubation then CMO if fails    Assessment and Plan     #Ischemic strokes due to basilar stenosis/occlusion in the setting of large artery atherosclerosis. He is s/p balloon Angioplasty x3 of the multifocal proximal to middle basilar artery, TICI 3 recanalization   #Severe bulbar weakness, dysphagia 2/2 above s/p intubation  -Neuro checks and vitals every 2 hours  -continue Brilinta 90 mg twice daily and cilostazol 100 mg twice daily, duration per neuro IR  -No aspirin due to history of adult onset Reyes syndrome   -Appreciate ventilator management per intensivist team, wean sedation as tolerated to slowly work towards extubation when medically appropriate  -telemetry  -ongoing goals of care discussion, appreciate palliative care recommendations   -PT/OT/SPT, appreciate dietician recommendations on NGT/tube feeds   -Euthermia, euglycemia (BG <180), eunatremia  -Stroke Education, education provided on [facility] stroke symptoms   -Palliative care consulted to assist with goals of care     #Ventilatory associated pneumonia (fever, leukocytosis and pulmonary infiltrates)  -appreciate intensivist evaluation and management, currently on zosyn  -Respiratory culture normal tom, blood cultures NGTD      #Hypertension  -appreciate management per primary team  -Inpatient SBP goal 120-160  -Outpatient BP goal <140/90, with tighter control if tolerated  -Recommend home blood pressure monitoring twice daily in AM and PM, keep log and bring to medical visits     #Hyperlipidemia, above goal  -Atorvastatin 80 mg daily  -Recommend LDL goal 40-70, <40 increases risk of intracranial  "hemorrhage  -Recommend Mediterranean diet      #Diabetes mellitus type 2  -appreciate management per primary team  -blood glucose monitoring, long term A1c goal <7%    DVT Prophylaxis: okay with pharmacologic VTE ppx from NCC perspective when hemoglobin stable and cleared by MICU     Patient Follow-up    - final recommendation pending work-up   -PM&R in [place]   -Follow-up in [place] at [facility] with Dr. Wan Rowan (referral made) in 6-8 weeks     We will continue to follow.     Discussed with vascular neurologist, Dr. Forte.      Kandis Betancourt PA-C  Vascular Neurology    To page me or covering stroke neurology team member, click here: [facility]COM  Choose \"On Call\" tab at top, then select \"NEUROLOGY/ALL SITES\" from middle drop-down box, press Enter, then look for \"stroke\" or \"telestroke\" for your site.    Physical Examination     Temp: 98.4  F (36.9  C) Temp src: Esophageal BP: (!) 146/71 Pulse: 70   Resp: 18 SpO2: 94 % O2 Device: Mechanical Ventilator      Neurocritical Care Physical Examination:  General: Laying in bed, appears uncomfortable with audible thick secretions despite frequent suctioning  Resp: appears in discomfort trying to breathe/communicate/and attempt cough, extubated on breathing trial     Neuro:  Mental status: alert, able to say a few words but difficult to understand with dysarthria and thick secretions, following some limited commands  Cranial nerves: Left gaze preference, able to cross midline and track, Pupils 2mm and reactive to light, EOMI, appears to blink to threat bilaterally, weak cough with thick secretions  Motor: gives R thumbs up with command, and withdrawals to noxious RUE, no movement to LUE despite noxious but states he feels it slightly, no movement to L leg despite noxious but states he feels it slightly, some withdrawal to noxious RLE, does not wiggle toes to command  Reflexes: Deferred  Sensory: see motor  Coordination:not able to obtain due to " AMS/weakness   Gait: Unable to assess due to mental status.      Imaging/Labs   (Bolded imaging and labs new and/or personally reviewed or re-reviewed by me today)    MRI and/or Head CT MRI brain 12/30: Acute pontine and scattered right cerebellar/occipital lobe ischemic infarcts, patchy meningeal enhancement throughout right cerebellar hemisphere, nonspecific but may be related to recent catheter directed angiogram  MRI 12/28: Indeterminate diffusion hyperintensity in the ventral armin, without a definite T2 or ADC correlate. Findings may reflect evolving ischemic change or artifact from the adjacent skull base.   Initial MRI: No acute stroke   Intracranial Vasculature MRA 12/28: Unchanged     CTA 12/27: Formally read as stable from prior, however feel there is some improvement in basilar stenosis on stroke team review.     CTA: Multifocal high-grade stenosis/near occlusion of the proximal/mid basilar, bilateral PCA stenosis, moderate left M1 stenosis, moderate supraclinoid right ICA stenosis     MRA: Absent flow related contrast at the basilar artery, multifocal iCAD   Cervical Vasculature CTA: 40 to 50% right cervical ICA stenosis      Echocardiogram EF 60 to 65%, no regional wall motion abnormalities, normal left atrial size   EKG/Telemetry SR   Other Testing P2Y12: 7     Diagnostic cerebral angiogram:  successful Balloon Angioplasty x3 of the multifocal proximal to middle basilar artery stenosis TICI 3 recanalization. With residual stenosis measuring per WASID criteria. pending formal report    Respiratory culture: 4+ normal tom (final result)  Blood cultures:  NGTD      LDL  12/25/2024: 131 mg/dL   A1C  12/25/2024: 5.1 %   Troponin No lab value available in past 48 hrs     Other labs reviewed by me today:  BMP, Mg, K, Phosphorus, CBC    Time Spent on this Encounter   Billing: I personally examined and evaluated the patient today. At the time of my evaluation and management the patient was in critical  condition today due to stroke code. I personally managed review of chart, medical record, meds, imaging, history, exam, and discussion with attending regarding plan and documentation. I spent a total of 50 minutes providing critical care services, evaluating the patient, directing care and reviewing laboratory values and radiologic reports.

## 2025-01-03 NOTE — PROCEDURES
"[facility] Procedure Note          Intubation:      Date/Time: 4:04 PM  Performed by: Isaias Macedo MD    Consent: Verbal consent obtained and the patient and family states understanding of the procedure being performed  Risks and benefits: risks, benefits and alternatives were discussed  Patient identity confirmed: verbally with patient and arm band  Time out: Immediately prior to procedure a \"time out\" was called to verify the correct patient, procedure, equipment, support staff and site/side marked as required.    Indication: Respiratory failure and airway protection    Procedure:  A time out was performed. The patient was preoxygenated with 100% oxygen. The patient was induced with 30 mg etomidate and paralyzed with 100 mg rocuronium. A glidescope was introduced and the vocal cords were easily visualized. A 7.5F endotracheal tube was placed past the vocal cords and the balloon was inflated. Correct positioning was confirmed with end tidal color change monitor and bilateral auscultation. The tube was secured at 26 cm at the teeth.    CXR pending.    Complications:  None    Patient tolerance: Patient tolerated the procedure well with no immediate complications.    Dr. Faulkner was present for the procedure.    Isaias Macedo  Surgical Critical Care Fellow    " yes

## 2025-01-03 NOTE — PROGRESS NOTES
[place]    Patient continues to have several loose, watery stools. Also coughed up small amount of bloody mucous, has blood tinged ETT secretions. Patient is hemodynamically stable.     Plan:  - Give PRN immodium  - Okay to insert rectal tube  - Hemoglobin this morning was 12.9, was down from 16.6 on 12/30. Platelets WNL. Already had evening dose of brilinta, hold subcutaneous heparin for now. RN will change out vent filter/suction tubing and monitor closely for ongoing bleeding, if hemoglobin stable, no significant bleeding, and hemodynamically stable could likely restart subcutaneous heparin in the morning.     Tanesha Alonso, NP

## 2025-01-04 LAB
ANION GAP SERPL CALCULATED.3IONS-SCNC: 10 MMOL/L (ref 7–15)
BACTERIA BLD CULT: NO GROWTH
BACTERIA BLD CULT: NO GROWTH
BACTERIA SPT CULT: NORMAL
BUN SERPL-MCNC: 51.6 MG/DL (ref 8–23)
CALCIUM SERPL-MCNC: 8.8 MG/DL (ref 8.8–10.4)
CHLORIDE SERPL-SCNC: 118 MMOL/L (ref 98–107)
CREAT SERPL-MCNC: 1.52 MG/DL (ref 0.67–1.17)
EGFRCR SERPLBLD CKD-EPI 2021: 46 ML/MIN/1.73M2
ERYTHROCYTE [DISTWIDTH] IN BLOOD BY AUTOMATED COUNT: 13 % (ref 10–15)
GLUCOSE BLDC GLUCOMTR-MCNC: 138 MG/DL (ref 70–99)
GLUCOSE SERPL-MCNC: 161 MG/DL (ref 70–99)
GRAM STAIN RESULT: NORMAL
GRAM STAIN RESULT: NORMAL
HCO3 SERPL-SCNC: 23 MMOL/L (ref 22–29)
HCT VFR BLD AUTO: 35.4 % (ref 40–53)
HGB BLD-MCNC: 12.1 G/DL (ref 13.3–17.7)
MAGNESIUM SERPL-MCNC: 2.5 MG/DL (ref 1.7–2.3)
MCH RBC QN AUTO: 32.4 PG (ref 26.5–33)
MCHC RBC AUTO-ENTMCNC: 34.2 G/DL (ref 31.5–36.5)
MCV RBC AUTO: 95 FL (ref 78–100)
PHOSPHATE SERPL-MCNC: 2.6 MG/DL (ref 2.5–4.5)
PLATELET # BLD AUTO: 196 10E3/UL (ref 150–450)
POTASSIUM SERPL-SCNC: 3.7 MMOL/L (ref 3.4–5.3)
POTASSIUM SERPL-SCNC: 3.8 MMOL/L (ref 3.4–5.3)
RBC # BLD AUTO: 3.74 10E6/UL (ref 4.4–5.9)
SODIUM SERPL-SCNC: 151 MMOL/L (ref 135–145)
WBC # BLD AUTO: 10.1 10E3/UL (ref 4–11)

## 2025-01-04 PROCEDURE — 84100 ASSAY OF PHOSPHORUS: CPT | Performed by: STUDENT IN AN ORGANIZED HEALTH CARE EDUCATION/TRAINING PROGRAM

## 2025-01-04 PROCEDURE — 250N000009 HC RX 250: Performed by: INTERNAL MEDICINE

## 2025-01-04 PROCEDURE — 94003 VENT MGMT INPAT SUBQ DAY: CPT

## 2025-01-04 PROCEDURE — 80048 BASIC METABOLIC PNL TOTAL CA: CPT | Performed by: STUDENT IN AN ORGANIZED HEALTH CARE EDUCATION/TRAINING PROGRAM

## 2025-01-04 PROCEDURE — 999N000157 HC STATISTIC RCP TIME EA 10 MIN

## 2025-01-04 PROCEDURE — 85027 COMPLETE CBC AUTOMATED: CPT | Performed by: INTERNAL MEDICINE

## 2025-01-04 PROCEDURE — 82310 ASSAY OF CALCIUM: CPT | Performed by: INTERNAL MEDICINE

## 2025-01-04 PROCEDURE — 250N000013 HC RX MED GY IP 250 OP 250 PS 637

## 2025-01-04 PROCEDURE — 250N000013 HC RX MED GY IP 250 OP 250 PS 637: Performed by: SURGERY

## 2025-01-04 PROCEDURE — 250N000013 HC RX MED GY IP 250 OP 250 PS 637: Performed by: PHYSICIAN ASSISTANT

## 2025-01-04 PROCEDURE — 250N000013 HC RX MED GY IP 250 OP 250 PS 637: Performed by: PSYCHIATRY & NEUROLOGY

## 2025-01-04 PROCEDURE — 250N000011 HC RX IP 250 OP 636: Performed by: INTERNAL MEDICINE

## 2025-01-04 PROCEDURE — 120N000004 HC R&B MS OVERFLOW

## 2025-01-04 PROCEDURE — 99291 CRITICAL CARE FIRST HOUR: CPT | Mod: FS | Performed by: PHYSICIAN ASSISTANT

## 2025-01-04 PROCEDURE — 83735 ASSAY OF MAGNESIUM: CPT | Performed by: STUDENT IN AN ORGANIZED HEALTH CARE EDUCATION/TRAINING PROGRAM

## 2025-01-04 PROCEDURE — 99291 CRITICAL CARE FIRST HOUR: CPT | Performed by: INTERNAL MEDICINE

## 2025-01-04 PROCEDURE — 36415 COLL VENOUS BLD VENIPUNCTURE: CPT | Performed by: STUDENT IN AN ORGANIZED HEALTH CARE EDUCATION/TRAINING PROGRAM

## 2025-01-04 PROCEDURE — 36415 COLL VENOUS BLD VENIPUNCTURE: CPT | Performed by: INTERNAL MEDICINE

## 2025-01-04 PROCEDURE — 250N000013 HC RX MED GY IP 250 OP 250 PS 637: Performed by: STUDENT IN AN ORGANIZED HEALTH CARE EDUCATION/TRAINING PROGRAM

## 2025-01-04 RX ORDER — SCOPOLAMINE 1 MG/3D
1 PATCH, EXTENDED RELEASE TRANSDERMAL
Status: DISCONTINUED | OUTPATIENT
Start: 2025-01-04 | End: 2025-01-07 | Stop reason: HOSPADM

## 2025-01-04 RX ADMIN — ACETAMINOPHEN 650 MG: 325 TABLET, FILM COATED ORAL at 20:00

## 2025-01-04 RX ADMIN — CILOSTAZOL 100 MG: 100 TABLET ORAL at 20:00

## 2025-01-04 RX ADMIN — CHLORHEXIDINE GLUCONATE 15 ML: 1.2 SOLUTION ORAL at 07:53

## 2025-01-04 RX ADMIN — PIPERACILLIN AND TAZOBACTAM 4.5 G: 4; .5 INJECTION, POWDER, FOR SOLUTION INTRAVENOUS at 00:37

## 2025-01-04 RX ADMIN — PIPERACILLIN AND TAZOBACTAM 4.5 G: 4; .5 INJECTION, POWDER, FOR SOLUTION INTRAVENOUS at 17:50

## 2025-01-04 RX ADMIN — Medication 40 MG: at 07:58

## 2025-01-04 RX ADMIN — TICAGRELOR 90 MG: 90 TABLET ORAL at 20:00

## 2025-01-04 RX ADMIN — PIPERACILLIN AND TAZOBACTAM 4.5 G: 4; .5 INJECTION, POWDER, FOR SOLUTION INTRAVENOUS at 11:55

## 2025-01-04 RX ADMIN — TICAGRELOR 90 MG: 90 TABLET ORAL at 07:58

## 2025-01-04 RX ADMIN — ATORVASTATIN CALCIUM 80 MG: 40 TABLET, FILM COATED ORAL at 20:00

## 2025-01-04 RX ADMIN — CHLORHEXIDINE GLUCONATE 15 ML: 1.2 SOLUTION ORAL at 20:00

## 2025-01-04 RX ADMIN — CILOSTAZOL 100 MG: 100 TABLET ORAL at 07:58

## 2025-01-04 RX ADMIN — DEXMEDETOMIDINE HYDROCHLORIDE 0.2 MCG/KG/HR: 400 INJECTION INTRAVENOUS at 12:13

## 2025-01-04 RX ADMIN — SCOLOPAMINE TRANSDERMAL SYSTEM 1 PATCH: 1 PATCH, EXTENDED RELEASE TRANSDERMAL at 13:51

## 2025-01-04 RX ADMIN — PIPERACILLIN AND TAZOBACTAM 4.5 G: 4; .5 INJECTION, POWDER, FOR SOLUTION INTRAVENOUS at 06:51

## 2025-01-04 RX ADMIN — ACETAMINOPHEN 650 MG: 325 TABLET, FILM COATED ORAL at 04:08

## 2025-01-04 ASSESSMENT — ACTIVITIES OF DAILY LIVING (ADL)
ADLS_ACUITY_SCORE: 86
ADLS_ACUITY_SCORE: 82
ADLS_ACUITY_SCORE: 54
ADLS_ACUITY_SCORE: 82
ADLS_ACUITY_SCORE: 86
ADLS_ACUITY_SCORE: 82
ADLS_ACUITY_SCORE: 54
ADLS_ACUITY_SCORE: 82
ADLS_ACUITY_SCORE: 82
ADLS_ACUITY_SCORE: 54
ADLS_ACUITY_SCORE: 82
ADLS_ACUITY_SCORE: 86
ADLS_ACUITY_SCORE: 54
ADLS_ACUITY_SCORE: 82
ADLS_ACUITY_SCORE: 54
ADLS_ACUITY_SCORE: 82

## 2025-01-04 NOTE — PROGRESS NOTES
[facility] ICU RESPIRATORY NOTE        Date of Admission: 12/25/2024    Date of Intubation (most recent): 1/3/2024    Reason for Mechanical Ventilation: AP    Number of Days on Mechanical Ventilation: 6    Met Criteria for Spontaneous Breathing Trial: NO    Reason for No Spontaneous Breathing Trial: per MD    Bite Block: None    Significant Events Today: None    ABG Results:   Recent Labs   Lab 01/03/25  1511 01/01/25  2116 12/30/24  0455   PH  --   --  7.37   PCO2  --   --  36   PO2  --   --  97   HCO3  --   --  21   O2PER 30 30 30         Current Vent Settings: FiO2 (%): 40 %, Resp: 25, Inspiratory Pressure (cm H2O) (Drager Stefany): 15, Vent Mode: CMV/AC, Resp Rate (Set): 18 breaths/min, Tidal Volume (Set, mL): 530 mL, PEEP (cm H2O): 5 cmH2O, Pressure Support (cm H2O): 5 cmH2O, Resp Rate (Set): 18 breaths/min, Tidal Volume (Set, mL): 530 mL, PEEP (cm H2O): 5 cmH2O    Skin Assessment: Intact    Plan:  Continue to monitor. Assess readiness for SBT    RT Cm on 1/4/2025 at 4:59 AM

## 2025-01-04 NOTE — PLAN OF CARE
Goal Outcome Evaluation:      Plan of Care Reviewed With: patient    Overall Patient Progress: no changeOverall Patient Progress: no change     Neuro: q2h neuro checks continued. RASS -1. Arouses to voice. RUE purposeful, all other extremities withdraw. Shakes head to yes/no questions.  Cardiac: SR. HR 70s.  Resp: Remains vented at 40%, peep 5. Large amt of thicks/red-streaked secretions from ett inline. Lung sounds coarse.  GI/: External cath with good UOP. Rectal tube in place. TF remains at goal.  Pain: complaint of pain once - tylenol given  Skin: no new concerns  Lines: PIV x2  Gtts: precedex 0.2      Plan: continue to evaluate resp status, decrease vent and sedation as able      *see Epic flowsheets for full nursing assessment findings       Problem: Adult Inpatient Plan of Care  Goal: Optimal Comfort and Wellbeing  Outcome: Not Progressing  Intervention: Monitor Pain and Promote Comfort  Recent Flowsheet Documentation  Taken 1/4/2025 0400 by Rose Mary Gresham, RN  Pain Management Interventions:   medication (see MAR)   repositioned  Intervention: Provide Person-Centered Care  Recent Flowsheet Documentation  Taken 1/4/2025 0400 by Rose Mary Gresham, RN  Trust Relationship/Rapport:   care explained   choices provided  Taken 1/4/2025 0000 by Rose Mary Gresham, RN  Trust Relationship/Rapport:   care explained   choices provided  Taken 1/3/2025 2000 by Rose Mary Gresham, IZABELA  Trust Relationship/Rapport:   care explained   choices provided  Goal: Plan of Care Review  Description: The Plan of Care Review/Shift note should be completed every shift.  The Outcome Evaluation is a brief statement about your assessment that the patient is improving, declining, or no change.  This information will be displayed automatically on your shift  note.  Outcome: Not Progressing  Flowsheets (Taken 1/4/2025 0529)  Plan of Care Reviewed With: patient  Overall Patient Progress: no change     Problem: Stroke, Ischemic (Includes Transient Ischemic  Attack)  Goal: Effective Bowel Elimination  Outcome: Not Progressing  Goal: Improved Communication Skills  Outcome: Not Progressing  Intervention: Optimize Communication Skills  Recent Flowsheet Documentation  Taken 1/4/2025 0400 by Rose Mary Gresham RN  Communication Enhancement Strategies:   nonverbal strategies used   one-step directions provided   repetition utilized  Taken 1/4/2025 0000 by Rose Mary Gresham RN  Communication Enhancement Strategies:   nonverbal strategies used   one-step directions provided   repetition utilized  Taken 1/3/2025 2000 by Rose Mary Gresham RN  Communication Enhancement Strategies:   nonverbal strategies used   one-step directions provided   repetition utilized  Goal: Optimal Functional Ability  Outcome: Not Progressing  Intervention: Optimize Functional Ability  Recent Flowsheet Documentation  Taken 1/4/2025 0400 by Rose Mary Gresham RN  Activity Management: bedrest  Taken 1/4/2025 0000 by Rose Mary Gresahm RN  Activity Management: bedrest  Taken 1/3/2025 2000 by Rose Mary Gresham RN  Activity Management: bedrest  Goal: Effective Oxygenation and Ventilation  Outcome: Not Progressing  Intervention: Optimize Oxygenation and Ventilation  Recent Flowsheet Documentation  Taken 1/4/2025 0400 by Rose Mary Gresham RN  Airway/Ventilation Management:   airway patency maintained   calming measures promoted   pulmonary hygiene promoted  Head of Bed (HOB) Positioning: HOB at 30 degrees  Taken 1/4/2025 0200 by Rose Mary Gresham RN  Head of Bed (HOB) Positioning: HOB at 30 degrees  Taken 1/4/2025 0000 by Rose Mary Gresham RN  Airway/Ventilation Management:   airway patency maintained   calming measures promoted   pulmonary hygiene promoted  Head of Bed (HOB) Positioning: HOB at 30 degrees  Taken 1/3/2025 2200 by Rose Mary Gresham RN  Head of Bed (HOB) Positioning: HOB at 30 degrees  Taken 1/3/2025 2000 by Rose Mary Gresham RN  Airway/Ventilation Management:   airway patency maintained   calming measures promoted   pulmonary hygiene  promoted  Head of Bed (HOB) Positioning: HOB at 30 degrees  Goal: Safe and Effective Swallow  Outcome: Not Progressing  Intervention: Promote and Optimize Fluid and Food Intake  Recent Flowsheet Documentation  Taken 1/4/2025 0400 by Rose Mray Gresham RN  Aspiration Precautions:   NPO pending swallow screening/evaluation   oral hygiene care promoted   respiratory status monitored   upright posture maintained   tube feeding placement verified  Taken 1/4/2025 0000 by Rose Mary Gersham RN  Aspiration Precautions:   NPO pending swallow screening/evaluation   oral hygiene care promoted   respiratory status monitored   upright posture maintained   tube feeding placement verified  Taken 1/3/2025 2000 by Rose Mary Gresham RN  Aspiration Precautions:   NPO pending swallow screening/evaluation   oral hygiene care promoted   respiratory status monitored   upright posture maintained   tube feeding placement verified

## 2025-01-04 NOTE — PROGRESS NOTES
[facility]    Vascular Neurology Progress Note    Interval History     Temp: 100.2  SBP range:118-188  Heart rate: 66-96  Intubated   Sedated: precedex 0.2 mcg/kg/hr  On Zosyn     Acute events: Goals of care discussion with family, patient had indicated that he did not want to be intubated or receive trach/PEG but wants to continue full medical care and believes he will have recovery, there seems to be some level of misunderstanding and question of decision-making capacity.  Family is concerned of the level of suffering that he may endure.  Palliative care and ethics have been consulted to ensure patient is of adequate decision-making capacity.    Exam today: No significant change (see below)    Hospital Course     Chief complaint: Dizziness (Sudden onset dizziness/sweating/family reported some slurred speech that was brief and gone by the time medics arrived. Was just sitting when episode happened.  No dizziness w change of position.  Off balance. +5 strength. LKW 1140)    Elio Aguilar is a 79 year old male with pertinent past medical history of a prior left corona radiata stroke in 2010 (no significant ICAD at that time), found to have PFO s/p closure at [place] per patient.  History of HTN, HLD, history of adult onset Reyes syndrome, DM2, does not see a doctor regularly.  Not on PTA ASA.     He presented to the emergency department 12/25/2024 with lightheadedness, diaphoresis, dysarthria x 5 minutes.  CTA showed severe basilar stenosis/occlusion beginning at V4/basilar junction.  No clear focal weakness or facial droop.  He was initially started on DAPT and then transition to heparin drip + ASA due to recurrent transient symptoms.  Initial MRI 12/26 negative for acute pathology.  Repeat CTA 12/27 showed some improvement in basilar narrowing.  Had new dysarthria so repeat MRI performed 12/28 and showed probable small pontine infarct.  MRA unchanged.     12/29 stroke code activated  for progressive worsening of symptoms, dysarthria, hypophonia as well as new right arm/leg ataxia and right hemiparesis.  Underwent emergent diagnostic cerebral angiogram s/p angioplasty x 3 for severe multifocal basilar artery stenosis (required intubation due to difficulty managing secretions).   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 He is s/p balloon Angioplasty x3 of the multifocal proximal to middle basilar artery, TICI 3 recanalization   #Severe bulbar weakness, dysphagia 2/2 above s/p intubation  -Neuro checks and vitals every 2 hours  -continue Brilinta 90 mg twice daily and cilostazol 100 mg twice daily, duration per neuro IR  -No aspirin due to history of adult onset Reyes syndrome   -Appreciate ventilator management per intensivist team, wean sedation as tolerated   -telemetry  -ongoing goals of care discussion, appreciate palliative care and ethics recommendations   -PT/OT/SPT, appreciate dietician recommendations on NGT/tube feeds   -Euthermia, euglycemia (BG <180), eunatremia  -Stroke Education, education provided on FAST stroke symptoms   -Palliative care consulted to assist with goals of care     #Ventilatory associated pneumonia (fever, leukocytosis and pulmonary infiltrates)  -appreciate intensivist evaluation and management, currently on zosyn  -Respiratory culture normal tom, blood cultures NGTD      #Hypertension  -appreciate management per primary team  -Inpatient SBP goal 120-160  -Outpatient BP goal <140/90, with tighter  "control if tolerated  -Recommend home blood pressure monitoring twice daily in AM and PM, keep log and bring to medical visits     #Hyperlipidemia, above goal  -Atorvastatin 80 mg daily  -Recommend LDL goal 40-70, <40 increases risk of intracranial hemorrhage  -Recommend Mediterranean diet      #Diabetes mellitus type 2  -appreciate management per primary team  -blood glucose monitoring, long term A1c goal <7%    DVT Prophylaxis: okay with pharmacologic VTE ppx from NCC perspective when hemoglobin stable and cleared by MICU     Patient Follow-up    - final recommendation pending work-up   -PM&R in [place]   -Follow-up in [place] at [facility] with Dr. Wan Rowan (referral made) in 6-8 weeks     We will continue to follow.     Discussed with vascular neurologist, Dr. Jann Betancourt PA-C  Vascular Neurology    To page me or covering stroke neurology team member, click here: [facility]  Choose \"On Call\" tab at top, then select \"NEUROLOGY/ALL SITES\" from middle drop-down box, press Enter, then look for \"stroke\" or \"telestroke\" for your site.    Physical Examination     Temp: 97.6  F (36.4  C) Temp src: Oral BP: (!) 153/69 Pulse: 69   Resp: 25 SpO2: 99 % O2 Device: Mechanical Ventilator Oxygen Delivery: 15 LPM    Neurocritical Care Physical Examination:   General: Laying in bed, mechanical ventilation, eyes open as this provider enters the room  Resp: mechanical ventilation, thick secretions,suctioned twice     Neuro:  Mental status: alert, minimal sedation, able to nod yes/no to some questions, able to say a few words but difficult to understand with dysarthria and thick secretions, following some limited commands  Cranial nerves: Left gaze preference, able to cross midline and track, Pupils 2mm and reactive to light, EOMI, appears to blink to threat bilaterally, cough intact to suction, face grossly symmetric with ETT  Motor: gives R thumbs up with command, and withdrawals to noxious " RUE no effort against gravity, no movement to LUE despite noxious but nods that he feels it, no movement to L leg but nods that he feels noxious, mild withdrawal to noxious RLE, does not wiggle toes to command  Reflexes: Deferred  Sensory: see motor  Coordination:not able to obtain due to AMS/weakness   Gait: Unable to assess due to mental status.      Imaging/Labs   (Bolded imaging and labs new and/or personally reviewed or re-reviewed by me today)    MRI and/or Head CT MRI brain 12/30: Acute pontine and scattered right cerebellar/occipital lobe ischemic infarcts, patchy meningeal enhancement throughout right cerebellar hemisphere, nonspecific but may be related to recent catheter directed angiogram  MRI 12/28: Indeterminate diffusion hyperintensity in the ventral armin, without a definite T2 or ADC correlate. Findings may reflect evolving ischemic change or artifact from the adjacent skull base.   Initial MRI: No acute stroke   Intracranial Vasculature MRA 12/28: Unchanged     CTA 12/27: Formally read as stable from prior, however feel there is some improvement in basilar stenosis on stroke team review.     CTA: Multifocal high-grade stenosis/near occlusion of the proximal/mid basilar, bilateral PCA stenosis, moderate left M1 stenosis, moderate supraclinoid right ICA stenosis     MRA: Absent flow related contrast at the basilar artery, multifocal iCAD   Cervical Vasculature CTA: 40 to 50% right cervical ICA stenosis      Echocardiogram EF 60 to 65%, no regional wall motion abnormalities, normal left atrial size   EKG/Telemetry SR   Other Testing P2Y12: 7     Diagnostic cerebral angiogram:  successful Balloon Angioplasty x3 of the multifocal proximal to middle basilar artery stenosis TICI 3 recanalization. With residual stenosis measuring per WASID criteria. pending formal report    Respiratory culture: 4+ normal tom (final result)  Blood cultures:  NGTD      LDL  12/25/2024: 131 mg/dL   A1C  12/25/2024: 5.1 %    Troponin No lab value available in past 48 hrs     Other labs reviewed by me today:  Phosphorus, Mg, K    Time Spent on this Encounter   Billing: I personally examined and evaluated the patient today. At the time of my evaluation and management the patient was in critical condition today due to stroke code. I personally managed review of chart, medical record, meds, imaging, history, exam, and discussion with attending regarding plan and documentation. I spent a total of 45 minutes providing critical care services, evaluating the patient, directing care and reviewing laboratory values and radiologic reports.

## 2025-01-04 NOTE — PLAN OF CARE
"Goal Outcome Evaluation:       0730-1900    Neuro: PERRL, open eyes to voice, follows commands. Able to nod yes or no to questions -can to thumbs up in R hand and withdraws on BLE. On Precedex for sedation.     CV: Tele SR/ST, freq PAC noted. PRN Labetalol given for SBP >160     Resp: LS course, very thick red-tinged secretions. PS trial and extubated @ 1230 - unable to maintain oral secretions -frequent NT suctioning. Re-intubated @ 1600 , vent at 18/530/30%/5.    GI: BS active, Rectal tube in place with adequate output . NPO . OG inserted , TF infusing at goal.     : External catheter in place. Voiding 100ml/hr.     Skin: Coccyx blanchable redness, Mepliex in place.     Pain: Denies     Activity: A2/Lift.     Plan: Continue with plan of care.       Problem: Adult Inpatient Plan of Care  Goal: Patient-Specific Goal (Individualized)  Description: You can add care plan individualizations to a care plan. Examples of Individualization might be:  \"Parent requests to be called daily at 9am for status\", \"I have a hard time hearing out of my right ear\", or \"Do not touch me to wake me up as it startles  me\".  Outcome: Not Progressing  Goal: Absence of Hospital-Acquired Illness or Injury  Outcome: Not Progressing  Intervention: Identify and Manage Fall Risk  Recent Flowsheet Documentation  Taken 1/3/2025 1600 by Sayda Torres RN  Safety Promotion/Fall Prevention:   clutter free environment maintained   increased rounding and observation   lighting adjusted   room door open   room near nurse's station   room organization consistent   treat reversible contributory factors   treat underlying cause   safety round/check completed  Taken 1/3/2025 1200 by Sayda Torres RN  Safety Promotion/Fall Prevention:   clutter free environment maintained   increased rounding and observation   lighting adjusted   room door open   room near nurse's station   room organization consistent   treat reversible contributory " factors   treat underlying cause   safety round/check completed  Taken 1/3/2025 0800 by Sayda Torres RN  Safety Promotion/Fall Prevention:   clutter free environment maintained   increased rounding and observation   lighting adjusted   room door open   room near nurse's station   room organization consistent   treat reversible contributory factors   treat underlying cause   safety round/check completed  Intervention: Prevent Skin Injury  Recent Flowsheet Documentation  Taken 1/3/2025 1800 by Sayda Torres RN  Body Position:   turned   heels elevated   left  Taken 1/3/2025 1600 by Sayda Torres RN  Skin Protection:   adhesive use limited   incontinence pads utilized   silicone foam dressing in place   transparent dressing maintained  Taken 1/3/2025 1400 by Sayda Torres RN  Body Position: sitting up in bed  Taken 1/3/2025 1200 by Sayda Torres RN  Body Position: sitting up in bed  Skin Protection:   adhesive use limited   incontinence pads utilized   silicone foam dressing in place   transparent dressing maintained  Taken 1/3/2025 1000 by Sayda Torres RN  Body Position:   turned   heels elevated   left  Taken 1/3/2025 0800 by Sayda Torres RN  Body Position:   turned   right   heels elevated  Skin Protection:   adhesive use limited   incontinence pads utilized   silicone foam dressing in place   transparent dressing maintained  Intervention: Prevent and Manage VTE (Venous Thromboembolism) Risk  Recent Flowsheet Documentation  Taken 1/3/2025 1600 by Sayda Torres RN  VTE Prevention/Management: SCDs on (sequential compression devices)  Taken 1/3/2025 1200 by Sayda Torres RN  VTE Prevention/Management: SCDs on (sequential compression devices)  Taken 1/3/2025 0800 by Sayda Torres RN  VTE Prevention/Management: SCDs on (sequential compression devices)  Intervention: Prevent Infection  Recent Flowsheet Documentation  Taken 1/3/2025 1600 by  Sayda Torres RN  Infection Prevention:   environmental surveillance performed   rest/sleep promoted   single patient room provided  Taken 1/3/2025 1200 by Sayda Torres RN  Infection Prevention:   environmental surveillance performed   rest/sleep promoted   single patient room provided  Taken 1/3/2025 0800 by Sayda Torres RN  Infection Prevention:   environmental surveillance performed   rest/sleep promoted   single patient room provided  Goal: Optimal Comfort and Wellbeing  Outcome: Not Progressing  Intervention: Provide Person-Centered Care  Recent Flowsheet Documentation  Taken 1/3/2025 1600 by Sayda Torres RN  Trust Relationship/Rapport:   care explained   reassurance provided  Taken 1/3/2025 1200 by Sayda Torres RN  Trust Relationship/Rapport:   care explained   reassurance provided  Taken 1/3/2025 0800 by Sayda Torres RN  Trust Relationship/Rapport:   care explained   reassurance provided  Goal: Readiness for Transition of Care  Outcome: Not Progressing  Goal: Plan of Care Review  Description: The Plan of Care Review/Shift note should be completed every shift.  The Outcome Evaluation is a brief statement about your assessment that the patient is improving, declining, or no change.  This information will be displayed automatically on your shift  note.  Outcome: Not Progressing     Problem: Stroke, Ischemic (Includes Transient Ischemic Attack)  Goal: Optimal Coping  Outcome: Not Progressing  Intervention: Support Psychosocial Response to Stroke  Recent Flowsheet Documentation  Taken 1/3/2025 1600 by Sayda Torres RN  Supportive Measures:   positive reinforcement provided   relaxation techniques promoted  Family/Support System Care:   caregiver stress acknowledged   involvement promoted   presence promoted   self-care encouraged   support provided  Taken 1/3/2025 1200 by Sayda Torres RN  Supportive Measures:   positive reinforcement provided    relaxation techniques promoted  Family/Support System Care:   caregiver stress acknowledged   involvement promoted   presence promoted   self-care encouraged   support provided  Taken 1/3/2025 0800 by Sayda Torres RN  Supportive Measures:   positive reinforcement provided   relaxation techniques promoted  Family/Support System Care:   caregiver stress acknowledged   involvement promoted   presence promoted   self-care encouraged   support provided  Goal: Effective Bowel Elimination  Outcome: Not Progressing  Goal: Optimal Cerebral Tissue Perfusion  Outcome: Not Progressing  Intervention: Protect and Optimize Cerebral Perfusion  Recent Flowsheet Documentation  Taken 1/3/2025 1600 by Sayda Torres RN  Sensory Stimulation Regulation:   care clustered   lighting decreased   music on   quiet environment promoted  Cerebral Perfusion Promotion: blood pressure monitored  Fluid/Electrolyte Management: fluids provided  Taken 1/3/2025 1200 by Sayda Torres RN  Sensory Stimulation Regulation:   care clustered   lighting decreased   music on   quiet environment promoted  Cerebral Perfusion Promotion: blood pressure monitored  Fluid/Electrolyte Management: fluids provided  Taken 1/3/2025 0800 by Sayda Torres RN  Sensory Stimulation Regulation:   care clustered   lighting decreased   music on   quiet environment promoted  Cerebral Perfusion Promotion: blood pressure monitored  Fluid/Electrolyte Management: fluids provided  Goal: Improved Communication Skills  Outcome: Not Progressing  Intervention: Optimize Communication Skills  Recent Flowsheet Documentation  Taken 1/3/2025 1600 by Sayda Torres RN  Communication Enhancement Strategies:   call light answered in person   one-step directions provided   repetition utilized   verbal and visual cues paired   extra time allowed for response  Taken 1/3/2025 1200 by Sayda Torres RN  Communication Enhancement Strategies:   call light  answered in person   one-step directions provided   repetition utilized   verbal and visual cues paired   extra time allowed for response  Taken 1/3/2025 0800 by Sayda Torres RN  Communication Enhancement Strategies:   call light answered in person   one-step directions provided   repetition utilized   verbal and visual cues paired   extra time allowed for response  Goal: Optimal Functional Ability  Outcome: Not Progressing  Intervention: Optimize Functional Ability  Recent Flowsheet Documentation  Taken 1/3/2025 1800 by Sayda Torres RN  Activity Management: bedrest  Taken 1/3/2025 1600 by Sayda Torres RN  Activity Management: bedrest  Taken 1/3/2025 1400 by Sayda Torres RN  Activity Management: bedrest  Taken 1/3/2025 1200 by Sayda Torres RN  Activity Management: bedrest  Taken 1/3/2025 1000 by Sayda Torres RN  Activity Management: bedrest  Taken 1/3/2025 0800 by Sayda Torres RN  Activity Management: bedrest  Goal: Optimal Nutrition Intake  Outcome: Not Progressing  Goal: Effective Oxygenation and Ventilation  Outcome: Not Progressing  Intervention: Optimize Oxygenation and Ventilation  Recent Flowsheet Documentation  Taken 1/3/2025 1800 by Sayda Torres RN  Head of Bed (HOB) Positioning: HOB at 30 degrees  Taken 1/3/2025 1600 by Sayda Torres RN  Airway/Ventilation Management:   airway patency maintained   calming measures promoted   pulmonary hygiene promoted  Taken 1/3/2025 1200 by Sayda Torres RN  Airway/Ventilation Management:   airway patency maintained   calming measures promoted   pulmonary hygiene promoted  Head of Bed (HOB) Positioning: HOB at 30 degrees  Taken 1/3/2025 1000 by Sayda Torres RN  Head of Bed (HOB) Positioning: HOB at 30 degrees  Taken 1/3/2025 0800 by Sayda Torres RN  Airway/Ventilation Management:   airway patency maintained   calming measures promoted   pulmonary hygiene  promoted  Head of Bed (HOB) Positioning: HOB at 30 degrees  Goal: Improved Sensorimotor Function  Outcome: Not Progressing  Intervention: Optimize Range of Motion, Motor Control and Function  Recent Flowsheet Documentation  Taken 1/3/2025 1800 by Sayda Torres RN  Positioning/Transfer Devices:   pillows   wedge   in use  Taken 1/3/2025 1600 by Sayda Torres RN  Range of Motion: ROM (range of motion) performed  Taken 1/3/2025 1200 by Sayda Torres RN  Range of Motion: ROM (range of motion) performed  Positioning/Transfer Devices:   pillows   wedge   in use  Taken 1/3/2025 1000 by Sayda Torres RN  Positioning/Transfer Devices:   pillows   wedge   in use  Taken 1/3/2025 0800 by Sayda Torres RN  Range of Motion: ROM (range of motion) performed  Positioning/Transfer Devices:   pillows   wedge   in use  Intervention: Optimize Sensory and Perceptual Ability  Recent Flowsheet Documentation  Taken 1/3/2025 1600 by Sayda Torres RN  Pressure Reduction Techniques: heels elevated off bed  Pressure Reduction Devices:   positioning supports utilized   specialty bed utilized  Taken 1/3/2025 1200 by Sayda Torres RN  Pressure Reduction Techniques: heels elevated off bed  Pressure Reduction Devices:   positioning supports utilized   specialty bed utilized  Taken 1/3/2025 0800 by Sayda Torres RN  Pressure Reduction Techniques: heels elevated off bed  Pressure Reduction Devices:   positioning supports utilized   specialty bed utilized  Goal: Safe and Effective Swallow  Outcome: Not Progressing  Intervention: Promote and Optimize Fluid and Food Intake  Recent Flowsheet Documentation  Taken 1/3/2025 1600 by Sayda Torres RN  Aspiration Precautions:   oral hygiene care promoted   respiratory status monitored  Feeding/Eating Techniques: feeding assistance provided  Taken 1/3/2025 1200 by Sayda Torres RN  Aspiration Precautions:   oral hygiene care  promoted   respiratory status monitored  Feeding/Eating Techniques: feeding assistance provided  Taken 1/3/2025 0800 by Sayda Torres RN  Aspiration Precautions:   oral hygiene care promoted   respiratory status monitored  Feeding/Eating Techniques: feeding assistance provided  Goal: Effective Urinary Elimination  Outcome: Not Progressing  Intervention: Promote Effective Bladder Elimination  Recent Flowsheet Documentation  Taken 1/3/2025 1600 by Sayda Torres RN  Urinary Elimination Promotion: absorbent pad/diaper use encouraged  Taken 1/3/2025 1200 by Sayda Torres RN  Urinary Elimination Promotion: absorbent pad/diaper use encouraged  Taken 1/3/2025 0800 by Sayda Torres RN  Urinary Elimination Promotion: absorbent pad/diaper use encouraged  Goal: Optimal Cognitive Function  Outcome: Not Progressing  Intervention: Optimize Cognitive Function  Recent Flowsheet Documentation  Taken 1/3/2025 1600 by Sayda Torres RN  Sensory Stimulation Regulation:   care clustered   lighting decreased   music on   quiet environment promoted  Reorientation Measures:   calendar in view   clock in view   familiar social contact encouraged   reorientation provided  Environment Familiarity/Consistency: daily routine followed  Taken 1/3/2025 1200 by Sayda Torres RN  Sensory Stimulation Regulation:   care clustered   lighting decreased   music on   quiet environment promoted  Reorientation Measures:   calendar in view   clock in view   familiar social contact encouraged   reorientation provided  Environment Familiarity/Consistency: daily routine followed  Taken 1/3/2025 0800 by Sayda Torres RN  Sensory Stimulation Regulation:   care clustered   lighting decreased   music on   quiet environment promoted  Reorientation Measures:   calendar in view   clock in view   familiar social contact encouraged   reorientation provided  Environment Familiarity/Consistency: daily routine  followed     Problem: Mechanical Ventilation Invasive  Goal: Effective Communication  Outcome: Not Progressing  Intervention: Ensure Effective Communication  Recent Flowsheet Documentation  Taken 1/3/2025 1600 by Sayda Torres RN  Communication Enhancement Strategies:   call light answered in person   one-step directions provided   repetition utilized   verbal and visual cues paired   extra time allowed for response  Family/Support System Care:   caregiver stress acknowledged   involvement promoted   presence promoted   self-care encouraged   support provided  Trust Relationship/Rapport:   care explained   reassurance provided  Taken 1/3/2025 1200 by Sayda Torres RN  Communication Enhancement Strategies:   call light answered in person   one-step directions provided   repetition utilized   verbal and visual cues paired   extra time allowed for response  Family/Support System Care:   caregiver stress acknowledged   involvement promoted   presence promoted   self-care encouraged   support provided  Trust Relationship/Rapport:   care explained   reassurance provided  Taken 1/3/2025 0800 by Sayda Torres RN  Communication Enhancement Strategies:   call light answered in person   one-step directions provided   repetition utilized   verbal and visual cues paired   extra time allowed for response  Family/Support System Care:   caregiver stress acknowledged   involvement promoted   presence promoted   self-care encouraged   support provided  Trust Relationship/Rapport:   care explained   reassurance provided  Goal: Optimal Device Function  Outcome: Not Progressing  Intervention: Optimize Device Care and Function  Recent Flowsheet Documentation  Taken 1/3/2025 1800 by Sayda Torres RN  Oral Care:   suction provided   swabbed with sterile water   swabbed with antiseptic solution  Taken 1/3/2025 1600 by Sayda Torres RN  Airway/Ventilation Management:   airway patency maintained    calming measures promoted   pulmonary hygiene promoted  Oral Care:   suction provided   swabbed with sterile water   swabbed with antiseptic solution  Taken 1/3/2025 1400 by Sayda Torres RN  Oral Care:   suction provided   swabbed with sterile water   swabbed with antiseptic solution  Taken 1/3/2025 1200 by Sayda Torres RN  Airway/Ventilation Management:   airway patency maintained   calming measures promoted   pulmonary hygiene promoted  Oral Care:   suction provided   swabbed with sterile water   swabbed with antiseptic solution  Taken 1/3/2025 1000 by Sayda Torres RN  Oral Care:   suction provided   swabbed with sterile water   swabbed with antiseptic solution  Taken 1/3/2025 0800 by Sayda Torres RN  Airway/Ventilation Management:   airway patency maintained   calming measures promoted   pulmonary hygiene promoted  Oral Care:   suction provided   swabbed with sterile water   swabbed with antiseptic solution  Goal: Optimal Nutrition Delivery  Outcome: Not Progressing  Intervention: Optimize Nutrition Delivery  Recent Flowsheet Documentation  Taken 1/3/2025 1600 by Sayda Torres RN  Nutrition Support Management: weight trending reviewed  Taken 1/3/2025 1200 by Sayda Torres RN  Nutrition Support Management: weight trending reviewed  Taken 1/3/2025 0800 by Sayda Torres RN  Nutrition Support Management: weight trending reviewed  Goal: Absence of Device-Related Skin and Tissue Injury  Outcome: Not Progressing  Intervention: Maintain Skin and Tissue Health  Recent Flowsheet Documentation  Taken 1/3/2025 1600 by Sayda Torres RN  Device Skin Pressure Protection:   absorbent pad utilized/changed   adhesive use limited   positioning supports utilized   tubing/devices free from skin contact  Taken 1/3/2025 1200 by Sayda Torres RN  Device Skin Pressure Protection:   absorbent pad utilized/changed   adhesive use limited   positioning supports  utilized   tubing/devices free from skin contact  Taken 1/3/2025 0800 by Sayda Torres RN  Device Skin Pressure Protection:   absorbent pad utilized/changed   adhesive use limited   positioning supports utilized   tubing/devices free from skin contact  Goal: Absence of Ventilator-Induced Lung Injury  Outcome: Not Progressing  Intervention: Facilitate Lung-Protection Measures  Recent Flowsheet Documentation  Taken 1/3/2025 1600 by Sayda Torres RN  Lung Protection Measures:   fluid excess minimized   lung compliance monitored   optimal PEEP applied   ventilator synchrony promoted   ventilator waveforms monitored  Taken 1/3/2025 1200 by Sayda Torres RN  Lung Protection Measures:   fluid excess minimized   lung compliance monitored   optimal PEEP applied   ventilator synchrony promoted   ventilator waveforms monitored  Taken 1/3/2025 0800 by Sayda Torres RN  Lung Protection Measures:   fluid excess minimized   lung compliance monitored   optimal PEEP applied   ventilator synchrony promoted   ventilator waveforms monitored  Intervention: Prevent Ventilator-Associated Pneumonia  Recent Flowsheet Documentation  Taken 1/3/2025 1800 by Sayda Torres RN  Oral Care:   suction provided   swabbed with sterile water   swabbed with antiseptic solution  Head of Bed (HOB) Positioning: HOB at 30 degrees  Taken 1/3/2025 1600 by Sayda Torres RN  VAP Prevention Bundle:   HOB elevation maintained   oral care regularly provided   stress ulcer prophylaxis provided   VTE prophylaxis provided   vent circuit breaks minimized  Oral Care:   suction provided   swabbed with sterile water   swabbed with antiseptic solution  VAP Prevention Measures: completed  Taken 1/3/2025 1400 by Sayda Torres RN  Oral Care:   suction provided   swabbed with sterile water   swabbed with antiseptic solution  Taken 1/3/2025 1200 by Sayda Torres RN  VAP Prevention Bundle:   HOB elevation  maintained   oral care regularly provided   stress ulcer prophylaxis provided   VTE prophylaxis provided   vent circuit breaks minimized  Oral Care:   suction provided   swabbed with sterile water   swabbed with antiseptic solution  Head of Bed (HOB) Positioning: HOB at 30 degrees  VAP Prevention Measures: completed  Taken 1/3/2025 1000 by Sayda Torres RN  Oral Care:   suction provided   swabbed with sterile water   swabbed with antiseptic solution  Head of Bed (HOB) Positioning: HOB at 30 degrees  Taken 1/3/2025 0800 by Sayda Torres RN  VAP Prevention Bundle:   HOB elevation maintained   oral care regularly provided   stress ulcer prophylaxis provided   VTE prophylaxis provided   vent circuit breaks minimized  Oral Care:   suction provided   swabbed with sterile water   swabbed with antiseptic solution  Head of Bed (HOB) Positioning: HOB at 30 degrees  VAP Prevention Measures: completed     Problem: Fall Injury Risk  Goal: Absence of Fall and Fall-Related Injury  Outcome: Not Progressing  Intervention: Identify and Manage Contributors  Recent Flowsheet Documentation  Taken 1/3/2025 1600 by Sayda Torres RN  Medication Review/Management: medications reviewed  Taken 1/3/2025 1200 by Sayda Torres RN  Medication Review/Management: medications reviewed  Taken 1/3/2025 0800 by Sayda Torres RN  Medication Review/Management: medications reviewed  Intervention: Promote Injury-Free Environment  Recent Flowsheet Documentation  Taken 1/3/2025 1600 by Sayda Torres RN  Safety Promotion/Fall Prevention:   clutter free environment maintained   increased rounding and observation   lighting adjusted   room door open   room near nurse's station   room organization consistent   treat reversible contributory factors   treat underlying cause   safety round/check completed  Taken 1/3/2025 1200 by Sayda Torres RN  Safety Promotion/Fall Prevention:   clutter free environment  maintained   increased rounding and observation   lighting adjusted   room door open   room near nurse's station   room organization consistent   treat reversible contributory factors   treat underlying cause   safety round/check completed  Taken 1/3/2025 0800 by Sayda Torres RN  Safety Promotion/Fall Prevention:   clutter free environment maintained   increased rounding and observation   lighting adjusted   room door open   room near nurse's station   room organization consistent   treat reversible contributory factors   treat underlying cause   safety round/check completed     Problem: Restraint, Nonviolent  Goal: Absence of Harm or Injury  Outcome: Not Progressing  Intervention: Implement Least Restrictive Safety Strategies  Recent Flowsheet Documentation  Taken 1/3/2025 1600 by Sayda Torres RN  Medical Device Protection:   IV pole/bag removed from visual field   tubing secured  Taken 1/3/2025 1200 by Sayda Torres RN  Medical Device Protection:   IV pole/bag removed from visual field   tubing secured  Taken 1/3/2025 0800 by Sayda Torres RN  Medical Device Protection:   IV pole/bag removed from visual field   tubing secured  Intervention: Protect Dignity, Rights and Personal Wellbeing  Recent Flowsheet Documentation  Taken 1/3/2025 1600 by Sayda Torres RN  Trust Relationship/Rapport:   care explained   reassurance provided  Taken 1/3/2025 1200 by Sayda Torres RN  Trust Relationship/Rapport:   care explained   reassurance provided  Taken 1/3/2025 0800 by Sayda Torres RN  Trust Relationship/Rapport:   care explained   reassurance provided  Intervention: Protect Skin and Joint Integrity  Recent Flowsheet Documentation  Taken 1/3/2025 1800 by Sayda Torres RN  Body Position:   turned   heels elevated   left  Taken 1/3/2025 1600 by Sayda Torres RN  Skin Protection:   adhesive use limited   incontinence pads utilized   silicone foam dressing  in place   transparent dressing maintained  Range of Motion: ROM (range of motion) performed  Taken 1/3/2025 1400 by Sayda Torres RN  Body Position: sitting up in bed  Taken 1/3/2025 1200 by Sayda Torres RN  Body Position: sitting up in bed  Skin Protection:   adhesive use limited   incontinence pads utilized   silicone foam dressing in place   transparent dressing maintained  Range of Motion: ROM (range of motion) performed  Taken 1/3/2025 1000 by Sayda Torres RN  Body Position:   turned   heels elevated   left  Taken 1/3/2025 0800 by Sayda Torres RN  Body Position:   turned   right   heels elevated  Skin Protection:   adhesive use limited   incontinence pads utilized   silicone foam dressing in place   transparent dressing maintained  Range of Motion: ROM (range of motion) performed

## 2025-01-04 NOTE — PROGRESS NOTES
ICU Progress Note   Date of Service: 01/03/25    Assessment and Plan:  This is an 79 year old man admitted to the ICU for a posterior CVA for which he underwent balloon angioplasty. The patient was admitted to the ICU for airway protection, ventilatory management following the procedure.       Interval events:   Patient was extubated, he was re-intubated because he was not protecting his airway.      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   - Repeat MRI 12/30 showing pontine stroke   - CTA 1/1 showed basilar artery stenosis with slightly better flow than before  - Remains at high risk for recurrent stroke, up to 25% /yr per neurology  - Atorvastatin 80 mg Q pm     Cardiac:   # Hypotension (resolved)  # HTN  - Goal -160 for the acute phase  - PRN labetalol     Pulm:    # Acute hypoxic respiratory failure  - Continue vent support until patient is more awake  FiO2 (%): 40 %, Resp: 19, Inspiratory Pressure (cm H2O) (Drager Stefany): 15, Vent Mode: CMV/AC, Resp Rate (Set): 18 breaths/min, Tidal Volume (Set, mL): 530 mL, PEEP (cm H2O): 5 cmH2O, Pressure Support (cm H2O): 5 cmH2O, Resp Rate (Set): 18 breaths/min, Tidal Volume (Set, mL): 530 mL, PEEP (cm H2O): 5 cmH2O  - Patient failed extubation 1/3 and he was re-intubated in few hours, mainly for airway protection and secretion control   -Ongoing discussion with the patient's family regarding goals of care. The patient declined comfort care after extubation but also refused tracheostomy placement. It is unclear how well he understands the risks and benefits of his decision, as he was unable to articulate these back to me during our conversation on 1/3. Meanwhile, the family has opted for a comfort care approach, which  represents a change from the plan established during the family meeting on 1/3 (to proceed with a tracheostomy if the patient could not tolerate extubation).    The family shared that the patient is very outgoing, values his independence, and likely views a tracheostomy as inconsistent with his goals. This has created an apparent conflict between the family s decision and the patient s expressed preferences, compounded by uncertainty about the patient s capacity to make medical decisions. To address this, I will consult both the ethics and psychiatry teams to guide the family, patient, and medical teams through the decision-making process.      GI:   - S/p NGT placement  - TF per RD  - Pantoprazole for GI proph     # Diarrhea  - Negative stool pathogen panel  - Immodium PRN  - Rectal tube in place for fecal management     :   # CKD stage 3: stable cr, monitor  - External catheter in place.     # Hypokalemia/hypomagnesemia/hypophosphatemia  - replace     ID:  # VAP  - Patient had fever and pulmonary infiltrate  - Sputum culture with normal tom, MRSA swab negative  - On empiric pip-tazo 12/30- , will plan for a total of 7 days EOT 1/5    HEME:   # acute blood loss anemia  - Patient is on brilinta BID  - Noted to have blood clots in his resp secretions so hep s/q was stopped  - H/H dropped to 12 but has been stable since last night  - Continue to monitor H/H     Endocrine:  - Goal glucose < 180     CODE: Full Code      PPx:  1. DVT: SCDs  2. VAP: HOB 30 degrees, chlorhexidine rinse  3. Stress Ulcer: PPI  4. Restraints: Nonviolent soft upper extremity restraints required and necessary for patient safety and continued cares and good effect as patient continues to pull at necessary lines, tubes despite education and distraction. Will readdress daily.   5. Wound care - per unit routine   6. Feeding - TF  7. Family updated at the bedside     Dispo: ICU      Chart documentation was completed, in part, with [employer]  voice-recognition software. Even though reviewed, some grammatical, spelling, and word errors may remain.      Critical Care Time: 40 min.  I spent this time (excluding procedures) personally providing and directing critical care services at the bedside and on the critical care unit.     Cheryl Faulkner MD   Pulmonary and Critical Care          BP (!) 154/69   Pulse 65   Temp 97.6  F (36.4  C) (Oral)   Resp 17   Ht 1.829 m (6')   Wt 90.5 kg (199 lb 8.3 oz)   SpO2 98%   BMI 27.06 kg/m      FiO2 (%): 40 %, Resp: 17, Inspiratory Pressure (cm H2O) (Drager Stefany): 15, Vent Mode: CMV/AC, Resp Rate (Set): 18 breaths/min, Tidal Volume (Set, mL): 530 mL, PEEP (cm H2O): 5 cmH2O, Pressure Support (cm H2O): 5 cmH2O, Resp Rate (Set): 18 breaths/min, Tidal Volume (Set, mL): 530 mL, PEEP (cm H2O): 5 cmH2O          Intake/Output Summary (Last 24 hours) at 1/4/2025 1557  Last data filed at 1/4/2025 1400  Gross per 24 hour   Intake 1871.23 ml   Output 1800 ml   Net 71.23 ml           Physical Exam:  Gen: Laying in bed in NAD  HEENT: ETT noted   Resp: intubated, clear   CVS: S1 S2, regular rhythm on tele  Abd: Soft NT ND  Ext: no swelling noted  Neuro: opens eyes spontaneously, follows commands, generalized weakness     Labs: reviewed    Imaging: reviewed

## 2025-01-05 LAB
ANION GAP SERPL CALCULATED.3IONS-SCNC: 10 MMOL/L (ref 7–15)
BUN SERPL-MCNC: 52.9 MG/DL (ref 8–23)
CALCIUM SERPL-MCNC: 9.1 MG/DL (ref 8.8–10.4)
CHLORIDE SERPL-SCNC: 121 MMOL/L (ref 98–107)
CREAT SERPL-MCNC: 1.47 MG/DL (ref 0.67–1.17)
EGFRCR SERPLBLD CKD-EPI 2021: 48 ML/MIN/1.73M2
ERYTHROCYTE [DISTWIDTH] IN BLOOD BY AUTOMATED COUNT: 13.1 % (ref 10–15)
GLUCOSE BLDC GLUCOMTR-MCNC: 148 MG/DL (ref 70–99)
GLUCOSE SERPL-MCNC: 150 MG/DL (ref 70–99)
HCO3 SERPL-SCNC: 23 MMOL/L (ref 22–29)
HCT VFR BLD AUTO: 38.2 % (ref 40–53)
HGB BLD-MCNC: 12.7 G/DL (ref 13.3–17.7)
MAGNESIUM SERPL-MCNC: 2.7 MG/DL (ref 1.7–2.3)
MCH RBC QN AUTO: 32.3 PG (ref 26.5–33)
MCHC RBC AUTO-ENTMCNC: 33.2 G/DL (ref 31.5–36.5)
MCV RBC AUTO: 97 FL (ref 78–100)
PHOSPHATE SERPL-MCNC: 3 MG/DL (ref 2.5–4.5)
PLATELET # BLD AUTO: 210 10E3/UL (ref 150–450)
POTASSIUM SERPL-SCNC: 4 MMOL/L (ref 3.4–5.3)
RBC # BLD AUTO: 3.93 10E6/UL (ref 4.4–5.9)
SODIUM SERPL-SCNC: 154 MMOL/L (ref 135–145)
WBC # BLD AUTO: 11.4 10E3/UL (ref 4–11)

## 2025-01-05 PROCEDURE — 94003 VENT MGMT INPAT SUBQ DAY: CPT

## 2025-01-05 PROCEDURE — 250N000013 HC RX MED GY IP 250 OP 250 PS 637: Performed by: PHYSICIAN ASSISTANT

## 2025-01-05 PROCEDURE — 250N000009 HC RX 250: Performed by: INTERNAL MEDICINE

## 2025-01-05 PROCEDURE — 85027 COMPLETE CBC AUTOMATED: CPT | Performed by: INTERNAL MEDICINE

## 2025-01-05 PROCEDURE — 82310 ASSAY OF CALCIUM: CPT | Performed by: INTERNAL MEDICINE

## 2025-01-05 PROCEDURE — 83735 ASSAY OF MAGNESIUM: CPT | Performed by: STUDENT IN AN ORGANIZED HEALTH CARE EDUCATION/TRAINING PROGRAM

## 2025-01-05 PROCEDURE — 31622 DX BRONCHOSCOPE/WASH: CPT | Performed by: INTERNAL MEDICINE

## 2025-01-05 PROCEDURE — 0BJ08ZZ INSPECTION OF TRACHEOBRONCHIAL TREE, VIA NATURAL OR ARTIFICIAL OPENING ENDOSCOPIC: ICD-10-PCS | Performed by: INTERNAL MEDICINE

## 2025-01-05 PROCEDURE — 99291 CRITICAL CARE FIRST HOUR: CPT | Mod: 25 | Performed by: INTERNAL MEDICINE

## 2025-01-05 PROCEDURE — 250N000013 HC RX MED GY IP 250 OP 250 PS 637: Performed by: STUDENT IN AN ORGANIZED HEALTH CARE EDUCATION/TRAINING PROGRAM

## 2025-01-05 PROCEDURE — 250N000011 HC RX IP 250 OP 636: Performed by: INTERNAL MEDICINE

## 2025-01-05 PROCEDURE — 999N000157 HC STATISTIC RCP TIME EA 10 MIN

## 2025-01-05 PROCEDURE — 84100 ASSAY OF PHOSPHORUS: CPT | Performed by: STUDENT IN AN ORGANIZED HEALTH CARE EDUCATION/TRAINING PROGRAM

## 2025-01-05 PROCEDURE — 250N000013 HC RX MED GY IP 250 OP 250 PS 637: Performed by: PSYCHIATRY & NEUROLOGY

## 2025-01-05 PROCEDURE — 36415 COLL VENOUS BLD VENIPUNCTURE: CPT | Performed by: INTERNAL MEDICINE

## 2025-01-05 PROCEDURE — 99291 CRITICAL CARE FIRST HOUR: CPT | Mod: FS | Performed by: PHYSICIAN ASSISTANT

## 2025-01-05 PROCEDURE — 94640 AIRWAY INHALATION TREATMENT: CPT

## 2025-01-05 PROCEDURE — 120N000004 HC R&B MS OVERFLOW

## 2025-01-05 PROCEDURE — 250N000013 HC RX MED GY IP 250 OP 250 PS 637: Performed by: SURGERY

## 2025-01-05 PROCEDURE — 84295 ASSAY OF SERUM SODIUM: CPT | Performed by: INTERNAL MEDICINE

## 2025-01-05 RX ORDER — FENTANYL CITRATE 0.05 MG/ML
150 INJECTION, SOLUTION INTRAMUSCULAR; INTRAVENOUS ONCE
Status: COMPLETED | OUTPATIENT
Start: 2025-01-05 | End: 2025-01-05

## 2025-01-05 RX ORDER — ALBUTEROL SULFATE 0.83 MG/ML
2.5 SOLUTION RESPIRATORY (INHALATION)
Status: DISCONTINUED | OUTPATIENT
Start: 2025-01-05 | End: 2025-01-06

## 2025-01-05 RX ORDER — ACETYLCYSTEINE 200 MG/ML
2 SOLUTION ORAL; RESPIRATORY (INHALATION) EVERY 6 HOURS
Status: DISCONTINUED | OUTPATIENT
Start: 2025-01-05 | End: 2025-01-06

## 2025-01-05 RX ADMIN — MIDAZOLAM 3 MG: 1 INJECTION INTRAMUSCULAR; INTRAVENOUS at 14:21

## 2025-01-05 RX ADMIN — PIPERACILLIN AND TAZOBACTAM 4.5 G: 4; .5 INJECTION, POWDER, FOR SOLUTION INTRAVENOUS at 18:06

## 2025-01-05 RX ADMIN — ALBUTEROL SULFATE 2.5 MG: 2.5 SOLUTION RESPIRATORY (INHALATION) at 19:15

## 2025-01-05 RX ADMIN — Medication 40 MG: at 07:46

## 2025-01-05 RX ADMIN — ATORVASTATIN CALCIUM 80 MG: 40 TABLET, FILM COATED ORAL at 19:40

## 2025-01-05 RX ADMIN — TICAGRELOR 90 MG: 90 TABLET ORAL at 07:46

## 2025-01-05 RX ADMIN — FENTANYL CITRATE 150 MCG: 50 INJECTION, SOLUTION INTRAMUSCULAR; INTRAVENOUS at 14:22

## 2025-01-05 RX ADMIN — TICAGRELOR 90 MG: 90 TABLET ORAL at 19:40

## 2025-01-05 RX ADMIN — CHLORHEXIDINE GLUCONATE 15 ML: 1.2 SOLUTION ORAL at 19:40

## 2025-01-05 RX ADMIN — CILOSTAZOL 100 MG: 100 TABLET ORAL at 19:40

## 2025-01-05 RX ADMIN — CHLORHEXIDINE GLUCONATE 15 ML: 1.2 SOLUTION ORAL at 07:43

## 2025-01-05 RX ADMIN — PIPERACILLIN AND TAZOBACTAM 4.5 G: 4; .5 INJECTION, POWDER, FOR SOLUTION INTRAVENOUS at 11:48

## 2025-01-05 RX ADMIN — PIPERACILLIN AND TAZOBACTAM 4.5 G: 4; .5 INJECTION, POWDER, FOR SOLUTION INTRAVENOUS at 00:31

## 2025-01-05 RX ADMIN — ACETYLCYSTEINE 2 ML: 200 SOLUTION ORAL; RESPIRATORY (INHALATION) at 13:15

## 2025-01-05 RX ADMIN — DEXMEDETOMIDINE HYDROCHLORIDE 0.1 MCG/KG/HR: 400 INJECTION INTRAVENOUS at 18:14

## 2025-01-05 RX ADMIN — ALBUTEROL SULFATE 2.5 MG: 2.5 SOLUTION RESPIRATORY (INHALATION) at 13:15

## 2025-01-05 RX ADMIN — ACETYLCYSTEINE 2 ML: 200 SOLUTION ORAL; RESPIRATORY (INHALATION) at 19:16

## 2025-01-05 RX ADMIN — CILOSTAZOL 100 MG: 100 TABLET ORAL at 07:46

## 2025-01-05 RX ADMIN — PIPERACILLIN AND TAZOBACTAM 4.5 G: 4; .5 INJECTION, POWDER, FOR SOLUTION INTRAVENOUS at 06:02

## 2025-01-05 ASSESSMENT — ACTIVITIES OF DAILY LIVING (ADL)
ADLS_ACUITY_SCORE: 54
ADLS_ACUITY_SCORE: 58
ADLS_ACUITY_SCORE: 54
ADLS_ACUITY_SCORE: 58
ADLS_ACUITY_SCORE: 54
ADLS_ACUITY_SCORE: 58
ADLS_ACUITY_SCORE: 54
ADLS_ACUITY_SCORE: 54
ADLS_ACUITY_SCORE: 58
ADLS_ACUITY_SCORE: 54
ADLS_ACUITY_SCORE: 54
ADLS_ACUITY_SCORE: 58
ADLS_ACUITY_SCORE: 54
ADLS_ACUITY_SCORE: 58
ADLS_ACUITY_SCORE: 58
ADLS_ACUITY_SCORE: 54

## 2025-01-05 NOTE — PROGRESS NOTES
The purpose of this note, including any accompanying recommendation, is advisory only concerning ethical principles and considerations related to this case. Determination of appropriate patient care decisions is the responsibility of the clinical team in consultation with patients and their decision makers and relevant institutional policy. Legal and policy questions should be addressed with Risk Management.     This note is to document ethics involvement.     I discussed Mr. Mejia condition and prognosis with Dr. Cheryl Faulkner and Dr. Abiola Forte.     The primary ethics issue concerns uncertainty regarding goals of care, with specific implications for whether to proceed with tracheostomy. There is uncertainty regarding goals of care because of expressions of values and/or choices by Mr. Aguilar, that are internally inconsistent or contradictory given the circumstances. From the ethics standpoint, there is a risk of either proceeding with a relatively risky trach placement that would be superficially involuntary, but appears to align with a Mr. Curtis's expressed goals; or in contrast, a risk of honoring a refusal of the trach but in ways that contradict an expression of the patient's goals/values, (even though the goals and values may be unattainable or based on a misunderstanding or denial of prognosis/likely outcomes.)     Mr. Sahnis family is involved and appear to be appropriate surrogate decision makers if needed. I plan on meeting with Mr. Curtis's family afternoon of 1/6/2025 to discuss circumstances when surrogate decision making would be ethically appropriate, and standards for effective surrogate decision making.     Additional evaluation of Mr. Aguilar' decision making capacity, with attention to his ability to appreciate his condition and prognosis, and to effectively align decisions about treatment vs. non-treatment to goals and values, may be necessary to ensure that reliance on surrogate decision makers is  "appropriate.       Andrea Higgins JD, MPH, MA, YANCY-C                                   Clinical Ethics Lead, [facility]: \"Clinical Ethics Consultation Service\" or [facility] \"Ethics\" consult pager [phone].            "

## 2025-01-05 NOTE — PROGRESS NOTES
ICU Progress Note   Date of Service: 01/03/25    Assessment and Plan:  This is an 79 year old man admitted to the ICU for a posterior CVA for which he underwent balloon angioplasty. The patient was admitted to the ICU for airway protection, ventilatory management following the procedure.       Interval events:   Patient has increased ETT secretions.  Thick and profuse.  He is alert and following commands.    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   - Repeat MRI 12/30 showing pontine stroke   - CTA 1/1 showed basilar artery stenosis with slightly better flow than before  - Remains at high risk for recurrent stroke, up to 25% /yr per neurology  - Atorvastatin 80 mg Q pm     Cardiac:   # Hypotension (resolved)  # HTN  - Goal -160 for the acute phase  - PRN labetalol     Pulm:    # Acute hypoxic respiratory failure  - Continue vent support until patient is more awake  FiO2 (%): 40 %, Resp: 23, Vent Mode: CMV/AC, Resp Rate (Set): 18 breaths/min, Tidal Volume (Set, mL): 530 mL, PEEP (cm H2O): 5 cmH2O, Resp Rate (Set): 18 breaths/min, Tidal Volume (Set, mL): 530 mL, PEEP (cm H2O): 5 cmH2O  -Increased secretions, CXR is stable, bronchoscopy is unremarkable, start albuterol and mucomyst   - Patient failed extubation 1/3 and he was re-intubated in few hours, mainly for airway protection and secretion control   -Ongoing discussion with the patient's family regarding goals of care. The patient declined comfort care after extubation but also refused tracheostomy placement. It is unclear how well he understands the risks and benefits of his decision, as he was unable to articulate these back to me during our conversation on 1/3. Meanwhile, the family has opted for a comfort care  approach, which represents a change from the plan established during the family meeting on 1/3 (to proceed with a tracheostomy if the patient could not tolerate extubation).    The family shared that the patient is very outgoing, values his independence, and likely views a tracheostomy as inconsistent with his goals. This has created an apparent conflict between the family s decision and the patient s expressed preferences, compounded by uncertainty about the patient s capacity to make medical decisions. To address this, consulted ethics and psychiatry teams to guide the family, patient, and medical teams through the decision-making process.  Ethics will have a meeting with the family on 1/6 PM.      GI:   - S/p NGT placement  - TF per RD  - Pantoprazole for GI proph     # Diarrhea  - Negative stool pathogen panel  - Immodium PRN  - Rectal tube in place for fecal management     :   # CKD stage 3: stable cr, monitor  - External catheter in place.     # Hypokalemia/hypomagnesemia/hypophosphatemia  - replace     ID:  # VAP  - Patient had fever and pulmonary infiltrate  - Sputum culture with normal tom, MRSA swab negative  - On empiric pip-tazo 12/30- , will plan for a total of 7 days EOT 1/5    HEME:   # acute blood loss anemia  - Patient is on brilinta BID  - Noted to have blood clots in his resp secretions so hep s/q was stopped  - H/H dropped to 12 but has been stable since last night  - Continue to monitor H/H     Endocrine:  - Goal glucose < 180     CODE: Full Code      PPx:  1. DVT: SCDs  2. VAP: HOB 30 degrees, chlorhexidine rinse  3. Stress Ulcer: PPI  4. Restraints: Nonviolent soft upper extremity restraints required and necessary for patient safety and continued cares and good effect as patient continues to pull at necessary lines, tubes despite education and distraction. Will readdress daily.   5. Wound care - per unit routine   6. Feeding - TF  7. Family updated at the bedside     Dispo: ICU      Chart  documentation was completed, in part, with [employer] voice-recognition software. Even though reviewed, some grammatical, spelling, and word errors may remain.      Critical Care Time: 40 min.  I spent this time (excluding procedures) personally providing and directing critical care services at the bedside and on the critical care unit.     Cheryl Faulkner MD   Pulmonary and Critical Care          BP (!) 148/66   Pulse 70   Temp 97.9  F (36.6  C) (Axillary)   Resp 23   Ht 1.829 m (6')   Wt 89.7 kg (197 lb 12 oz)   SpO2 95%   BMI 26.82 kg/m      FiO2 (%): 40 %, Resp: 23, Vent Mode: CMV/AC, Resp Rate (Set): 18 breaths/min, Tidal Volume (Set, mL): 530 mL, PEEP (cm H2O): 5 cmH2O, Resp Rate (Set): 18 breaths/min, Tidal Volume (Set, mL): 530 mL, PEEP (cm H2O): 5 cmH2O          Intake/Output Summary (Last 24 hours) at 1/4/2025 1557  Last data filed at 1/4/2025 1400  Gross per 24 hour   Intake 1871.23 ml   Output 1800 ml   Net 71.23 ml           Physical Exam:  Gen: Laying in bed in NAD  HEENT: ETT noted   Resp: intubated, clear   CVS: S1 S2, regular rhythm on tele  Abd: Soft NT ND  Ext: no swelling noted  Neuro: opens eyes spontaneously, follows commands, generalized weakness     Labs: reviewed    Imaging: reviewed

## 2025-01-05 NOTE — PROGRESS NOTES
[facility]    Vascular Neurology Progress Note    Interval History     Temp: Afebrile  SBP range: 105-158  Heart rate: WNL  Sodium: 154  Glucose: 148  WBC 10.1>11.4  Intubated   Sedated: precedex 1 mcg/kg/hr  On Zosyn     Acute events: Palliative/ethics consults pending.    Exam today: No significant change (see below)    Hospital Course     Chief complaint: Dizziness (Sudden onset dizziness/sweating/family reported some slurred speech that was brief and gone by the time medics arrived. Was just sitting when episode happened.  No dizziness w change of position.  Off balance. +5 strength. LKW 1140)    Elio Aguilar is a 79 year old male with pertinent past medical history of a prior left corona radiata stroke in 2010 (no significant ICAD at that time), found to have PFO s/p closure at [place] per patient.  History of HTN, HLD, history of adult onset Reyes syndrome, DM2, does not see a doctor regularly.  Not on PTA ASA.     He presented to the emergency department 12/25/2024 with lightheadedness, diaphoresis, dysarthria x 5 minutes.  CTA showed severe basilar stenosis/occlusion beginning at V4/basilar junction.  No clear focal weakness or facial droop.  He was initially started on DAPT and then transition to heparin drip + ASA due to recurrent transient symptoms.  Initial MRI 12/26 negative for acute pathology.  Repeat CTA 12/27 showed some improvement in basilar narrowing.  Had new dysarthria so repeat MRI performed 12/28 and showed probable small pontine infarct.  MRA unchanged.     12/29/24 stroke code activated for progressive worsening of symptoms, dysarthria, hypophonia as well as new right arm/leg ataxia and right hemiparesis.  Underwent emergent diagnostic cerebral angiogram s/p angioplasty x 3 for severe multifocal basilar artery stenosis (required intubation due to difficulty managing secretions).   12/30/24 newly febrile/leukocytosis, infectious workup started   1/1/25 CTA  showed basilar artery stenosis with slightly better flow than before   1/2/25 had GOC discussion family unsure of trach/PEG versus trial extubation then CMO if fails  1/3/25 patient wanted to attempt extubation, was unable to protect airway, reintubated, palliative care consulted for further GOC discussion  1/4/25 had GOC discussion with family, patient indicated he does not want to be intubated or receive trach/PEG but wants to continue full medical care and believes he will have recovery.  Given concern for potential decision making capacity and family concern of the level of suffering he may endure, ethics and palliative teams were consulted.    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 He is s/p balloon Angioplasty x3 of the multifocal proximal to middle basilar artery, TICI 3 recanalization   #Severe bulbar weakness, dysphagia 2/2 above s/p intubation  # Hypernatremia  -Neuro checks and vitals every 2 hours  -continue Brilinta 90 mg twice daily and cilostazol 100 mg twice daily, duration per neuro IR  -No aspirin due to history of adult onset Reyes syndrome   -Appreciate ventilator management per intensivist team, wean sedation as tolerated   -telemetry  -ongoing goals of care discussion, appreciate palliative care and ethics recommendations  -PT/OT/SPT, appreciate dietician recommendations on NGT/tube feeds   -Euthermia, euglycemia (BG <180), eunatremia  -Stroke Education, education provided on BEFAST stroke symptoms      #Ventilatory associated pneumonia (fever, leukocytosis and pulmonary infiltrates)  -appreciate intensivist evaluation and management, currently on zosyn  -Respiratory culture normal tom, blood cultures NGTD      #Hypertension  -appreciate management per primary team  -Inpatient SBP goal 120-160  -Outpatient BP goal <140/90, with tighter control if tolerated  -Recommend home blood pressure monitoring twice  "daily in AM and PM, keep log and bring to medical visits     #Hyperlipidemia, above goal  -Atorvastatin 80 mg daily  -Recommend LDL goal 40-70, <40 increases risk of intracranial hemorrhage  -Recommend Mediterranean diet      #Diabetes mellitus type 2  -appreciate management per primary team  -blood glucose monitoring, long term A1c goal <7%    DVT Prophylaxis: okay with pharmacologic VTE ppx from NCC perspective, defer to primary team    Patient Follow-up    - final recommendation pending work-up   -PM&R in [place]   -Follow-up in [place] at [facility] with Dr. Wan Rowan (referral made) in 6-8 weeks     We will continue to follow.     Discussed with vascular neurologist, Dr. Jann Betancourt PA-C  Vascular Neurology    To page me or covering stroke neurology team member, click here: [facility]  Choose \"On Call\" tab at top, then select \"NEUROLOGY/ALL SITES\" from middle drop-down box, press Enter, then look for \"stroke\" or \"telestroke\" for your site.    Physical Examination     Temp: 97.9  F (36.6  C) Temp src: Axillary BP: (!) 158/78 Pulse: 65   Resp: 22 SpO2: 100 % O2 Device: Mechanical Ventilator      Neurocritical Care Physical Examination:  General: Laying in bed, mechanical ventilation, eyes open as this provider enters the room  Resp: mechanical ventilation, thick secretions,suctioned twice     Neuro:  Mental status: alert, minimal sedation, able to nod yes/no to some questions, able to mild a few words but not able to understand with ETT, following some commands  Cranial nerves: Left gaze preference, able to cross midline and track, Pupils 2mm and reactive to light, inconsistently blinks to threat bilaterally, cough intact to suction with thick secretions, face grossly symmetric with ETT  Motor: gives R thumbs up with command, able to push and pull against this provider with his right hand but not able to lift antigravity, today is able to slightly wiggle the first 2 fingers " of his left hand on command, not able to wiggle his toes despite prompts but is able to slightly withdrawal to noxious RLE, slight withdrawal to noxious of left lower extremity, not able to follow commands to wiggle toes  Reflexes: Deferred  Sensory: see motor  Coordination:not able to obtain due to AMS/weakness   Gait: Unable to assess due to mental status.      Imaging/Labs   (Bolded imaging and labs new and/or personally reviewed or re-reviewed by me today)    MRI and/or Head CT MRI brain 12/30: Acute pontine and scattered right cerebellar/occipital lobe ischemic infarcts, patchy meningeal enhancement throughout right cerebellar hemisphere, nonspecific but may be related to recent catheter directed angiogram  MRI 12/28: Indeterminate diffusion hyperintensity in the ventral armin, without a definite T2 or ADC correlate. Findings may reflect evolving ischemic change or artifact from the adjacent skull base.   Initial MRI: No acute stroke   Intracranial Vasculature MRA 12/28: Unchanged     CTA 12/27: Formally read as stable from prior, however feel there is some improvement in basilar stenosis on stroke team review.     CTA: Multifocal high-grade stenosis/near occlusion of the proximal/mid basilar, bilateral PCA stenosis, moderate left M1 stenosis, moderate supraclinoid right ICA stenosis     MRA: Absent flow related contrast at the basilar artery, multifocal iCAD   Cervical Vasculature CTA: 40 to 50% right cervical ICA stenosis      Echocardiogram EF 60 to 65%, no regional wall motion abnormalities, normal left atrial size   EKG/Telemetry SR   Other Testing P2Y12: 7     Diagnostic cerebral angiogram:  successful Balloon Angioplasty x3 of the multifocal proximal to middle basilar artery stenosis TICI 3 recanalization. With residual stenosis measuring per WASID criteria. pending formal report    Respiratory culture: 4+ normal tom (final result)  Blood cultures:  NGTD      LDL  12/25/2024: 131 mg/dL   A1C   12/25/2024: 5.1 %   Troponin No lab value available in past 48 hrs     Other labs reviewed by me today:  Magnesium, phosphorus, CBC, BMP    Time Spent on this Encounter   Billing: I personally examined and evaluated the patient today. At the time of my evaluation and management the patient was in critical condition today due to stroke code. I personally managed review of chart, medical record, meds, imaging, history, exam, and discussion with attending regarding plan and documentation. I spent a total of 35 minutes providing critical care services, evaluating the patient, directing care and reviewing laboratory values and radiologic reports.

## 2025-01-05 NOTE — PROCEDURES
PROCEDURE:   Bronchoscopy     INDICATION:  Mucous secretions     PROCEDURE :   Cheryl Faulkner MD      CONSENT:  The patient's medical record has been reviewed.  The indication for the procedure was reviewed.  The necessary history and physical examination was performed and reviewed.  The risks, benefits and alternatives of the procedure were discussed with the the patient's representative (wife) in detail and she had the opportunity to ask questions.  Verbal nformed consent was obtained.  The proposed procedure and the patient's identification were verified prior to the procedure by the physician and the nurse, respiratory therapist.    UNIVERSAL PROTOCOL: Patient Identification was verified, time out was performed. Imaging data reviewed. Barrier precaution done: Hands washed, mask, gloves, gown, and eye protection all used.     MEDICATIONS:   3 mg versed  150 mcg fentanyl    PROCEDURE: Patient monitored during entire procedure. A flexible bronchoscope was inserted through patients ET tube.  The tube was above the trachea.  The sonali was sharp.  An airway inspection was done to the subsegmental level:     Findings:   - No significant mucous secretions noted   - bloody secretion in the bronchus intermedius but no active bleeding       COMPLICATIONS: None    Cheryl Faulkner MD, 1/5/2025, 3:19 PM  Pulmonology   Department of Pulmonary, Allergy, Critical Care & Sleep Medicine   [phone]

## 2025-01-05 NOTE — PLAN OF CARE
Problem: Adult Inpatient Plan of Care  Goal: Patient-Specific Goal (Individualized)    Outcome: Not Progressing  Flowsheets (Taken 1/5/2025 1732)  Individualized Care Needs: Pt answers simple questions with nod, yes and no.  Cam negative today, interacting with family.  Copious amount of thick secretions from ET tube, aggresive suctioning and coughing mobilized secretions, bedside bronch performed.  Neuros remain stable, able to squeeze right hand, withdraws all other extremities.  Plan to meet with ethics and psychiatry Monday.  Goal: Absence of Hospital-Acquired Illness or Injury  Outcome: Not Progressing  Intervention: Identify and Manage Fall Risk  Recent Flowsheet Documentation  Taken 1/5/2025 1600 by Makenna Dorado RN  Safety Promotion/Fall Prevention:   clutter free environment maintained   increase visualization of patient   lighting adjusted   room near nurse's station  Taken 1/5/2025 1200 by Makenna Dorado RN  Safety Promotion/Fall Prevention:   clutter free environment maintained   increase visualization of patient   lighting adjusted   room near nurse's station  Taken 1/5/2025 0800 by Makenna Dorado RN  Safety Promotion/Fall Prevention:   clutter free environment maintained   increase visualization of patient   lighting adjusted   room near nurse's station  Intervention: Prevent Skin Injury  Recent Flowsheet Documentation  Taken 1/5/2025 1600 by Makenna Dorado RN  Body Position:   turned   heels elevated   right  Skin Protection:   adhesive use limited   hydrocolloids used   incontinence pads utilized  Taken 1/5/2025 1400 by Makenna Dorado RN  Body Position:   turned   left   heels elevated  Taken 1/5/2025 1200 by Makenna Dorado RN  Body Position:   turned   heels elevated   right  Skin Protection:   adhesive use limited   hydrocolloids used   incontinence pads utilized  Taken 1/5/2025 1000 by Makenna Dorado RN  Body Position:   turned   left   heels elevated  Taken 1/5/2025  0800 by Makenna Dorado RN  Body Position:   turned   left   heels elevated  Skin Protection:   adhesive use limited   hydrocolloids used   incontinence pads utilized  Intervention: Prevent and Manage VTE (Venous Thromboembolism) Risk  Recent Flowsheet Documentation  Taken 1/5/2025 1600 by Makenna Dorado RN  VTE Prevention/Management: SCDs on (sequential compression devices)  Taken 1/5/2025 1200 by Makenna Dorado RN  VTE Prevention/Management: SCDs on (sequential compression devices)  Taken 1/5/2025 0800 by Makenna Dorado RN  VTE Prevention/Management: SCDs on (sequential compression devices)  Intervention: Prevent Infection  Recent Flowsheet Documentation  Taken 1/5/2025 1600 by Makenna Dorado RN  Infection Prevention:   environmental surveillance performed   equipment surfaces disinfected   hand hygiene promoted   rest/sleep promoted   single patient room provided  Taken 1/5/2025 1200 by Makenna Dorado RN  Infection Prevention:   environmental surveillance performed   equipment surfaces disinfected   hand hygiene promoted   rest/sleep promoted   single patient room provided  Taken 1/5/2025 0800 by Makenna Dorado RN  Infection Prevention:   environmental surveillance performed   equipment surfaces disinfected   hand hygiene promoted   rest/sleep promoted   single patient room provided  Goal: Optimal Comfort and Wellbeing  Outcome: Not Progressing  Intervention: Provide Person-Centered Care  Recent Flowsheet Documentation  Taken 1/5/2025 1600 by Makenna Dorado RN  Trust Relationship/Rapport:   care explained   reassurance provided  Taken 1/5/2025 1400 by Makenna Dorado RN  Trust Relationship/Rapport:   care explained   reassurance provided  Taken 1/5/2025 1200 by Makenna Dorado RN  Trust Relationship/Rapport:   care explained   reassurance provided  Taken 1/5/2025 1000 by Makenna Dorado RN  Trust Relationship/Rapport:   care explained   reassurance provided  Taken 1/5/2025  0800 by Makenna Dorado RN  Trust Relationship/Rapport:   care explained   reassurance provided  Goal: Readiness for Transition of Care  Outcome: Not Progressing  Goal: Plan of Care Review  Description: The Plan of Care Review/Shift note should be completed every shift.  The Outcome Evaluation is a brief statement about your assessment that the patient is improving, declining, or no change.  This information will be displayed automatically on your shift  note.  Outcome: Not Progressing     Problem: Stroke, Ischemic (Includes Transient Ischemic Attack)  Goal: Optimal Coping  Outcome: Not Progressing  Intervention: Support Psychosocial Response to Stroke  Recent Flowsheet Documentation  Taken 1/5/2025 1600 by Makenna Dorado RN  Supportive Measures: relaxation techniques promoted  Taken 1/5/2025 1400 by Makenna Dorado RN  Supportive Measures: relaxation techniques promoted  Taken 1/5/2025 1200 by Makenna Dorado RN  Supportive Measures: relaxation techniques promoted  Taken 1/5/2025 1000 by Makenna Dorado RN  Supportive Measures: relaxation techniques promoted  Taken 1/5/2025 0800 by Makenna Dorado RN  Supportive Measures: relaxation techniques promoted  Goal: Effective Bowel Elimination  Outcome: Not Progressing  Goal: Optimal Cerebral Tissue Perfusion  Outcome: Not Progressing  Intervention: Protect and Optimize Cerebral Perfusion  Recent Flowsheet Documentation  Taken 1/5/2025 1600 by Makenna Dorado RN  Sensory Stimulation Regulation:   care clustered   music on  Taken 1/5/2025 1400 by Makenna Dorado RN  Sensory Stimulation Regulation:   care clustered   music on  Taken 1/5/2025 1200 by Makenna Dorado RN  Sensory Stimulation Regulation:   care clustered   music on  Taken 1/5/2025 1000 by Makenna Dorado RN  Sensory Stimulation Regulation:   care clustered   music on  Taken 1/5/2025 0800 by Makenna Dorado RN  Sensory Stimulation Regulation:   care clustered   music on  Goal:  Improved Communication Skills  Outcome: Not Progressing  Intervention: Optimize Communication Skills  Recent Flowsheet Documentation  Taken 1/5/2025 1600 by Makenna Dorado RN  Communication Enhancement Strategies:   extra time allowed for response   nonverbal strategies used  Taken 1/5/2025 1400 by Makenna Dorado RN  Communication Enhancement Strategies:   extra time allowed for response   nonverbal strategies used  Taken 1/5/2025 1200 by Makenna Dorado RN  Communication Enhancement Strategies:   extra time allowed for response   nonverbal strategies used  Taken 1/5/2025 1000 by Makenna Dorado RN  Communication Enhancement Strategies:   extra time allowed for response   nonverbal strategies used  Taken 1/5/2025 0800 by Makenna Dorado RN  Communication Enhancement Strategies:   extra time allowed for response   nonverbal strategies used  Goal: Optimal Functional Ability  Outcome: Not Progressing  Intervention: Optimize Functional Ability  Recent Flowsheet Documentation  Taken 1/5/2025 1600 by Makenna Dorado RN  Activity Management: bedrest  Taken 1/5/2025 1400 by Makenna Dorado RN  Activity Management: bedrest  Taken 1/5/2025 1200 by Makenna Dorado RN  Activity Management: bedrest  Taken 1/5/2025 1000 by Makenna Dorado RN  Activity Management: bedrest  Taken 1/5/2025 0800 by Makenna Dorado RN  Activity Management: bedrest  Goal: Optimal Nutrition Intake  Outcome: Not Progressing  Goal: Effective Oxygenation and Ventilation  Outcome: Not Progressing  Intervention: Optimize Oxygenation and Ventilation  Recent Flowsheet Documentation  Taken 1/5/2025 1600 by Makenna Dorado RN  Head of Bed (HOB) Positioning: HOB at 30 degrees  Taken 1/5/2025 1400 by Makenna Dorado RN  Head of Bed (HOB) Positioning: HOB at 30 degrees  Taken 1/5/2025 1200 by Makenna Dorado RN  Head of Bed (HOB) Positioning: HOB at 30 degrees  Taken 1/5/2025 1000 by Makenna Dorado RN  Head of Bed (HOB)  Positioning: HOB at 30 degrees  Taken 1/5/2025 0800 by Makenna Dorado RN  Head of Bed (HOB) Positioning: HOB at 30 degrees  Goal: Improved Sensorimotor Function  Outcome: Not Progressing  Intervention: Optimize Range of Motion, Motor Control and Function  Recent Flowsheet Documentation  Taken 1/5/2025 1600 by Makenna Dorado RN  Range of Motion: ROM (range of motion) performed  Taken 1/5/2025 1200 by Makenna Dorado RN  Range of Motion: ROM (range of motion) performed  Taken 1/5/2025 0800 by Makenna Dorado RN  Range of Motion: ROM (range of motion) performed  Intervention: Optimize Sensory and Perceptual Ability  Recent Flowsheet Documentation  Taken 1/5/2025 1600 by Makenna Dorado RN  Pressure Reduction Techniques: heels elevated off bed  Pressure Reduction Devices: heel offloading device utilized  Taken 1/5/2025 1200 by Makenna Dorado RN  Pressure Reduction Techniques: heels elevated off bed  Pressure Reduction Devices: heel offloading device utilized  Taken 1/5/2025 0800 by Makenna Dorado RN  Pressure Reduction Techniques: heels elevated off bed  Pressure Reduction Devices: heel offloading device utilized  Goal: Safe and Effective Swallow  Outcome: Not Progressing  Goal: Effective Urinary Elimination  Outcome: Not Progressing  Goal: Optimal Cognitive Function  Outcome: Not Progressing  Intervention: Optimize Cognitive Function  Recent Flowsheet Documentation  Taken 1/5/2025 1600 by Makenna Dorado RN  Sensory Stimulation Regulation:   care clustered   music on  Reorientation Measures:   clock in view   reorientation provided  Environment Familiarity/Consistency: daily routine followed  Taken 1/5/2025 1400 by Makenna Dorado RN  Sensory Stimulation Regulation:   care clustered   music on  Reorientation Measures:   clock in view   reorientation provided  Environment Familiarity/Consistency: daily routine followed  Taken 1/5/2025 1200 by Makenna Dorado RN  Sensory Stimulation  Regulation:   care clustered   music on  Reorientation Measures:   clock in view   reorientation provided  Environment Familiarity/Consistency: daily routine followed  Taken 1/5/2025 1000 by Makenna Dorado RN  Sensory Stimulation Regulation:   care clustered   music on  Reorientation Measures:   clock in view   reorientation provided  Environment Familiarity/Consistency: daily routine followed  Taken 1/5/2025 0800 by Makenna Dorado RN  Sensory Stimulation Regulation:   care clustered   music on  Reorientation Measures:   clock in view   reorientation provided  Environment Familiarity/Consistency: daily routine followed     Problem: Mechanical Ventilation Invasive  Goal: Effective Communication  Outcome: Not Progressing  Intervention: Ensure Effective Communication  Recent Flowsheet Documentation  Taken 1/5/2025 1600 by Makenna Dorado RN  Communication Enhancement Strategies:   extra time allowed for response   nonverbal strategies used  Trust Relationship/Rapport:   care explained   reassurance provided  Taken 1/5/2025 1400 by Makenna Dorado RN  Communication Enhancement Strategies:   extra time allowed for response   nonverbal strategies used  Trust Relationship/Rapport:   care explained   reassurance provided  Taken 1/5/2025 1200 by Makenna Dorado RN  Communication Enhancement Strategies:   extra time allowed for response   nonverbal strategies used  Trust Relationship/Rapport:   care explained   reassurance provided  Taken 1/5/2025 1000 by Makenna Dorado RN  Communication Enhancement Strategies:   extra time allowed for response   nonverbal strategies used  Trust Relationship/Rapport:   care explained   reassurance provided  Taken 1/5/2025 0800 by Makenna Dorado RN  Communication Enhancement Strategies:   extra time allowed for response   nonverbal strategies used  Trust Relationship/Rapport:   care explained   reassurance provided  Goal: Optimal Device Function  Outcome: Not  Progressing  Intervention: Optimize Device Care and Function  Recent Flowsheet Documentation  Taken 1/5/2025 1600 by Makenna Dorado RN  Airway Safety Measures:   all equipment/monitors on and audible   suction at bedside   suction equipment  Oral Care:   oral rinse provided   swabbed with antiseptic solution  Taken 1/5/2025 1200 by Makenna Dorado RN  Airway Safety Measures:   all equipment/monitors on and audible   suction at bedside   suction equipment  Oral Care:   oral rinse provided   swabbed with antiseptic solution  Taken 1/5/2025 0800 by Makenna Dorado RN  Airway Safety Measures:   all equipment/monitors on and audible   suction at bedside   suction equipment  Oral Care:   oral rinse provided   swabbed with antiseptic solution  Goal: Optimal Nutrition Delivery  Outcome: Not Progressing  Goal: Absence of Device-Related Skin and Tissue Injury  Outcome: Not Progressing  Intervention: Maintain Skin and Tissue Health  Recent Flowsheet Documentation  Taken 1/5/2025 1600 by Makenna Dorado RN  Device Skin Pressure Protection:   positioning supports utilized   pressure points protected  Taken 1/5/2025 1200 by Makenna Dorado RN  Device Skin Pressure Protection:   positioning supports utilized   pressure points protected  Taken 1/5/2025 0800 by Makenna Dorado RN  Device Skin Pressure Protection:   positioning supports utilized   pressure points protected  Goal: Absence of Ventilator-Induced Lung Injury  Outcome: Not Progressing  Intervention: Prevent Ventilator-Associated Pneumonia  Recent Flowsheet Documentation  Taken 1/5/2025 1600 by Makenna Dorado RN  VAP Prevention Bundle:   HOB elevation maintained   oral care regularly provided   readiness to extubate assessed   sedation interruption performed   vent circuit breaks minimized   stress ulcer prophylaxis provided   VTE prophylaxis provided  Oral Care:   oral rinse provided   swabbed with antiseptic solution  Head of Bed (HOB) Positioning:  HOB at 30 degrees  VAP Prevention Contraindications: (await decision making capacity before work toward extubation again.)   no/minimum sedation required   other (see comments)  VAP Prevention Measures: completed  Taken 1/5/2025 1400 by Makenna Dorado RN  Head of Bed (HOB) Positioning: HOB at 30 degrees  Taken 1/5/2025 1200 by Makenna Dorado RN  VAP Prevention Bundle:   HOB elevation maintained   oral care regularly provided   readiness to extubate assessed   sedation interruption performed   vent circuit breaks minimized   stress ulcer prophylaxis provided   VTE prophylaxis provided  Oral Care:   oral rinse provided   swabbed with antiseptic solution  Head of Bed (HOB) Positioning: HOB at 30 degrees  VAP Prevention Contraindications: (await decision making capacity before work toward extubation again.)   no/minimum sedation required   other (see comments)  VAP Prevention Measures: completed  Taken 1/5/2025 1000 by Makenna Dorado RN  Head of Bed (HOB) Positioning: HOB at 30 degrees  Taken 1/5/2025 0800 by Makenna Dorado RN  VAP Prevention Bundle:   HOB elevation maintained   oral care regularly provided   readiness to extubate assessed   sedation interruption performed   vent circuit breaks minimized   stress ulcer prophylaxis provided   VTE prophylaxis provided  Oral Care:   oral rinse provided   swabbed with antiseptic solution  Head of Bed (HOB) Positioning: HOB at 30 degrees  VAP Prevention Contraindications: (await decision making capacity before work toward extubation again.)   no/minimum sedation required   other (see comments)  VAP Prevention Measures: completed     Problem: Fall Injury Risk  Goal: Absence of Fall and Fall-Related Injury  Outcome: Not Progressing  Intervention: Identify and Manage Contributors  Recent Flowsheet Documentation  Taken 1/5/2025 1600 by Makenna Dorado RN  Medication Review/Management: medications reviewed  Taken 1/5/2025 1200 by Makenna Dorado  RN  Medication Review/Management: medications reviewed  Taken 1/5/2025 0800 by Makenna Dorado RN  Medication Review/Management: medications reviewed  Intervention: Promote Injury-Free Environment  Recent Flowsheet Documentation  Taken 1/5/2025 1600 by Makenna Dorado RN  Safety Promotion/Fall Prevention:   clutter free environment maintained   increase visualization of patient   lighting adjusted   room near nurse's station  Taken 1/5/2025 1200 by Makenna Dorado RN  Safety Promotion/Fall Prevention:   clutter free environment maintained   increase visualization of patient   lighting adjusted   room near nurse's station  Taken 1/5/2025 0800 by Makenna Dorado RN  Safety Promotion/Fall Prevention:   clutter free environment maintained   increase visualization of patient   lighting adjusted   room near nurse's station     Problem: Restraint, Nonviolent  Goal: Absence of Harm or Injury  Outcome: Not Progressing  Intervention: Protect Dignity, Rights and Personal Wellbeing  Recent Flowsheet Documentation  Taken 1/5/2025 1600 by Makenna Dorado RN  Trust Relationship/Rapport:   care explained   reassurance provided  Taken 1/5/2025 1400 by Makenna Dorado RN  Trust Relationship/Rapport:   care explained   reassurance provided  Taken 1/5/2025 1200 by Makenna Dorado RN  Trust Relationship/Rapport:   care explained   reassurance provided  Taken 1/5/2025 1000 by Makenna Dorado RN  Trust Relationship/Rapport:   care explained   reassurance provided  Taken 1/5/2025 0800 by Makenna Dorado RN  Trust Relationship/Rapport:   care explained   reassurance provided  Intervention: Protect Skin and Joint Integrity  Recent Flowsheet Documentation  Taken 1/5/2025 1600 by Makenna Dorado RN  Body Position:   turned   heels elevated   right  Skin Protection:   adhesive use limited   hydrocolloids used   incontinence pads utilized  Range of Motion: ROM (range of motion) performed  Taken 1/5/2025 1400 by  Makenna Dorado RN  Body Position:   turned   left   heels elevated  Taken 1/5/2025 1200 by Makenna Dorado RN  Body Position:   turned   heels elevated   right  Skin Protection:   adhesive use limited   hydrocolloids used   incontinence pads utilized  Range of Motion: ROM (range of motion) performed  Taken 1/5/2025 1000 by Makenna Dorado RN  Body Position:   turned   left   heels elevated  Taken 1/5/2025 0800 by Makenna Dorado RN  Body Position:   turned   left   heels elevated  Skin Protection:   adhesive use limited   hydrocolloids used   incontinence pads utilized  Range of Motion: ROM (range of motion) performed   Goal Outcome Evaluation:

## 2025-01-05 NOTE — PROGRESS NOTES
[facility] ICU RESPIRATORY NOTE      Date of Admission: 12/25/2024    Date of Intubation (most recent): 01/03/2025    Reason for Mechanical Ventilation: Airway protection    Number of Days on Mechanical Ventilation: 3 since reintubation.    Met Criteria for Spontaneous Breathing Trial: Yes    Bite Block: No    Significant Events Today: PSV 5/5 this shift for 45 minutes. Placed back on A/C due to increased secretions. Bronched this afternoon.     ABG Results:   Recent Labs   Lab 01/03/25  1511 01/01/25  2116 12/30/24  0455   PH  --   --  7.37   PCO2  --   --  36   PO2  --   --  97   HCO3  --   --  21   O2PER 30 30 30         Current Vent Settings: FiO2 (%): 40 %, Resp: 18, Vent Mode: CMV/AC, Resp Rate (Set): 18 breaths/min, Tidal Volume (Set, mL): 530 mL, PEEP (cm H2O): 5 cmH2O, Pressure Support (cm H2O): 5 cmH2O, Resp Rate (Set): 18 breaths/min, Tidal Volume (Set, mL): 530 mL, PEEP (cm H2O): 5 cmH2O    Skin Assessment: Skin integrity is good. No issues.     Plan: Continue full ventilator support. Ethics to meet with Kirsten's family afternoon of 1/6/2025.     Maria Dolores Meza RT on 1/5/2025 at 5:25 PM

## 2025-01-05 NOTE — PLAN OF CARE
"  Problem: Adult Inpatient Plan of Care    1/4/2025 1841 by Makenna Dorado, RN  Outcome: Not Progressing  Flowsheets (Taken 1/4/2025 1841)  Individualized Care Needs: Pt appears to understand simple yes/no ?'s, nods head yes/no, follows commands with squeeze on right hand, closes eyes, opens mouth, withdraws all other extremities.  Family at bedside, communicating as able with letter board, state pt asked for his keyboard to assist in communication strategies.  Pt was unable to use the keyboard as a means of communication despite family attempts in assistance.  Rolls eyes when complex issues are raised, related to CAM questions, or family conversation regarding next steps.  Await ethics and pyschiatry to assess decision making capacity for goals of care conversation.  Family concerned and supportive of making the best decision for the pt, discussing long term care and the outlook for pt recovery.  Continues to have thick, copious oral and ET secretions, requires frequent suctioning.    Met with family at shift change, observed spouse and daughter to have yes/no conversation about removing the ET tube.  Through a series of yes/no questions, they pt indicated that he understands that taking the tube out and replacing isn't something that can continue for several times.  He indicated thumbs up (yes) when asked if he would sign a DNR before removal of the ET tube.  This was asked twice by family.  This writer discussed how further questions will be required to determine decision making capacity.  This writer also performed the cam delirium test with family present.  The patient did not pass the screen, squeezing my hand x 1 in the \"save a heart\" question and asnwering 'Yes\" to does one pound weight more than 2 question.  This exam was explained that it measures delirium, family appears to understand.    1/4/2025 1839 by Makenna Dorado, RN  Outcome: Not Progressing  Goal: Absence of Hospital-Acquired Illness or " Injury  1/4/2025 1841 by Makenna Dorado RN  Outcome: Not Progressing  1/4/2025 1839 by Makenna Dorado RN  Outcome: Not Progressing  Intervention: Identify and Manage Fall Risk  Recent Flowsheet Documentation  Taken 1/4/2025 1600 by Makenna Dorado RN  Safety Promotion/Fall Prevention:   clutter free environment maintained   increase visualization of patient   lighting adjusted   room near nurse's station  Taken 1/4/2025 1200 by Makenna Dorado RN  Safety Promotion/Fall Prevention:   clutter free environment maintained   increase visualization of patient   lighting adjusted   room near nurse's station  Taken 1/4/2025 0830 by Makenna Dorado RN  Safety Promotion/Fall Prevention:   clutter free environment maintained   increase visualization of patient   lighting adjusted   room near nurse's station  Intervention: Prevent Skin Injury  Recent Flowsheet Documentation  Taken 1/4/2025 1600 by Makenna Dorado RN  Body Position:   turned   right   heels elevated  Skin Protection:   adhesive use limited   hydrocolloids used   incontinence pads utilized  Taken 1/4/2025 1200 by Makenna Dorado RN  Body Position:   turned   right   heels elevated  Skin Protection:   adhesive use limited   hydrocolloids used   incontinence pads utilized  Taken 1/4/2025 0830 by Makenna Dorado RN  Body Position:   turned   right   heels elevated  Skin Protection:   adhesive use limited   hydrocolloids used   incontinence pads utilized  Intervention: Prevent and Manage VTE (Venous Thromboembolism) Risk  Recent Flowsheet Documentation  Taken 1/4/2025 1600 by Makenna Dorado RN  VTE Prevention/Management: SCDs on (sequential compression devices)  Taken 1/4/2025 1200 by Makenna Dorado RN  VTE Prevention/Management: SCDs on (sequential compression devices)  Taken 1/4/2025 0830 by Makenna Dorado RN  VTE Prevention/Management: SCDs on (sequential compression devices)  Intervention: Prevent Infection  Recent Flowsheet  Documentation  Taken 1/4/2025 1600 by Makenna Dorado RN  Infection Prevention:   environmental surveillance performed   equipment surfaces disinfected   hand hygiene promoted   rest/sleep promoted   single patient room provided  Taken 1/4/2025 1200 by Makenna Dorado RN  Infection Prevention:   environmental surveillance performed   equipment surfaces disinfected   hand hygiene promoted   rest/sleep promoted   single patient room provided  Taken 1/4/2025 0830 by Makenna Dorado RN  Infection Prevention:   environmental surveillance performed   equipment surfaces disinfected   hand hygiene promoted   rest/sleep promoted   single patient room provided  Goal: Optimal Comfort and Wellbeing  1/4/2025 1841 by Makenna Dorado RN  Outcome: Not Progressing  1/4/2025 1839 by Makenna Dorado RN  Outcome: Not Progressing  Intervention: Provide Person-Centered Care  Recent Flowsheet Documentation  Taken 1/4/2025 1600 by Makenna Dorado RN  Trust Relationship/Rapport:   care explained   reassurance provided  Taken 1/4/2025 1400 by Makenna Dorado RN  Trust Relationship/Rapport:   care explained   reassurance provided  Taken 1/4/2025 1200 by Makenna Dorado RN  Trust Relationship/Rapport:   care explained   reassurance provided  Taken 1/4/2025 1000 by Makenna Dorado RN  Trust Relationship/Rapport:   care explained   reassurance provided  Taken 1/4/2025 0800 by Makenna Dorado RN  Trust Relationship/Rapport:   care explained   reassurance provided  Goal: Readiness for Transition of Care  1/4/2025 1841 by Makenna Dorado RN  Outcome: Not Progressing  1/4/2025 1839 by Makenna Dorado RN  Outcome: Not Progressing  Goal: Plan of Care Review  D  1/4/2025 1841 by Makenna Dorado RN  Outcome: Not Progressing  1/4/2025 1839 by Makenna Dorado RN  Outcome: Not Progressing     Problem: Stroke, Ischemic (Includes Transient Ischemic Attack)  Goal: Optimal Coping  1/4/2025 1841 by Makenna Dorado  RN  Outcome: Not Progressing  1/4/2025 1839 by Makenna Dorado RN  Outcome: Not Progressing  Intervention: Support Psychosocial Response to Stroke  Recent Flowsheet Documentation  Taken 1/4/2025 1600 by Makenna Dorado RN  Supportive Measures: relaxation techniques promoted  Taken 1/4/2025 1400 by Makenna Dorado RN  Supportive Measures: relaxation techniques promoted  Taken 1/4/2025 1200 by Makenna Dorado RN  Supportive Measures: relaxation techniques promoted  Taken 1/4/2025 1000 by Makenna Dorado RN  Supportive Measures: relaxation techniques promoted  Taken 1/4/2025 0800 by Makenna Dorado RN  Supportive Measures: relaxation techniques promoted  Goal: Effective Bowel Elimination  1/4/2025 1841 by Makenna Dorado RN  Outcome: Not Progressing  1/4/2025 1839 by Makenna Dorado RN  Outcome: Not Progressing  Goal: Optimal Cerebral Tissue Perfusion  1/4/2025 1841 by Makenna Dorado RN  Outcome: Not Progressing  1/4/2025 1839 by Makenna Dorado RN  Outcome: Not Progressing  Intervention: Protect and Optimize Cerebral Perfusion  Recent Flowsheet Documentation  Taken 1/4/2025 1600 by Makenna Dorado RN  Sensory Stimulation Regulation:   care clustered   music on  Taken 1/4/2025 1400 by Makenna Dorado RN  Sensory Stimulation Regulation:   care clustered   music on  Taken 1/4/2025 1200 by Makenna Dorado RN  Sensory Stimulation Regulation:   care clustered   music on  Taken 1/4/2025 1000 by Makenna Dorado RN  Sensory Stimulation Regulation:   care clustered   music on  Taken 1/4/2025 0800 by Makenna Dorado RN  Sensory Stimulation Regulation:   care clustered   music on  Goal: Improved Communication Skills  1/4/2025 1841 by Makenna Dorado RN  Outcome: Not Progressing  1/4/2025 1839 by Makenna Doraod RN  Outcome: Not Progressing  Intervention: Optimize Communication Skills  Recent Flowsheet Documentation  Taken 1/4/2025 1600 by Makenna Dorado RN  Communication  Enhancement Strategies:   extra time allowed for response   nonverbal strategies used  Taken 1/4/2025 1400 by Makenna Dorado RN  Communication Enhancement Strategies:   extra time allowed for response   nonverbal strategies used  Taken 1/4/2025 1200 by Makenna Dorado RN  Communication Enhancement Strategies:   extra time allowed for response   nonverbal strategies used  Taken 1/4/2025 1000 by Makenna Dorado RN  Communication Enhancement Strategies:   extra time allowed for response   nonverbal strategies used  Taken 1/4/2025 0800 by Makenna Dorado RN  Communication Enhancement Strategies:   extra time allowed for response   nonverbal strategies used  Goal: Optimal Functional Ability  1/4/2025 1841 by Makenna Dorado RN  Outcome: Not Progressing  1/4/2025 1839 by Makenna Dorado RN  Outcome: Not Progressing  Intervention: Optimize Functional Ability  Recent Flowsheet Documentation  Taken 1/4/2025 1600 by Makenna Dorado RN  Activity Management: bedrest  Taken 1/4/2025 1200 by Makenna Dorado RN  Activity Management: bedrest  Taken 1/4/2025 0830 by Makenna Dorado RN  Activity Management: bedrest  Goal: Optimal Nutrition Intake  1/4/2025 1841 by Makenna Dorado RN  Outcome: Not Progressing  1/4/2025 1839 by Makenna Dorado RN  Outcome: Not Progressing  Goal: Effective Oxygenation and Ventilation  1/4/2025 1841 by Makenna Doardo RN  Outcome: Not Progressing  1/4/2025 1839 by Makenna Dorado RN  Outcome: Not Progressing  Intervention: Optimize Oxygenation and Ventilation  Recent Flowsheet Documentation  Taken 1/4/2025 1600 by Makenna Dorado RN  Head of Bed (HOB) Positioning: HOB at 30 degrees  Taken 1/4/2025 1200 by Makenna Dorado RN  Head of Bed (HOB) Positioning: HOB at 30 degrees  Taken 1/4/2025 0830 by Makenna Dorado RN  Head of Bed (HOB) Positioning: HOB at 30 degrees  Goal: Improved Sensorimotor Function  1/4/2025 1841 by Makenna Dorado RN  Outcome: Not  Progressing  1/4/2025 1839 by Makenna Dorado RN  Outcome: Not Progressing  Intervention: Optimize Range of Motion, Motor Control and Function  Recent Flowsheet Documentation  Taken 1/4/2025 1600 by Makenna Dorado RN  Range of Motion: ROM (range of motion) performed  Positioning/Transfer Devices:   pillows   wedge   applied   in use  Taken 1/4/2025 1200 by Makenna Dorado RN  Range of Motion: ROM (range of motion) performed  Positioning/Transfer Devices:   pillows   wedge   applied   in use  Taken 1/4/2025 0830 by Makenna Dorado RN  Range of Motion: ROM (range of motion) performed  Positioning/Transfer Devices:   pillows   wedge   applied   in use  Intervention: Optimize Sensory and Perceptual Ability  Recent Flowsheet Documentation  Taken 1/4/2025 1600 by Makenna Dorado RN  Pressure Reduction Techniques:   heels elevated off bed   pressure points protected  Pressure Reduction Devices:   heel offloading device utilized   positioning supports utilized  Taken 1/4/2025 1200 by Makenna Dorado RN  Pressure Reduction Techniques:   heels elevated off bed   pressure points protected  Pressure Reduction Devices:   heel offloading device utilized   positioning supports utilized  Taken 1/4/2025 0830 by Makenna Dorado RN  Pressure Reduction Techniques:   heels elevated off bed   pressure points protected  Pressure Reduction Devices:   heel offloading device utilized   positioning supports utilized  Goal: Safe and Effective Swallow  1/4/2025 1841 by Makenna Dorado RN  Outcome: Not Progressing  1/4/2025 1839 by Makenna Dorado RN  Outcome: Not Progressing  Goal: Effective Urinary Elimination  1/4/2025 1841 by Makenna Dorado RN  Outcome: Not Progressing  1/4/2025 1839 by Makenna Dorado RN  Outcome: Not Progressing  Intervention: Promote Effective Bladder Elimination  Recent Flowsheet Documentation  Taken 1/4/2025 1600 by Makenna Dorado RN  Urinary Elimination Promotion: absorbent  pad/diaper use encouraged  Taken 1/4/2025 1200 by Makenna Dorado RN  Urinary Elimination Promotion: absorbent pad/diaper use encouraged  Taken 1/4/2025 0830 by Makenna Dorado RN  Urinary Elimination Promotion: absorbent pad/diaper use encouraged  Goal: Optimal Cognitive Function  1/4/2025 1841 by Makenna Dorado RN  Outcome: Not Progressing  1/4/2025 1839 by Makenna Dorado RN  Outcome: Not Progressing  Intervention: Optimize Cognitive Function  Recent Flowsheet Documentation  Taken 1/4/2025 1600 by Makenna Dorado RN  Sensory Stimulation Regulation:   care clustered   music on  Reorientation Measures:   clock in view   reorientation provided  Environment Familiarity/Consistency: daily routine followed  Taken 1/4/2025 1400 by Makenna Dorado RN  Sensory Stimulation Regulation:   care clustered   music on  Reorientation Measures:   clock in view   reorientation provided  Environment Familiarity/Consistency: daily routine followed  Taken 1/4/2025 1200 by Makenna Dorado RN  Sensory Stimulation Regulation:   care clustered   music on  Reorientation Measures:   clock in view   reorientation provided  Environment Familiarity/Consistency: daily routine followed  Taken 1/4/2025 1000 by Makenna Dorado RN  Sensory Stimulation Regulation:   care clustered   music on  Reorientation Measures:   clock in view   reorientation provided  Environment Familiarity/Consistency: daily routine followed  Taken 1/4/2025 0800 by Makenna Dorado RN  Sensory Stimulation Regulation:   care clustered   music on  Reorientation Measures:   clock in view   reorientation provided  Environment Familiarity/Consistency: daily routine followed     Problem: Mechanical Ventilation Invasive  Goal: Effective Communication  1/4/2025 1841 by Makenna Dorado RN  Outcome: Not Progressing  1/4/2025 1839 by Makenna Dorado RN  Outcome: Not Progressing  Intervention: Ensure Effective Communication  Recent Flowsheet  Documentation  Taken 1/4/2025 1600 by Makenna Dorado RN  Communication Enhancement Strategies:   extra time allowed for response   nonverbal strategies used  Trust Relationship/Rapport:   care explained   reassurance provided  Taken 1/4/2025 1400 by Makenna Dorado RN  Communication Enhancement Strategies:   extra time allowed for response   nonverbal strategies used  Trust Relationship/Rapport:   care explained   reassurance provided  Taken 1/4/2025 1200 by Makenna Dorado RN  Communication Enhancement Strategies:   extra time allowed for response   nonverbal strategies used  Trust Relationship/Rapport:   care explained   reassurance provided  Taken 1/4/2025 1000 by Makenna Dorado RN  Communication Enhancement Strategies:   extra time allowed for response   nonverbal strategies used  Trust Relationship/Rapport:   care explained   reassurance provided  Taken 1/4/2025 0800 by Makenna Dorado RN  Communication Enhancement Strategies:   extra time allowed for response   nonverbal strategies used  Trust Relationship/Rapport:   care explained   reassurance provided  Goal: Optimal Device Function  1/4/2025 1841 by Makenna Dorado RN  Outcome: Not Progressing  1/4/2025 1839 by Makenna Dorado RN  Outcome: Not Progressing  Intervention: Optimize Device Care and Function  Recent Flowsheet Documentation  Taken 1/4/2025 1600 by Makenna Dorado RN  Oral Care:   oral rinse provided   suction provided  Taken 1/4/2025 1200 by Makenna Dorado RN  Oral Care:   oral rinse provided   suction provided  Taken 1/4/2025 0830 by Makenna Dorado RN  Oral Care:   oral rinse provided   suction provided  Goal: Optimal Nutrition Delivery  1/4/2025 1841 by Makenna Dorado RN  Outcome: Not Progressing  1/4/2025 1839 by Makenna Dorado RN  Outcome: Not Progressing  Goal: Absence of Device-Related Skin and Tissue Injury  1/4/2025 1841 by Makenna Dorado RN  Outcome: Not Progressing  1/4/2025 1839 by Ave  Makenna MORIN RN  Outcome: Not Progressing  Intervention: Maintain Skin and Tissue Health  Recent Flowsheet Documentation  Taken 1/4/2025 1600 by Makenna Dorado RN  Device Skin Pressure Protection:   absorbent pad utilized/changed   positioning supports utilized   tubing/devices free from skin contact  Taken 1/4/2025 1200 by Makenna Dorado RN  Device Skin Pressure Protection:   absorbent pad utilized/changed   positioning supports utilized   tubing/devices free from skin contact  Taken 1/4/2025 0830 by Makenna Dorado RN  Device Skin Pressure Protection:   absorbent pad utilized/changed   positioning supports utilized   tubing/devices free from skin contact  Goal: Absence of Ventilator-Induced Lung Injury  1/4/2025 1841 by Makenna Dorado RN  Outcome: Not Progressing  1/4/2025 1839 by Makenna Dorado RN  Outcome: Not Progressing  Intervention: Prevent Ventilator-Associated Pneumonia  Recent Flowsheet Documentation  Taken 1/4/2025 1600 by Makenna Dorado RN  VAP Prevention Bundle:   HOB elevation maintained   oral care regularly provided   vent circuit breaks minimized   VTE prophylaxis provided   stress ulcer prophylaxis provided  Oral Care:   oral rinse provided   suction provided  Head of Bed (HOB) Positioning: HOB at 30 degrees  VAP Prevention Measures: completed  Taken 1/4/2025 1200 by Makenna Dorado RN  VAP Prevention Bundle:   HOB elevation maintained   oral care regularly provided   vent circuit breaks minimized   VTE prophylaxis provided   stress ulcer prophylaxis provided  Oral Care:   oral rinse provided   suction provided  Head of Bed (HOB) Positioning: HOB at 30 degrees  VAP Prevention Measures: completed  Taken 1/4/2025 0830 by Makenna Dorado RN  VAP Prevention Bundle:   HOB elevation maintained   oral care regularly provided   vent circuit breaks minimized   VTE prophylaxis provided   stress ulcer prophylaxis provided  Oral Care:   oral rinse provided   suction provided  Head of  Bed (HOB) Positioning: HOB at 30 degrees  VAP Prevention Measures: completed     Problem: Fall Injury Risk  Goal: Absence of Fall and Fall-Related Injury  1/4/2025 1841 by Makenna Dorado RN  Outcome: Not Progressing  1/4/2025 1839 by Makenna Dorado RN  Outcome: Not Progressing  Intervention: Identify and Manage Contributors  Recent Flowsheet Documentation  Taken 1/4/2025 1600 by Makenna Dorado RN  Medication Review/Management: medications reviewed  Taken 1/4/2025 1200 by Makenna Dorado RN  Medication Review/Management: medications reviewed  Taken 1/4/2025 0830 by Makenna Dorado RN  Medication Review/Management: medications reviewed  Intervention: Promote Injury-Free Environment  Recent Flowsheet Documentation  Taken 1/4/2025 1600 by Makenna Dorado RN  Safety Promotion/Fall Prevention:   clutter free environment maintained   increase visualization of patient   lighting adjusted   room near nurse's station  Taken 1/4/2025 1200 by Makenna Dorado RN  Safety Promotion/Fall Prevention:   clutter free environment maintained   increase visualization of patient   lighting adjusted   room near nurse's station  Taken 1/4/2025 0830 by Makenna Dorado RN  Safety Promotion/Fall Prevention:   clutter free environment maintained   increase visualization of patient   lighting adjusted   room near nurse's station     Problem: Restraint, Nonviolent  Goal: Absence of Harm or Injury  1/4/2025 1841 by Makenna Dorado RN  Outcome: Not Progressing  1/4/2025 1839 by Makenna Dorado RN  Outcome: Not Progressing  Intervention: Implement Least Restrictive Safety Strategies  Recent Flowsheet Documentation  Taken 1/4/2025 1600 by Makenna Dorado RN  Medical Device Protection: IV pole/bag removed from visual field  Taken 1/4/2025 1200 by Makenna Dorado RN  Medical Device Protection: IV pole/bag removed from visual field  Taken 1/4/2025 0830 by Makenna Dorado RN  Medical Device Protection: IV  pole/bag removed from visual field  Intervention: Protect Dignity, Rights and Personal Wellbeing  Recent Flowsheet Documentation  Taken 1/4/2025 1600 by Makenna Dorado RN  Trust Relationship/Rapport:   care explained   reassurance provided  Taken 1/4/2025 1400 by Makenna Dorado RN  Trust Relationship/Rapport:   care explained   reassurance provided  Taken 1/4/2025 1200 by Makenna Dorado RN  Trust Relationship/Rapport:   care explained   reassurance provided  Taken 1/4/2025 1000 by Makenna Dorado RN  Trust Relationship/Rapport:   care explained   reassurance provided  Taken 1/4/2025 0800 by Makenna Dorado RN  Trust Relationship/Rapport:   care explained   reassurance provided  Intervention: Protect Skin and Joint Integrity  Recent Flowsheet Documentation  Taken 1/4/2025 1600 by Makenna Dorado RN  Body Position:   turned   right   heels elevated  Skin Protection:   adhesive use limited   hydrocolloids used   incontinence pads utilized  Range of Motion: ROM (range of motion) performed  Taken 1/4/2025 1200 by Makenna Dorado RN  Body Position:   turned   right   heels elevated  Skin Protection:   adhesive use limited   hydrocolloids used   incontinence pads utilized  Range of Motion: ROM (range of motion) performed  Taken 1/4/2025 0830 by Makenna Dorado RN  Body Position:   turned   right   heels elevated  Skin Protection:   adhesive use limited   hydrocolloids used   incontinence pads utilized  Range of Motion: ROM (range of motion) performed   Goal Outcome Evaluation:

## 2025-01-05 NOTE — PLAN OF CARE
"Goal Outcome Evaluation:      Plan of Care Reviewed With: patient    Overall Patient Progress: no changeOverall Patient Progress: no change      Neuro: q2h neuro checks continued. RASS -1. Opens eyes spontaneously. RUE purposeful, all other extremities withdraw. Shakes head to yes/no questions.  Cardiac: SR/SB. HR 55-70s.  Resp: Remains vented at 40%, peep 5. Large amt of thick/red-streaked secretions from ett inline. Lung sounds coarse.  GI/: External cath with good UOP. Rectal tube in place. TF remains at goal.  Pain: complaint of pain once - tylenol given  Skin: no new concerns  Lines: PIV x2  Gtts: precedex 0.1        *see Epic flowsheets for full nursing assessment findings     Problem: Adult Inpatient Plan of Care  Goal: Patient-Specific Goal (Individualized)  Description: You can add care plan individualizations to a care plan. Examples of Individualization might be:  \"Parent requests to be called daily at 9am for status\", \"I have a hard time hearing out of my right ear\", or \"Do not touch me to wake me up as it startles  me\".  Outcome: Not Progressing  Goal: Absence of Hospital-Acquired Illness or Injury  Outcome: Not Progressing  Intervention: Identify and Manage Fall Risk  Recent Flowsheet Documentation  Taken 1/5/2025 0400 by Rose Mary Gresham, IZABELA  Safety Promotion/Fall Prevention:   clutter free environment maintained   increase visualization of patient   lighting adjusted   room near nurse's station  Taken 1/5/2025 0000 by Rose Mary Gresham RN  Safety Promotion/Fall Prevention:   clutter free environment maintained   increase visualization of patient   lighting adjusted   room near nurse's station  Taken 1/4/2025 2000 by Rose Mary Gresham, RN  Safety Promotion/Fall Prevention:   clutter free environment maintained   increase visualization of patient   lighting adjusted   room near nurse's station  Intervention: Prevent Skin Injury  Recent Flowsheet Documentation  Taken 1/5/2025 0600 by Rose Mary Gresham, IZABELA  Body " Position:   turned   heels elevated   upper extremity elevated  Taken 1/5/2025 0400 by Rose Mary Gresham RN  Body Position:   turned   heels elevated   upper extremity elevated  Skin Protection:   adhesive use limited   hydrocolloids used   incontinence pads utilized  Taken 1/5/2025 0200 by Rose Mary Gresham RN  Body Position:   turned   heels elevated   upper extremity elevated  Taken 1/5/2025 0000 by Rose Mary Gresham RN  Body Position:   turned   heels elevated   upper extremity elevated  Skin Protection:   adhesive use limited   hydrocolloids used   incontinence pads utilized  Taken 1/4/2025 2200 by Rose Mary Gresham RN  Body Position:   turned   heels elevated   upper extremity elevated  Taken 1/4/2025 2000 by Rose Mary Gresham RN  Body Position:   turned   heels elevated   upper extremity elevated  Skin Protection:   adhesive use limited   hydrocolloids used   incontinence pads utilized  Intervention: Prevent and Manage VTE (Venous Thromboembolism) Risk  Recent Flowsheet Documentation  Taken 1/5/2025 0400 by Rose Mary Gresham RN  VTE Prevention/Management: SCDs on (sequential compression devices)  Taken 1/5/2025 0000 by Rose Mary Gresham RN  VTE Prevention/Management: SCDs on (sequential compression devices)  Taken 1/4/2025 2000 by Rose Mary Gresham RN  VTE Prevention/Management: SCDs on (sequential compression devices)  Intervention: Prevent Infection  Recent Flowsheet Documentation  Taken 1/5/2025 0400 by Rose Mary Gresham RN  Infection Prevention:   environmental surveillance performed   equipment surfaces disinfected   hand hygiene promoted   rest/sleep promoted   single patient room provided  Taken 1/5/2025 0000 by Rose Mary Gresham RN  Infection Prevention:   environmental surveillance performed   equipment surfaces disinfected   hand hygiene promoted   rest/sleep promoted   single patient room provided  Taken 1/4/2025 2000 by Rose Mary Gresham RN  Infection Prevention:   environmental surveillance performed   equipment surfaces disinfected   hand  hygiene promoted   rest/sleep promoted   single patient room provided  Goal: Optimal Comfort and Wellbeing  Outcome: Not Progressing  Intervention: Monitor Pain and Promote Comfort  Recent Flowsheet Documentation  Taken 1/4/2025 2000 by Rose Mary Gresham RN  Pain Management Interventions:   medication (see MAR)   repositioned  Intervention: Provide Person-Centered Care  Recent Flowsheet Documentation  Taken 1/5/2025 0400 by Rose Mary Gresham RN  Trust Relationship/Rapport:   care explained   reassurance provided   choices provided  Taken 1/5/2025 0000 by Rose Mary Gresham RN  Trust Relationship/Rapport:   care explained   reassurance provided   choices provided  Taken 1/4/2025 2000 by Rose Mary Gresham RN  Trust Relationship/Rapport:   care explained   reassurance provided   choices provided  Goal: Readiness for Transition of Care  Outcome: Not Progressing  Goal: Plan of Care Review  Description: The Plan of Care Review/Shift note should be completed every shift.  The Outcome Evaluation is a brief statement about your assessment that the patient is improving, declining, or no change.  This information will be displayed automatically on your shift  note.  Outcome: Not Progressing  Flowsheets (Taken 1/5/2025 0628)  Plan of Care Reviewed With: patient  Overall Patient Progress: no change     Problem: Stroke, Ischemic (Includes Transient Ischemic Attack)  Goal: Effective Bowel Elimination  Outcome: Not Progressing  Goal: Optimal Cerebral Tissue Perfusion  Outcome: Not Progressing  Intervention: Protect and Optimize Cerebral Perfusion  Recent Flowsheet Documentation  Taken 1/5/2025 0400 by Rose Mary Gresham RN  Sensory Stimulation Regulation:   care clustered   music on  Taken 1/5/2025 0000 by Rose Mary Gresham RN  Sensory Stimulation Regulation:   care clustered   music on  Taken 1/4/2025 2000 by Rose Mary Gresham RN  Sensory Stimulation Regulation:   care clustered   music on  Goal: Improved Communication Skills  Outcome: Not  Progressing  Intervention: Optimize Communication Skills  Recent Flowsheet Documentation  Taken 1/5/2025 0400 by Rose Mary Gresham RN  Communication Enhancement Strategies:   extra time allowed for response   nonverbal strategies used  Taken 1/5/2025 0000 by Rose Mary Gresham RN  Communication Enhancement Strategies:   extra time allowed for response   nonverbal strategies used  Taken 1/4/2025 2000 by Rose Mary Gresham RN  Communication Enhancement Strategies:   extra time allowed for response   nonverbal strategies used  Goal: Optimal Functional Ability  Outcome: Not Progressing  Intervention: Optimize Functional Ability  Recent Flowsheet Documentation  Taken 1/5/2025 0400 by Rose Mary Gresham RN  Activity Management: bedrest  Taken 1/5/2025 0000 by Rose Mary Gresham RN  Activity Management: bedrest  Taken 1/4/2025 2000 by Rose Mary Greshma RN  Activity Management: bedrest  Goal: Effective Oxygenation and Ventilation  Outcome: Not Progressing  Intervention: Optimize Oxygenation and Ventilation  Recent Flowsheet Documentation  Taken 1/5/2025 0600 by Rose Mary Gresham RN  Head of Bed (HOB) Positioning: HOB at 30 degrees  Taken 1/5/2025 0400 by Rose Mary Gresham RN  Airway/Ventilation Management:   airway patency maintained   calming measures promoted   pulmonary hygiene promoted  Head of Bed (HOB) Positioning: HOB at 30 degrees  Taken 1/5/2025 0200 by Rose Mary Gresham RN  Head of Bed (HOB) Positioning: HOB at 30 degrees  Taken 1/5/2025 0000 by Rose Mary Gresham RN  Airway/Ventilation Management:   airway patency maintained   calming measures promoted   pulmonary hygiene promoted  Head of Bed (HOB) Positioning: HOB at 30 degrees  Taken 1/4/2025 2200 by Rose Mary Gresham RN  Head of Bed (HOB) Positioning: HOB at 30 degrees  Taken 1/4/2025 2000 by Rose Mary Gresham RN  Airway/Ventilation Management:   airway patency maintained   calming measures promoted   pulmonary hygiene promoted  Head of Bed (HOB) Positioning: HOB at 30 degrees  Goal: Improved Sensorimotor  Function  Outcome: Not Progressing  Intervention: Optimize Range of Motion, Motor Control and Function  Recent Flowsheet Documentation  Taken 1/5/2025 0600 by Rose Mary Gresham RN  Positioning/Transfer Devices:   pillows   wedge   in use  Taken 1/5/2025 0400 by Rose Mary Gresham RN  Range of Motion: ROM (range of motion) performed  Positioning/Transfer Devices:   pillows   wedge   in use  Taken 1/5/2025 0200 by Rose Mary Gresham RN  Positioning/Transfer Devices:   pillows   wedge   in use  Taken 1/5/2025 0000 by Rose Mary Gresham RN  Range of Motion: ROM (range of motion) performed  Positioning/Transfer Devices:   pillows   wedge   in use  Taken 1/4/2025 2200 by Rose Mary Gresham RN  Positioning/Transfer Devices:   pillows   wedge   in use  Taken 1/4/2025 2000 by Rose Mary Gresham RN  Range of Motion: ROM (range of motion) performed  Positioning/Transfer Devices:   pillows   wedge   in use  Intervention: Optimize Sensory and Perceptual Ability  Recent Flowsheet Documentation  Taken 1/5/2025 0400 by Rose Mary Gresham RN  Pressure Reduction Techniques:   heels elevated off bed   pressure points protected  Pressure Reduction Devices:   heel offloading device utilized   positioning supports utilized  Taken 1/5/2025 0000 by Rose Mary Gresham RN  Pressure Reduction Techniques:   heels elevated off bed   pressure points protected  Pressure Reduction Devices:   heel offloading device utilized   positioning supports utilized  Taken 1/4/2025 2000 by Rose Mary Gresham RN  Pressure Reduction Techniques:   heels elevated off bed   pressure points protected  Pressure Reduction Devices:   heel offloading device utilized   positioning supports utilized  Goal: Safe and Effective Swallow  Outcome: Not Progressing  Goal: Optimal Cognitive Function  Outcome: Not Progressing  Intervention: Optimize Cognitive Function  Recent Flowsheet Documentation  Taken 1/5/2025 0400 by Rose Mary Gresham RN  Sensory Stimulation Regulation:   care clustered   music on  Reorientation  Measures:   clock in view   reorientation provided  Environment Familiarity/Consistency: daily routine followed  Taken 1/5/2025 0000 by Rose Mary Gresham RN  Sensory Stimulation Regulation:   care clustered   music on  Reorientation Measures:   clock in view   reorientation provided  Environment Familiarity/Consistency: daily routine followed  Taken 1/4/2025 2000 by Rose Mary Gresham RN  Sensory Stimulation Regulation:   care clustered   music on  Reorientation Measures:   clock in view   reorientation provided  Environment Familiarity/Consistency: daily routine followed     Problem: Mechanical Ventilation Invasive  Goal: Effective Communication  Outcome: Not Progressing  Intervention: Ensure Effective Communication  Recent Flowsheet Documentation  Taken 1/5/2025 0400 by Rose Mary Gresahm RN  Communication Enhancement Strategies:   extra time allowed for response   nonverbal strategies used  Trust Relationship/Rapport:   care explained   reassurance provided   choices provided  Taken 1/5/2025 0000 by Rose Mary Gresham RN  Communication Enhancement Strategies:   extra time allowed for response   nonverbal strategies used  Trust Relationship/Rapport:   care explained   reassurance provided   choices provided  Taken 1/4/2025 2000 by Rose Mary Gresham RN  Communication Enhancement Strategies:   extra time allowed for response   nonverbal strategies used  Trust Relationship/Rapport:   care explained   reassurance provided   choices provided  Goal: Optimal Device Function  Outcome: Not Progressing  Intervention: Optimize Device Care and Function  Recent Flowsheet Documentation  Taken 1/5/2025 0600 by Rose Mary Gresham RN  Oral Care:   suction provided   swabbed with antiseptic solution  Taken 1/5/2025 0400 by Rose Mary Gresham RN  Airway/Ventilation Management:   airway patency maintained   calming measures promoted   pulmonary hygiene promoted  Oral Care:   suction provided   swabbed with antiseptic solution  Taken 1/5/2025 0200 by Rose Mary Gresham  RN  Oral Care:   suction provided   swabbed with antiseptic solution  Taken 1/5/2025 0000 by Rose Mary Gresham RN  Airway/Ventilation Management:   airway patency maintained   calming measures promoted   pulmonary hygiene promoted  Oral Care:   suction provided   swabbed with antiseptic solution  Taken 1/4/2025 2200 by Rose Mary Gresham RN  Oral Care:   suction provided   swabbed with antiseptic solution  Taken 1/4/2025 2000 by Rose Mary Gresham RN  Airway/Ventilation Management:   airway patency maintained   calming measures promoted   pulmonary hygiene promoted  Oral Care:   suction provided   swabbed with antiseptic solution  Goal: Optimal Nutrition Delivery  Outcome: Not Progressing  Goal: Absence of Device-Related Skin and Tissue Injury  Outcome: Not Progressing  Intervention: Maintain Skin and Tissue Health  Recent Flowsheet Documentation  Taken 1/5/2025 0400 by Rose Mary Gresham RN  Device Skin Pressure Protection:   absorbent pad utilized/changed   positioning supports utilized   tubing/devices free from skin contact  Taken 1/5/2025 0000 by Rose Mary Gresham RN  Device Skin Pressure Protection:   absorbent pad utilized/changed   positioning supports utilized   tubing/devices free from skin contact  Taken 1/4/2025 2000 by Rose Mary Gresham RN  Device Skin Pressure Protection:   absorbent pad utilized/changed   positioning supports utilized   tubing/devices free from skin contact  Goal: Absence of Ventilator-Induced Lung Injury  Outcome: Not Progressing  Intervention: Facilitate Lung-Protection Measures  Recent Flowsheet Documentation  Taken 1/5/2025 0400 by Rose Mary Gresham RN  Lung Protection Measures:   fluid excess minimized   lung compliance monitored   optimal PEEP applied   ventilator synchrony promoted   ventilator waveforms monitored  Taken 1/5/2025 0000 by Rose Mary Gersham RN  Lung Protection Measures:   fluid excess minimized   lung compliance monitored   optimal PEEP applied   ventilator synchrony promoted   ventilator  waveforms monitored  Taken 1/4/2025 2000 by Rose Mary Gresham RN  Lung Protection Measures:   fluid excess minimized   lung compliance monitored   optimal PEEP applied   ventilator synchrony promoted   ventilator waveforms monitored  Intervention: Prevent Ventilator-Associated Pneumonia  Recent Flowsheet Documentation  Taken 1/5/2025 0600 by Rose Mary Gresham RN  Oral Care:   suction provided   swabbed with antiseptic solution  Head of Bed (HOB) Positioning: HOB at 30 degrees  Taken 1/5/2025 0400 by Rose Mary Gresham RN  VAP Prevention Bundle:   HOB elevation maintained   oral care regularly provided   vent circuit breaks minimized   VTE prophylaxis provided   stress ulcer prophylaxis provided  Oral Care:   suction provided   swabbed with antiseptic solution  Head of Bed (HOB) Positioning: HOB at 30 degrees  VAP Prevention Measures: completed  Taken 1/5/2025 0200 by Rose Mary Gresham RN  Oral Care:   suction provided   swabbed with antiseptic solution  Head of Bed (HOB) Positioning: HOB at 30 degrees  Taken 1/5/2025 0000 by Rose Mary Gresham RN  VAP Prevention Bundle:   HOB elevation maintained   oral care regularly provided   vent circuit breaks minimized   VTE prophylaxis provided   stress ulcer prophylaxis provided  Oral Care:   suction provided   swabbed with antiseptic solution  Head of Bed (HOB) Positioning: HOB at 30 degrees  VAP Prevention Measures: completed  Taken 1/4/2025 2200 by Rose Mary Gresham RN  Oral Care:   suction provided   swabbed with antiseptic solution  Head of Bed (HOB) Positioning: HOB at 30 degrees  Taken 1/4/2025 2000 by Rose Mary Gresham RN  VAP Prevention Bundle:   HOB elevation maintained   oral care regularly provided   vent circuit breaks minimized   VTE prophylaxis provided   stress ulcer prophylaxis provided  Oral Care:   suction provided   swabbed with antiseptic solution  Head of Bed (HOB) Positioning: HOB at 30 degrees  VAP Prevention Measures: completed

## 2025-01-06 LAB
ANION GAP SERPL CALCULATED.3IONS-SCNC: 13 MMOL/L (ref 7–15)
BUN SERPL-MCNC: 47.3 MG/DL (ref 8–23)
CALCIUM SERPL-MCNC: 9.2 MG/DL (ref 8.8–10.4)
CHLORIDE SERPL-SCNC: 121 MMOL/L (ref 98–107)
CREAT SERPL-MCNC: 1.27 MG/DL (ref 0.67–1.17)
EGFRCR SERPLBLD CKD-EPI 2021: 57 ML/MIN/1.73M2
ERYTHROCYTE [DISTWIDTH] IN BLOOD BY AUTOMATED COUNT: 12.7 % (ref 10–15)
GLUCOSE BLDC GLUCOMTR-MCNC: 146 MG/DL (ref 70–99)
GLUCOSE BLDC GLUCOMTR-MCNC: 148 MG/DL (ref 70–99)
GLUCOSE BLDC GLUCOMTR-MCNC: 152 MG/DL (ref 70–99)
GLUCOSE SERPL-MCNC: 160 MG/DL (ref 70–99)
HCO3 SERPL-SCNC: 19 MMOL/L (ref 22–29)
HCT VFR BLD AUTO: 38.6 % (ref 40–53)
HGB BLD-MCNC: 12.7 G/DL (ref 13.3–17.7)
MAGNESIUM SERPL-MCNC: 2.6 MG/DL (ref 1.7–2.3)
MCH RBC QN AUTO: 32 PG (ref 26.5–33)
MCHC RBC AUTO-ENTMCNC: 32.9 G/DL (ref 31.5–36.5)
MCV RBC AUTO: 97 FL (ref 78–100)
PHOSPHATE SERPL-MCNC: 3 MG/DL (ref 2.5–4.5)
PLATELET # BLD AUTO: 201 10E3/UL (ref 150–450)
POTASSIUM SERPL-SCNC: 4.7 MMOL/L (ref 3.4–5.3)
RBC # BLD AUTO: 3.97 10E6/UL (ref 4.4–5.9)
SODIUM SERPL-SCNC: 153 MMOL/L (ref 135–145)
WBC # BLD AUTO: 10.1 10E3/UL (ref 4–11)

## 2025-01-06 PROCEDURE — 250N000011 HC RX IP 250 OP 636: Performed by: INTERNAL MEDICINE

## 2025-01-06 PROCEDURE — 85041 AUTOMATED RBC COUNT: CPT | Performed by: INTERNAL MEDICINE

## 2025-01-06 PROCEDURE — 84100 ASSAY OF PHOSPHORUS: CPT | Performed by: INTERNAL MEDICINE

## 2025-01-06 PROCEDURE — 36415 COLL VENOUS BLD VENIPUNCTURE: CPT | Performed by: INTERNAL MEDICINE

## 2025-01-06 PROCEDURE — 250N000009 HC RX 250: Performed by: INTERNAL MEDICINE

## 2025-01-06 PROCEDURE — 99291 CRITICAL CARE FIRST HOUR: CPT | Mod: FS | Performed by: PHYSICIAN ASSISTANT

## 2025-01-06 PROCEDURE — 94640 AIRWAY INHALATION TREATMENT: CPT | Mod: 76

## 2025-01-06 PROCEDURE — 250N000013 HC RX MED GY IP 250 OP 250 PS 637: Performed by: HOSPITALIST

## 2025-01-06 PROCEDURE — 999N000157 HC STATISTIC RCP TIME EA 10 MIN

## 2025-01-06 PROCEDURE — 250N000013 HC RX MED GY IP 250 OP 250 PS 637: Performed by: PSYCHIATRY & NEUROLOGY

## 2025-01-06 PROCEDURE — 85014 HEMATOCRIT: CPT | Performed by: INTERNAL MEDICINE

## 2025-01-06 PROCEDURE — 250N000013 HC RX MED GY IP 250 OP 250 PS 637: Performed by: SURGERY

## 2025-01-06 PROCEDURE — 80048 BASIC METABOLIC PNL TOTAL CA: CPT | Performed by: INTERNAL MEDICINE

## 2025-01-06 PROCEDURE — 99291 CRITICAL CARE FIRST HOUR: CPT | Performed by: INTERNAL MEDICINE

## 2025-01-06 PROCEDURE — 120N000004 HC R&B MS OVERFLOW

## 2025-01-06 PROCEDURE — 94640 AIRWAY INHALATION TREATMENT: CPT

## 2025-01-06 PROCEDURE — 258N000003 HC RX IP 258 OP 636: Performed by: INTERNAL MEDICINE

## 2025-01-06 PROCEDURE — 999N000009 HC STATISTIC AIRWAY CARE

## 2025-01-06 PROCEDURE — 99221 1ST HOSP IP/OBS SF/LOW 40: CPT | Performed by: PHYSICIAN ASSISTANT

## 2025-01-06 PROCEDURE — 999N000259 HC STATISTIC EXTUBATION

## 2025-01-06 PROCEDURE — 250N000013 HC RX MED GY IP 250 OP 250 PS 637: Performed by: PHYSICIAN ASSISTANT

## 2025-01-06 PROCEDURE — 94003 VENT MGMT INPAT SUBQ DAY: CPT

## 2025-01-06 PROCEDURE — 250N000013 HC RX MED GY IP 250 OP 250 PS 637: Performed by: INTERNAL MEDICINE

## 2025-01-06 PROCEDURE — 83735 ASSAY OF MAGNESIUM: CPT | Performed by: INTERNAL MEDICINE

## 2025-01-06 RX ORDER — GLYCOPYRROLATE 0.2 MG/ML
0.2 INJECTION, SOLUTION INTRAMUSCULAR; INTRAVENOUS ONCE
Status: COMPLETED | OUTPATIENT
Start: 2025-01-06 | End: 2025-01-06

## 2025-01-06 RX ORDER — LIDOCAINE 40 MG/G
CREAM TOPICAL
Status: DISCONTINUED | OUTPATIENT
Start: 2025-01-06 | End: 2025-01-07 | Stop reason: HOSPADM

## 2025-01-06 RX ORDER — GLYCOPYRROLATE 0.2 MG/ML
0.1 INJECTION, SOLUTION INTRAMUSCULAR; INTRAVENOUS EVERY 4 HOURS PRN
Status: DISCONTINUED | OUTPATIENT
Start: 2025-01-06 | End: 2025-01-07 | Stop reason: HOSPADM

## 2025-01-06 RX ORDER — HYDROMORPHONE HYDROCHLORIDE 1 MG/ML
.5-1 INJECTION, SOLUTION INTRAMUSCULAR; INTRAVENOUS; SUBCUTANEOUS ONCE
Status: COMPLETED | OUTPATIENT
Start: 2025-01-06 | End: 2025-01-06

## 2025-01-06 RX ORDER — HYDROMORPHONE HYDROCHLORIDE 1 MG/ML
.5-2 INJECTION, SOLUTION INTRAMUSCULAR; INTRAVENOUS; SUBCUTANEOUS EVERY 5 MIN PRN
Status: DISCONTINUED | OUTPATIENT
Start: 2025-01-06 | End: 2025-01-07

## 2025-01-06 RX ORDER — SODIUM CHLORIDE 9 MG/ML
INJECTION, SOLUTION INTRAVENOUS CONTINUOUS
Status: DISCONTINUED | OUTPATIENT
Start: 2025-01-06 | End: 2025-01-06

## 2025-01-06 RX ADMIN — ALBUTEROL SULFATE 2.5 MG: 2.5 SOLUTION RESPIRATORY (INHALATION) at 12:15

## 2025-01-06 RX ADMIN — GLYCOPYRROLATE 0.2 MG: 0.2 INJECTION, SOLUTION INTRAMUSCULAR; INTRAVENOUS at 16:25

## 2025-01-06 RX ADMIN — CILOSTAZOL 100 MG: 100 TABLET ORAL at 09:35

## 2025-01-06 RX ADMIN — MIDAZOLAM 2 MG: 1 INJECTION INTRAMUSCULAR; INTRAVENOUS at 17:24

## 2025-01-06 RX ADMIN — HYDROMORPHONE HYDROCHLORIDE 2 MG: 1 INJECTION, SOLUTION INTRAMUSCULAR; INTRAVENOUS; SUBCUTANEOUS at 23:21

## 2025-01-06 RX ADMIN — LABETALOL HYDROCHLORIDE 10 MG: 5 INJECTION INTRAVENOUS at 09:30

## 2025-01-06 RX ADMIN — HYDROMORPHONE HYDROCHLORIDE 1 MG: 1 INJECTION, SOLUTION INTRAMUSCULAR; INTRAVENOUS; SUBCUTANEOUS at 21:49

## 2025-01-06 RX ADMIN — CHLORHEXIDINE GLUCONATE 15 ML: 1.2 SOLUTION ORAL at 09:36

## 2025-01-06 RX ADMIN — MIDAZOLAM 2 MG: 1 INJECTION INTRAMUSCULAR; INTRAVENOUS at 21:50

## 2025-01-06 RX ADMIN — METOPROLOL TARTRATE 12.5 MG: 25 TABLET, FILM COATED ORAL at 14:17

## 2025-01-06 RX ADMIN — MIDAZOLAM 2 MG: 1 INJECTION INTRAMUSCULAR; INTRAVENOUS at 23:21

## 2025-01-06 RX ADMIN — Medication 40 MG: at 09:35

## 2025-01-06 RX ADMIN — ALBUTEROL SULFATE 2.5 MG: 2.5 SOLUTION RESPIRATORY (INHALATION) at 02:26

## 2025-01-06 RX ADMIN — LABETALOL HYDROCHLORIDE 10 MG: 5 INJECTION INTRAVENOUS at 14:11

## 2025-01-06 RX ADMIN — ACETYLCYSTEINE 2 ML: 200 SOLUTION ORAL; RESPIRATORY (INHALATION) at 02:27

## 2025-01-06 RX ADMIN — SODIUM CHLORIDE: 9 INJECTION, SOLUTION INTRAVENOUS at 07:00

## 2025-01-06 RX ADMIN — ALBUTEROL SULFATE 2.5 MG: 2.5 SOLUTION RESPIRATORY (INHALATION) at 07:34

## 2025-01-06 RX ADMIN — ACETYLCYSTEINE 2 ML: 200 SOLUTION ORAL; RESPIRATORY (INHALATION) at 12:15

## 2025-01-06 RX ADMIN — ACETYLCYSTEINE 2 ML: 200 SOLUTION ORAL; RESPIRATORY (INHALATION) at 07:34

## 2025-01-06 RX ADMIN — HYDROMORPHONE HYDROCHLORIDE 0.5 MG: 1 INJECTION, SOLUTION INTRAMUSCULAR; INTRAVENOUS; SUBCUTANEOUS at 16:58

## 2025-01-06 RX ADMIN — HYDROMORPHONE HYDROCHLORIDE 2 MG: 1 INJECTION, SOLUTION INTRAMUSCULAR; INTRAVENOUS; SUBCUTANEOUS at 22:55

## 2025-01-06 RX ADMIN — HYDROMORPHONE HYDROCHLORIDE 1 MG: 1 INJECTION, SOLUTION INTRAMUSCULAR; INTRAVENOUS; SUBCUTANEOUS at 17:29

## 2025-01-06 RX ADMIN — HYDROMORPHONE HYDROCHLORIDE 0.5 MG: 1 INJECTION, SOLUTION INTRAMUSCULAR; INTRAVENOUS; SUBCUTANEOUS at 17:16

## 2025-01-06 RX ADMIN — MIDAZOLAM 2 MG: 1 INJECTION INTRAMUSCULAR; INTRAVENOUS at 17:15

## 2025-01-06 RX ADMIN — MIDAZOLAM 2 MG: 1 INJECTION INTRAMUSCULAR; INTRAVENOUS at 20:56

## 2025-01-06 RX ADMIN — MIDAZOLAM 2 MG: 1 INJECTION INTRAMUSCULAR; INTRAVENOUS at 16:57

## 2025-01-06 RX ADMIN — DEXMEDETOMIDINE HYDROCHLORIDE 0.2 MCG/KG/HR: 400 INJECTION INTRAVENOUS at 14:11

## 2025-01-06 RX ADMIN — LOPERAMIDE HYDROCHLORIDE 2 MG: 2 CAPSULE ORAL at 09:35

## 2025-01-06 RX ADMIN — HYDROMORPHONE HYDROCHLORIDE 1 MG: 1 INJECTION, SOLUTION INTRAMUSCULAR; INTRAVENOUS; SUBCUTANEOUS at 17:24

## 2025-01-06 RX ADMIN — TICAGRELOR 90 MG: 90 TABLET ORAL at 09:36

## 2025-01-06 RX ADMIN — HYDROMORPHONE HYDROCHLORIDE 0.5 MG: 1 INJECTION, SOLUTION INTRAMUSCULAR; INTRAVENOUS; SUBCUTANEOUS at 20:56

## 2025-01-06 RX ADMIN — MIDAZOLAM 2 MG: 1 INJECTION INTRAMUSCULAR; INTRAVENOUS at 17:29

## 2025-01-06 ASSESSMENT — ACTIVITIES OF DAILY LIVING (ADL)
ADLS_ACUITY_SCORE: 54

## 2025-01-06 NOTE — PROGRESS NOTES
[facility] ICU RESPIRATORY NOTE        Date of Admission: 12/25/2024     Date of Intubation (most recent): 1/3/2025    Reason for Mechanical Ventilation: Airway protection     Number of Days on Mechanical Ventilation: 4    Met Criteria for Spontaneous Breathing Trial: No    Reason for No Spontaneous Breathing Trial: Per MD      Bite Block: No    Significant Events Today: None     ABG Results:   Recent Labs   Lab 01/03/25  1511 01/01/25  2116   O2PER 30 30         Current Vent Settings: FiO2 (%): 30 %, Resp: 19, Vent Mode: CMV/AC, Resp Rate (Set): 18 breaths/min, Tidal Volume (Set, mL): 530 mL, PEEP (cm H2O): 5 cmH2O, Pressure Support (cm H2O): 5 cmH2O, Resp Rate (Set): 18 breaths/min, Tidal Volume (Set, mL): 530 mL, PEEP (cm H2O): 5 cmH2O    Skin Assessment: Skin intact     Plan: Continue full vent support and wean as tolerated    RT Melania on 1/6/2025 at 1:30 PM

## 2025-01-06 NOTE — PLAN OF CARE
"Patient and family have jointly decided to transition to comfort cares with plans to compassionately extubate this evening.    --------------------------------------------------------------------------------------------------------------------------  Goal Outcome Evaluation:    Problem: Adult Inpatient Plan of Care  Goal: Patient-Specific Goal (Individualized)  Description: You can add care plan individualizations to a care plan. Examples of Individualization might be:  \"Parent requests to be called daily at 9am for status\", \"I have a hard time hearing out of my right ear\", or \"Do not touch me to wake me up as it startles  me\".  Outcome: Unable to Meet  Goal: Absence of Hospital-Acquired Illness or Injury  Outcome: Unable to Meet  Intervention: Identify and Manage Fall Risk  Recent Flowsheet Documentation  Taken 1/6/2025 1200 by Sydnie Barnes RN  Safety Promotion/Fall Prevention:   clutter free environment maintained   increase visualization of patient   lighting adjusted   room near nurse's station   room door open   room organization consistent   safety round/check completed   activity supervised   nonskid shoes/slippers when out of bed   patient and family education   supervised activity   treat underlying cause   treat reversible contributory factors  Taken 1/6/2025 0800 by Sydnie Barnes RN  Safety Promotion/Fall Prevention:   clutter free environment maintained   increase visualization of patient   lighting adjusted   room near nurse's station   room door open   room organization consistent   safety round/check completed   activity supervised   nonskid shoes/slippers when out of bed   patient and family education   supervised activity   treat underlying cause   treat reversible contributory factors  Intervention: Prevent Skin Injury  Recent Flowsheet Documentation  Taken 1/6/2025 1400 by Sydnie Barnes RN  Body Position:   turned   right   heels elevated  Taken 1/6/2025 1200 by Sydnie Barnes, " RN  Body Position:   turned   left   heels elevated  Skin Protection:   adhesive use limited   incontinence pads utilized   pulse oximeter probe site changed   silicone foam dressing in place   skin to device areas padded   skin sealant/moisture barrier applied   transparent dressing maintained   tubing/devices free from skin contact  Taken 1/6/2025 1000 by Sydnie Barnes RN  Body Position:   turned   right   heels elevated  Taken 1/6/2025 0800 by Sydnie Barnes RN  Body Position:   turned   left   heels elevated  Skin Protection:   adhesive use limited   incontinence pads utilized   pulse oximeter probe site changed   silicone foam dressing in place   skin to device areas padded   skin sealant/moisture barrier applied   transparent dressing maintained   tubing/devices free from skin contact  Intervention: Prevent and Manage VTE (Venous Thromboembolism) Risk  Recent Flowsheet Documentation  Taken 1/6/2025 1200 by Sydnie Barnes RN  VTE Prevention/Management: SCDs on (sequential compression devices)  Taken 1/6/2025 0800 by Sydnie Barnes RN  VTE Prevention/Management: SCDs on (sequential compression devices)  Intervention: Prevent Infection  Recent Flowsheet Documentation  Taken 1/6/2025 1200 by Sydnie Barnes RN  Infection Prevention:   environmental surveillance performed   equipment surfaces disinfected   hand hygiene promoted   rest/sleep promoted   single patient room provided   personal protective equipment utilized  Taken 1/6/2025 0800 by Sydnie Barnes RN  Infection Prevention:   environmental surveillance performed   equipment surfaces disinfected   hand hygiene promoted   rest/sleep promoted   single patient room provided   personal protective equipment utilized  Goal: Optimal Comfort and Wellbeing  Outcome: Unable to Meet  Intervention: Monitor Pain and Promote Comfort  Recent Flowsheet Documentation  Taken 1/6/2025 1200 by Sydnie Barnes RN  Pain Management Interventions:  repositioned  Taken 1/6/2025 0800 by Sydnie Barnes RN  Pain Management Interventions: repositioned  Intervention: Provide Person-Centered Care  Recent Flowsheet Documentation  Taken 1/6/2025 1200 by Sydnie Barnes RN  Trust Relationship/Rapport:   care explained   choices provided   reassurance provided   emotional support provided   empathic listening provided   questions answered   questions encouraged   thoughts/feelings acknowledged  Taken 1/6/2025 0800 by Sydnie Barnes RN  Trust Relationship/Rapport:   care explained   choices provided   reassurance provided   emotional support provided   empathic listening provided   questions answered   questions encouraged   thoughts/feelings acknowledged  Goal: Readiness for Transition of Care  Outcome: Unable to Meet  Goal: Plan of Care Review  Description: The Plan of Care Review/Shift note should be completed every shift.  The Outcome Evaluation is a brief statement about your assessment that the patient is improving, declining, or no change.  This information will be displayed automatically on your shift  note.  Outcome: Unable to Meet     Problem: Stroke, Ischemic (Includes Transient Ischemic Attack)  Goal: Optimal Coping  Outcome: Unable to Meet  Intervention: Support Psychosocial Response to Stroke  Recent Flowsheet Documentation  Taken 1/6/2025 1200 by Sydnie Barnes RN  Supportive Measures:   relaxation techniques promoted   active listening utilized   decision-making supported   problem-solving facilitated   positive reinforcement provided  Family/Support System Care:   caregiver stress acknowledged   involvement promoted   presence promoted   self-care encouraged   support provided  Taken 1/6/2025 0800 by Sydnie Barnes RN  Supportive Measures:   relaxation techniques promoted   active listening utilized   decision-making supported   problem-solving facilitated   positive reinforcement provided  Family/Support System Care:   caregiver stress  acknowledged   involvement promoted   presence promoted   self-care encouraged   support provided  Goal: Effective Bowel Elimination  Outcome: Unable to Meet  Intervention: Promote Effective Bowel Elimination  Recent Flowsheet Documentation  Taken 1/6/2025 1200 by Sydnie Barnes RN  Bowel Elimination Management:   hygiene measures promoted   relaxation techniques promoted  Taken 1/6/2025 0800 by Sydnie Barnes RN  Bowel Elimination Management:   hygiene measures promoted   relaxation techniques promoted  Goal: Optimal Cerebral Tissue Perfusion  Outcome: Unable to Meet  Intervention: Protect and Optimize Cerebral Perfusion  Recent Flowsheet Documentation  Taken 1/6/2025 1200 by Sydnie Barnes RN  Sensory Stimulation Regulation:   care clustered   auditory stimulation minimized   quiet environment promoted  Fluid/Electrolyte Management: fluids provided  Taken 1/6/2025 0800 by Sydnie Barnes RN  Sensory Stimulation Regulation:   care clustered   auditory stimulation minimized   quiet environment promoted  Fluid/Electrolyte Management: fluids provided  Goal: Improved Communication Skills  Outcome: Unable to Meet  Intervention: Optimize Communication Skills  Recent Flowsheet Documentation  Taken 1/6/2025 1200 by Sydnie Barnes RN  Communication Enhancement Strategies:   extra time allowed for response   nonverbal strategies used   call light answered in person   communication board used   communication device used   device use encouraged   family involved in communication plan   family/caregiver assisted with communication   one-step directions provided   repetition utilized   verbal and visual cues paired   verbal communication attempts encouraged   written communication utilized  Taken 1/6/2025 0800 by Sydnie Barnes RN  Communication Enhancement Strategies:   extra time allowed for response   nonverbal strategies used   call light answered in person   communication board used   communication  device used   device use encouraged   family involved in communication plan   family/caregiver assisted with communication   one-step directions provided   repetition utilized   verbal and visual cues paired   verbal communication attempts encouraged   written communication utilized  Goal: Optimal Functional Ability  Outcome: Unable to Meet  Intervention: Optimize Functional Ability  Recent Flowsheet Documentation  Taken 1/6/2025 1200 by Sydnie Barnes RN  Activity Management: bedrest  Taken 1/6/2025 0800 by Sydnie Barnes RN  Activity Management: bedrest  Goal: Optimal Nutrition Intake  Outcome: Unable to Meet  Goal: Effective Oxygenation and Ventilation  Outcome: Unable to Meet  Intervention: Optimize Oxygenation and Ventilation  Recent Flowsheet Documentation  Taken 1/6/2025 1400 by Sydnie Barnes RN  Head of Bed (HOB) Positioning: HOB at 30 degrees  Taken 1/6/2025 1200 by Sydnie Barnes RN  Airway/Ventilation Management:   airway patency maintained   calming measures promoted   pulmonary hygiene promoted  Head of Bed (HOB) Positioning: HOB at 30 degrees  Taken 1/6/2025 1000 by Sydnie Barnes RN  Head of Bed (HOB) Positioning: HOB at 30 degrees  Taken 1/6/2025 0800 by Sydnie Barnes RN  Airway/Ventilation Management:   airway patency maintained   calming measures promoted   pulmonary hygiene promoted  Head of Bed (HOB) Positioning: HOB at 30 degrees  Goal: Improved Sensorimotor Function  Outcome: Unable to Meet  Intervention: Optimize Range of Motion, Motor Control and Function  Recent Flowsheet Documentation  Taken 1/6/2025 1400 by Sydnie Barnes RN  Positioning/Transfer Devices:   pillows   wedge   in use  Taken 1/6/2025 1200 by Sydnie Barnes RN  Range of Motion: ROM (range of motion) performed  Positioning/Transfer Devices:   pillows   wedge   in use  Taken 1/6/2025 1000 by Sydnie Barnes RN  Positioning/Transfer Devices:   pillows   wedge   in use  Taken 1/6/2025 0800 by  Sydnie Barnes RN  Range of Motion: ROM (range of motion) performed  Positioning/Transfer Devices:   pillows   wedge   in use  Intervention: Optimize Sensory and Perceptual Ability  Recent Flowsheet Documentation  Taken 1/6/2025 1200 by Sydnie Barnes RN  Pressure Reduction Techniques:   heels elevated off bed   pressure points protected   weight shift assistance provided   positioned off wounds  Pressure Reduction Devices:   heel offloading device utilized   positioning supports utilized   foam padding utilized  Taken 1/6/2025 0800 by Sydnie Barnes RN  Pressure Reduction Techniques:   heels elevated off bed   pressure points protected   weight shift assistance provided   positioned off wounds  Pressure Reduction Devices:   heel offloading device utilized   positioning supports utilized   foam padding utilized  Goal: Safe and Effective Swallow  Outcome: Unable to Meet  Intervention: Promote and Optimize Fluid and Food Intake  Recent Flowsheet Documentation  Taken 1/6/2025 1200 by Sydnie Barnes RN  Aspiration Precautions:   NPO pending swallow screening/evaluation   oral hygiene care promoted   respiratory status monitored   upright posture maintained   tube feeding placement verified  Taken 1/6/2025 0800 by Sydnie aBrnes RN  Aspiration Precautions:   NPO pending swallow screening/evaluation   oral hygiene care promoted   respiratory status monitored   upright posture maintained   tube feeding placement verified  Goal: Effective Urinary Elimination  Outcome: Unable to Meet  Intervention: Promote Effective Bladder Elimination  Recent Flowsheet Documentation  Taken 1/6/2025 1200 by Sydnie Barnes RN  Urinary Elimination Promotion: absorbent pad/diaper use encouraged  Taken 1/6/2025 0800 by Sydnie Barnes RN  Urinary Elimination Promotion: absorbent pad/diaper use encouraged  Goal: Optimal Cognitive Function  Outcome: Unable to Meet  Intervention: Optimize Cognitive Function  Recent  Flowsheet Documentation  Taken 1/6/2025 1200 by Sydnie Barnes RN  Sensory Stimulation Regulation:   care clustered   auditory stimulation minimized   quiet environment promoted  Reorientation Measures:   clock in view   reorientation provided   familiar social contact encouraged  Environment Familiarity/Consistency: daily routine followed  Taken 1/6/2025 0800 by Sydnie Barnes RN  Sensory Stimulation Regulation:   care clustered   auditory stimulation minimized   quiet environment promoted  Reorientation Measures:   clock in view   reorientation provided   familiar social contact encouraged  Environment Familiarity/Consistency: daily routine followed     Problem: Mechanical Ventilation Invasive  Goal: Effective Communication  Outcome: Unable to Meet  Intervention: Ensure Effective Communication  Recent Flowsheet Documentation  Taken 1/6/2025 1200 by Sydnie Barnes RN  Communication Enhancement Strategies:   extra time allowed for response   nonverbal strategies used   call light answered in person   communication board used   communication device used   device use encouraged   family involved in communication plan   family/caregiver assisted with communication   one-step directions provided   repetition utilized   verbal and visual cues paired   verbal communication attempts encouraged   written communication utilized  Family/Support System Care:   caregiver stress acknowledged   involvement promoted   presence promoted   self-care encouraged   support provided  Trust Relationship/Rapport:   care explained   choices provided   reassurance provided   emotional support provided   empathic listening provided   questions answered   questions encouraged   thoughts/feelings acknowledged  Taken 1/6/2025 0800 by Sydnie Barnes RN  Communication Enhancement Strategies:   extra time allowed for response   nonverbal strategies used   call light answered in person   communication board used   communication  device used   device use encouraged   family involved in communication plan   family/caregiver assisted with communication   one-step directions provided   repetition utilized   verbal and visual cues paired   verbal communication attempts encouraged   written communication utilized  Family/Support System Care:   caregiver stress acknowledged   involvement promoted   presence promoted   self-care encouraged   support provided  Trust Relationship/Rapport:   care explained   choices provided   reassurance provided   emotional support provided   empathic listening provided   questions answered   questions encouraged   thoughts/feelings acknowledged  Goal: Optimal Device Function  Outcome: Unable to Meet  Intervention: Optimize Device Care and Function  Recent Flowsheet Documentation  Taken 1/6/2025 1400 by Sydnie Barnes RN  Oral Care:   suction provided   swabbed with antiseptic solution   lip/mouth moisturizer applied  Taken 1/6/2025 1200 by Sydnie Barnes RN  Airway Safety Measures:   all equipment/monitors on and audible   suction at bedside   suction equipment   manual resuscitator/mask/valve at bedside   suction regulator   manual resuscitator/mask/valve in room   oxygen flowmeter  Airway/Ventilation Management:   airway patency maintained   calming measures promoted   pulmonary hygiene promoted  Oral Care:   suction provided   swabbed with antiseptic solution   lip/mouth moisturizer applied  Taken 1/6/2025 1000 by Sydnie Barnes RN  Oral Care:   suction provided   swabbed with antiseptic solution   lip/mouth moisturizer applied  Taken 1/6/2025 0800 by Sydnie Barnes RN  Airway Safety Measures:   all equipment/monitors on and audible   suction at bedside   suction equipment   manual resuscitator/mask/valve at bedside   suction regulator   manual resuscitator/mask/valve in room   oxygen flowmeter  Airway/Ventilation Management:   airway patency maintained   calming measures promoted   pulmonary  hygiene promoted  Oral Care:   suction provided   swabbed with antiseptic solution   lip/mouth moisturizer applied  Goal: Optimal Nutrition Delivery  Outcome: Unable to Meet  Intervention: Optimize Nutrition Delivery  Recent Flowsheet Documentation  Taken 1/6/2025 1200 by Sydnie Barnes RN  Nutrition Support Management: weight trending reviewed  Taken 1/6/2025 0800 by Sydnie Barnes RN  Nutrition Support Management: weight trending reviewed  Goal: Absence of Device-Related Skin and Tissue Injury  Outcome: Unable to Meet  Intervention: Maintain Skin and Tissue Health  Recent Flowsheet Documentation  Taken 1/6/2025 1200 by Sydnie Barnes RN  Device Skin Pressure Protection:   absorbent pad utilized/changed   adhesive use limited   positioning supports utilized   pressure points protected   skin-to-device areas padded   tubing/devices free from skin contact  Taken 1/6/2025 0800 by Sydnie Barnes RN  Device Skin Pressure Protection:   absorbent pad utilized/changed   adhesive use limited   positioning supports utilized   pressure points protected   skin-to-device areas padded   tubing/devices free from skin contact  Goal: Absence of Ventilator-Induced Lung Injury  Outcome: Unable to Meet  Intervention: Facilitate Lung-Protection Measures  Recent Flowsheet Documentation  Taken 1/6/2025 1200 by Sydnie Barnes RN  Lung Protection Measures:   fluid excess minimized   lung compliance monitored   optimal PEEP applied   ventilator synchrony promoted   ventilator waveforms monitored  Taken 1/6/2025 0800 by Sydnie Barnes RN  Lung Protection Measures:   fluid excess minimized   lung compliance monitored   optimal PEEP applied   ventilator synchrony promoted   ventilator waveforms monitored  Intervention: Prevent Ventilator-Associated Pneumonia  Recent Flowsheet Documentation  Taken 1/6/2025 1400 by Sydnie Barnes RN  Oral Care:   suction provided   swabbed with antiseptic solution   lip/mouth  moisturizer applied  Head of Bed (HOB) Positioning: HOB at 30 degrees  Taken 1/6/2025 1200 by Sydnie Barnes RN  VAP Prevention Bundle:   HOB elevation maintained   oral care regularly provided   readiness to extubate assessed   sedation interruption performed   vent circuit breaks minimized   stress ulcer prophylaxis provided   VTE prophylaxis provided   spontaneous breathing trial performed  Oral Care:   suction provided   swabbed with antiseptic solution   lip/mouth moisturizer applied  Head of Bed (HOB) Positioning: HOB at 30 degrees  VAP Prevention Measures: completed  Taken 1/6/2025 1000 by Sydnie Barnes RN  Oral Care:   suction provided   swabbed with antiseptic solution   lip/mouth moisturizer applied  Head of Bed (HOB) Positioning: HOB at 30 degrees  Taken 1/6/2025 0800 by Sydnie Barnes RN  VAP Prevention Bundle:   HOB elevation maintained   oral care regularly provided   readiness to extubate assessed   sedation interruption performed   vent circuit breaks minimized   stress ulcer prophylaxis provided   VTE prophylaxis provided   spontaneous breathing trial performed  Oral Care:   suction provided   swabbed with antiseptic solution   lip/mouth moisturizer applied  Head of Bed (HOB) Positioning: HOB at 30 degrees  VAP Prevention Measures: completed     Problem: Fall Injury Risk  Goal: Absence of Fall and Fall-Related Injury  Outcome: Unable to Meet  Intervention: Identify and Manage Contributors  Recent Flowsheet Documentation  Taken 1/6/2025 1200 by Sydnie Barnes RN  Medication Review/Management:   medications reviewed   high-risk medications identified  Taken 1/6/2025 0800 by Sydnie Barnes RN  Medication Review/Management:   medications reviewed   high-risk medications identified  Intervention: Promote Injury-Free Environment  Recent Flowsheet Documentation  Taken 1/6/2025 1200 by Sydnie Barnes RN  Safety Promotion/Fall Prevention:   clutter free environment maintained    increase visualization of patient   lighting adjusted   room near nurse's station   room door open   room organization consistent   safety round/check completed   activity supervised   nonskid shoes/slippers when out of bed   patient and family education   supervised activity   treat underlying cause   treat reversible contributory factors  Taken 1/6/2025 0800 by Sydnie Barnes RN  Safety Promotion/Fall Prevention:   clutter free environment maintained   increase visualization of patient   lighting adjusted   room near nurse's station   room door open   room organization consistent   safety round/check completed   activity supervised   nonskid shoes/slippers when out of bed   patient and family education   supervised activity   treat underlying cause   treat reversible contributory factors     Problem: Restraint, Nonviolent  Goal: Absence of Harm or Injury  Outcome: Unable to Meet  Intervention: Implement Least Restrictive Safety Strategies  Recent Flowsheet Documentation  Taken 1/6/2025 1200 by Sydnie Barnes RN  Medical Device Protection:   IV pole/bag removed from visual field   torso covered   tubing secured  Taken 1/6/2025 0800 by Sydnie Barnes RN  Medical Device Protection:   IV pole/bag removed from visual field   torso covered   tubing secured  Intervention: Protect Dignity, Rights and Personal Wellbeing  Recent Flowsheet Documentation  Taken 1/6/2025 1200 by Sydnie Barnes RN  Trust Relationship/Rapport:   care explained   choices provided   reassurance provided   emotional support provided   empathic listening provided   questions answered   questions encouraged   thoughts/feelings acknowledged  Taken 1/6/2025 0800 by Sydnie Barnes RN  Trust Relationship/Rapport:   care explained   choices provided   reassurance provided   emotional support provided   empathic listening provided   questions answered   questions encouraged   thoughts/feelings acknowledged  Intervention: Protect Skin  and Joint Integrity  Recent Flowsheet Documentation  Taken 1/6/2025 1400 by Sydnie Barnes RN  Body Position:   turned   right   heels elevated  Taken 1/6/2025 1200 by Sydnie Barnes RN  Body Position:   turned   left   heels elevated  Skin Protection:   adhesive use limited   incontinence pads utilized   pulse oximeter probe site changed   silicone foam dressing in place   skin to device areas padded   skin sealant/moisture barrier applied   transparent dressing maintained   tubing/devices free from skin contact  Range of Motion: ROM (range of motion) performed  Taken 1/6/2025 1000 by Sydnie Barnes RN  Body Position:   turned   right   heels elevated  Taken 1/6/2025 0800 by Sydnie Barnes RN  Body Position:   turned   left   heels elevated  Skin Protection:   adhesive use limited   incontinence pads utilized   pulse oximeter probe site changed   silicone foam dressing in place   skin to device areas padded   skin sealant/moisture barrier applied   transparent dressing maintained   tubing/devices free from skin contact  Range of Motion: ROM (range of motion) performed

## 2025-01-06 NOTE — PROGRESS NOTES
CLINICAL NUTRITION SERVICES - REASSESSMENT NOTE     Malnutrition Status:    % Intake: No decreased intake noted  % Weight Loss: None noted  Subcutaneous Fat Loss: None observed  Muscle Loss: None observed  Fluid Accumulation/Edema: None noted  Malnutrition Diagnosis: Patient does not meet two of the established criteria necessary for diagnosing malnutrition  Malnutrition Present on Admission: No       SUBJECTIVE INFORMATION  Patient not available for interview due to NPO on vent     CURRENT NUTRITION ORDERS  Diet: NPO  Nutrition Support: Patient continues on goal TF regimen as follows ~    Nutrition Support Enteral:  Type of Feeding Tube: NG  Enteral Frequency:  Continuous  Enteral Regimen: Pivot 1.5 at 60 mL/hr  Total Enteral Provisions: 2160 kcal (24 kcal/kg and 97% needs), 135 g protein (1.5 g/kg), 248 g CHO, 11 g fiber, 1080 mL H2O  Free Water Flush: 200 mL every 4 hours     CURRENT INTAKE/TOLERANCE  Patient tolerating above TF without any documented issues      NEW FINDINGS  Weight: 88.5 kg (fairly stable), I/O 2737/3125  Skin/wounds: N/A  GI symptoms: Stool = 425 mL yesterday, 400 mL on 1/4, and 500 mL on 1/3 (Imodium given x 1 today, x 2 on 1/3, and x 1 on 1/2)  Nutrition-relevant labs: Na 153 (H), K/Phos NL, Mg 2.6 (H)  -176   BUN 47.3 (H), Cr 1.27 (H) - CKD stage III   Nutrition-relevant medications: Reviewed    ASSESSED NUTRITION NEEDS:  Dosing Weight 89.4 kg   Estimated Energy Needs: 0198-9585 kcals (25-30 Kcal/Kg)  Justification: maintenance  Estimated Protein Needs: 105-135 grams protein (1.2-1.5 g pro/Kg)  Justification: hypercatabolism with acute illness  Estimated Fluid Needs: 0261-9132 mL (1 mL/Kcal)  Justification: or per MD    MALNUTRITION  % Intake: No decreased intake noted  % Weight Loss: None noted  Subcutaneous Fat Loss: None observed  Muscle Loss: None observed  Fluid Accumulation/Edema: None noted  Malnutrition Diagnosis: Patient does not meet two of the established criteria  necessary for diagnosing malnutrition  Malnutrition Present on Admission: No    EVALUATION OF THE PROGRESS TOWARD GOALS   Previous Goals (1/2):  Goal TF regimen will continue to meet % needs   Evaluation: Met    Previous Nutrition Diagnosis (1/2):  No nutrition diagnosis identified at this time as TF meeting needs   Evaluation: No change    NUTRITION DIAGNOSIS  No nutrition diagnosis at this time as TF meeting needs     INTERVENTIONS  Collaboration by nutrition professional with other providers - Patient discussed today during interdisciplinary bedside rounds    Goals  Goal TF regimen will continue to meet % needs      Monitoring/Evaluation      Progress toward goals will be monitored and evaluated per policy  Lakisha Ruiz, RD, LD, CNSC   Clinical Dietitian - [facility]

## 2025-01-06 NOTE — PLAN OF CARE
"Goal Outcome Evaluation:      Plan of Care Reviewed With: patient    Overall Patient Progress: no changeOverall Patient Progress: no change       Neuro: q2h neuro checks continued. RASS -1. Opens eyes spontaneously. RUE purposeful, all other extremities withdraw. Shakes head to yes/no questions.  Cardiac: SR. HR 60s-70s.  Resp: Remains vented at 40%, peep 5. Large amt of thick/red-streaked secretions from ett inline. Lung sounds coarse.  GI/: External cath with good UOP. Rectal tube in place. TF remains at goal.  Skin: no new concerns  Lines: PIV x2  Gtts: precedex 0.2        *see Epic flowsheets for full nursing assessment findings       Problem: Adult Inpatient Plan of Care  Goal: Patient-Specific Goal (Individualized)  Description: You can add care plan individualizations to a care plan. Examples of Individualization might be:  \"Parent requests to be called daily at 9am for status\", \"I have a hard time hearing out of my right ear\", or \"Do not touch me to wake me up as it startles  me\".  Outcome: Not Progressing  Goal: Absence of Hospital-Acquired Illness or Injury  Outcome: Not Progressing  Intervention: Identify and Manage Fall Risk  Recent Flowsheet Documentation  Taken 1/6/2025 0400 by Rose Mary Gresham, RN  Safety Promotion/Fall Prevention:   clutter free environment maintained   increase visualization of patient   lighting adjusted   room near nurse's station   room door open   room organization consistent   safety round/check completed  Taken 1/6/2025 0000 by Rose Mary Gresham, RN  Safety Promotion/Fall Prevention:   clutter free environment maintained   increase visualization of patient   lighting adjusted   room near nurse's station   room door open   room organization consistent   safety round/check completed  Taken 1/5/2025 2000 by Rose Mary Gresham, RN  Safety Promotion/Fall Prevention:   clutter free environment maintained   increase visualization of patient   lighting adjusted   room near nurse's station   room " door open   room organization consistent   safety round/check completed  Intervention: Prevent Skin Injury  Recent Flowsheet Documentation  Taken 1/6/2025 0600 by Rose Mary Gresham RN  Body Position:   turned   heels elevated   upper extremity elevated  Taken 1/6/2025 0400 by Rose Mary Gresham RN  Skin Protection:   adhesive use limited   incontinence pads utilized   pulse oximeter probe site changed   silicone foam dressing in place   skin to device areas padded   skin sealant/moisture barrier applied   transparent dressing maintained   tubing/devices free from skin contact  Taken 1/6/2025 0200 by Rose Mary Gresham RN  Body Position:   turned   heels elevated   upper extremity elevated  Taken 1/6/2025 0000 by Rose Mary Gresham RN  Body Position:   turned   heels elevated   upper extremity elevated  Skin Protection:   adhesive use limited   incontinence pads utilized   pulse oximeter probe site changed   silicone foam dressing in place   skin to device areas padded   skin sealant/moisture barrier applied   transparent dressing maintained   tubing/devices free from skin contact  Taken 1/5/2025 2200 by Rose Mary Gresham RN  Body Position:   turned   heels elevated   upper extremity elevated  Taken 1/5/2025 2000 by Rose Mary Gresham RN  Body Position:   turned   heels elevated   upper extremity elevated  Skin Protection:   adhesive use limited   incontinence pads utilized   pulse oximeter probe site changed   silicone foam dressing in place   skin to device areas padded   skin sealant/moisture barrier applied   transparent dressing maintained   tubing/devices free from skin contact  Intervention: Prevent and Manage VTE (Venous Thromboembolism) Risk  Recent Flowsheet Documentation  Taken 1/6/2025 0400 by Rose Mary Gresham RN  VTE Prevention/Management: SCDs on (sequential compression devices)  Taken 1/6/2025 0000 by Rose Mary Gresham RN  VTE Prevention/Management: SCDs on (sequential compression devices)  Taken 1/5/2025 2000 by Rose Mary Gresham RN  VTE  Prevention/Management: SCDs on (sequential compression devices)  Intervention: Prevent Infection  Recent Flowsheet Documentation  Taken 1/6/2025 0400 by Rose Mary Gresham RN  Infection Prevention:   environmental surveillance performed   equipment surfaces disinfected   hand hygiene promoted   rest/sleep promoted   single patient room provided   personal protective equipment utilized  Taken 1/6/2025 0000 by Rose Mary Gresham RN  Infection Prevention:   environmental surveillance performed   equipment surfaces disinfected   hand hygiene promoted   rest/sleep promoted   single patient room provided   personal protective equipment utilized  Taken 1/5/2025 2000 by Rsoe Mary Gresham RN  Infection Prevention:   environmental surveillance performed   equipment surfaces disinfected   hand hygiene promoted   rest/sleep promoted   single patient room provided   personal protective equipment utilized  Goal: Optimal Comfort and Wellbeing  Outcome: Not Progressing  Intervention: Provide Person-Centered Care  Recent Flowsheet Documentation  Taken 1/6/2025 0400 by Rose Mary Gresham RN  Trust Relationship/Rapport:   care explained   choices provided   reassurance provided  Taken 1/6/2025 0000 by Rose Mary Gresham RN  Trust Relationship/Rapport:   care explained   choices provided   reassurance provided  Taken 1/5/2025 2000 by Rose Mary Gresham RN  Trust Relationship/Rapport:   care explained   choices provided   reassurance provided  Goal: Readiness for Transition of Care  Outcome: Not Progressing  Goal: Plan of Care Review  Description: The Plan of Care Review/Shift note should be completed every shift.  The Outcome Evaluation is a brief statement about your assessment that the patient is improving, declining, or no change.  This information will be displayed automatically on your shift  note.  Outcome: Not Progressing  Flowsheets (Taken 1/6/2025 0734)  Plan of Care Reviewed With: patient  Overall Patient Progress: no change     Problem: Stroke, Ischemic  (Includes Transient Ischemic Attack)  Goal: Optimal Coping  Outcome: Not Progressing  Intervention: Support Psychosocial Response to Stroke  Recent Flowsheet Documentation  Taken 1/6/2025 0400 by Rose Mary Gresham RN  Supportive Measures: relaxation techniques promoted  Taken 1/6/2025 0000 by Rose Mary Gresham RN  Supportive Measures: relaxation techniques promoted  Taken 1/5/2025 2000 by Rose Mary Gresham RN  Supportive Measures: relaxation techniques promoted  Goal: Effective Bowel Elimination  Outcome: Not Progressing  Goal: Optimal Cerebral Tissue Perfusion  Outcome: Not Progressing  Intervention: Protect and Optimize Cerebral Perfusion  Recent Flowsheet Documentation  Taken 1/6/2025 0400 by Rose Mary Gresham RN  Sensory Stimulation Regulation:   care clustered   music on  Fluid/Electrolyte Management: fluids provided  Taken 1/6/2025 0000 by Rose Mary Gresham RN  Sensory Stimulation Regulation:   care clustered   music on  Fluid/Electrolyte Management: fluids provided  Taken 1/5/2025 2000 by Rose Mary Gresham RN  Sensory Stimulation Regulation:   care clustered   music on  Fluid/Electrolyte Management: fluids provided  Goal: Improved Communication Skills  Outcome: Not Progressing  Intervention: Optimize Communication Skills  Recent Flowsheet Documentation  Taken 1/6/2025 0400 by Rose Mary Gresham RN  Communication Enhancement Strategies:   extra time allowed for response   nonverbal strategies used  Taken 1/6/2025 0000 by Rose Mary Gresham RN  Communication Enhancement Strategies:   extra time allowed for response   nonverbal strategies used  Taken 1/5/2025 2000 by Rose Mary Gresham RN  Communication Enhancement Strategies:   extra time allowed for response   nonverbal strategies used  Goal: Optimal Functional Ability  Outcome: Not Progressing  Intervention: Optimize Functional Ability  Recent Flowsheet Documentation  Taken 1/6/2025 0400 by Rose Mary Gresham RN  Activity Management: bedrest  Taken 1/6/2025 0000 by Rose Mary Gresham RN  Activity  Management: bedrest  Taken 1/5/2025 2000 by Rose Mary Gresham RN  Activity Management: bedrest  Goal: Optimal Nutrition Intake  Outcome: Not Progressing  Goal: Effective Oxygenation and Ventilation  Outcome: Not Progressing  Intervention: Optimize Oxygenation and Ventilation  Recent Flowsheet Documentation  Taken 1/6/2025 0600 by Rose Mary Gresham RN  Head of Bed (HOB) Positioning: HOB at 30 degrees  Taken 1/6/2025 0400 by Rose Mary Gresham RN  Airway/Ventilation Management:   airway patency maintained   calming measures promoted   pulmonary hygiene promoted  Taken 1/6/2025 0200 by Rose Mary Gresham RN  Head of Bed (HOB) Positioning: HOB at 30 degrees  Taken 1/6/2025 0000 by Rose Mary Gresham RN  Airway/Ventilation Management:   airway patency maintained   calming measures promoted   pulmonary hygiene promoted  Head of Bed (HOB) Positioning: HOB at 30 degrees  Taken 1/5/2025 2200 by Roes Mary Gresham RN  Head of Bed (HOB) Positioning: HOB at 30 degrees  Taken 1/5/2025 2000 by Rose Mary Gresham RN  Airway/Ventilation Management:   airway patency maintained   calming measures promoted   pulmonary hygiene promoted  Head of Bed (HOB) Positioning: HOB at 30 degrees  Goal: Improved Sensorimotor Function  Outcome: Not Progressing  Intervention: Optimize Range of Motion, Motor Control and Function  Recent Flowsheet Documentation  Taken 1/6/2025 0600 by Rose Mary Gresham RN  Positioning/Transfer Devices:   pillows   wedge   in use  Taken 1/6/2025 0400 by Rose Mary Gresham RN  Range of Motion: ROM (range of motion) performed  Positioning/Transfer Devices:   pillows   wedge   in use  Taken 1/6/2025 0200 by Rose Mary Gresham RN  Positioning/Transfer Devices:   pillows   wedge   in use  Taken 1/6/2025 0000 by Rose Mary Gresham RN  Range of Motion: ROM (range of motion) performed  Positioning/Transfer Devices:   pillows   wedge   in use  Taken 1/5/2025 2200 by Rose Mary Gresham RN  Positioning/Transfer Devices:   pillows   wedge   in use  Taken 1/5/2025 2000 by Rose Mary Gresham  RN  Range of Motion: ROM (range of motion) performed  Positioning/Transfer Devices:   pillows   wedge   in use  Intervention: Optimize Sensory and Perceptual Ability  Recent Flowsheet Documentation  Taken 1/6/2025 0400 by Rose Mary Gresham RN  Pressure Reduction Techniques:   heels elevated off bed   pressure points protected   weight shift assistance provided  Pressure Reduction Devices:   heel offloading device utilized   positioning supports utilized  Taken 1/6/2025 0000 by Rose Mary Gresham RN  Pressure Reduction Techniques:   heels elevated off bed   pressure points protected   weight shift assistance provided  Pressure Reduction Devices:   heel offloading device utilized   positioning supports utilized  Taken 1/5/2025 2000 by Rose Mary Gresham RN  Pressure Reduction Techniques:   heels elevated off bed   pressure points protected   weight shift assistance provided  Pressure Reduction Devices:   heel offloading device utilized   positioning supports utilized  Goal: Safe and Effective Swallow  Outcome: Not Progressing  Goal: Effective Urinary Elimination  Outcome: Not Progressing  Intervention: Promote Effective Bladder Elimination  Recent Flowsheet Documentation  Taken 1/6/2025 0400 by Rose Mary Gresham RN  Urinary Elimination Promotion: absorbent pad/diaper use encouraged  Taken 1/6/2025 0000 by Rose Mary Gresham RN  Urinary Elimination Promotion: absorbent pad/diaper use encouraged  Taken 1/5/2025 2000 by Rose Mary Gresham RN  Urinary Elimination Promotion: absorbent pad/diaper use encouraged  Goal: Optimal Cognitive Function  Outcome: Not Progressing  Intervention: Optimize Cognitive Function  Recent Flowsheet Documentation  Taken 1/6/2025 0400 by Rose Mary Gresham RN  Sensory Stimulation Regulation:   care clustered   music on  Reorientation Measures:   clock in view   reorientation provided  Environment Familiarity/Consistency: daily routine followed  Taken 1/6/2025 0000 by Rose Mary Gresham RN  Sensory Stimulation Regulation:   care  clustered   music on  Reorientation Measures:   clock in view   reorientation provided  Environment Familiarity/Consistency: daily routine followed  Taken 1/5/2025 2000 by Rose Mary Gresham RN  Sensory Stimulation Regulation:   care clustered   music on  Reorientation Measures:   clock in view   reorientation provided  Environment Familiarity/Consistency: daily routine followed     Problem: Mechanical Ventilation Invasive  Goal: Effective Communication  Outcome: Not Progressing  Intervention: Ensure Effective Communication  Recent Flowsheet Documentation  Taken 1/6/2025 0400 by Rose Mary Gresham RN  Communication Enhancement Strategies:   extra time allowed for response   nonverbal strategies used  Trust Relationship/Rapport:   care explained   choices provided   reassurance provided  Taken 1/6/2025 0000 by Rose Mary Gresham RN  Communication Enhancement Strategies:   extra time allowed for response   nonverbal strategies used  Trust Relationship/Rapport:   care explained   choices provided   reassurance provided  Taken 1/5/2025 2000 by Rose Mary Gresham RN  Communication Enhancement Strategies:   extra time allowed for response   nonverbal strategies used  Trust Relationship/Rapport:   care explained   choices provided   reassurance provided  Goal: Optimal Device Function  Outcome: Not Progressing  Intervention: Optimize Device Care and Function  Recent Flowsheet Documentation  Taken 1/6/2025 0600 by Rose Mary Gresham RN  Oral Care:   suction provided   swabbed with antiseptic solution  Taken 1/6/2025 0400 by Rose Mary Gresham RN  Airway Safety Measures:   all equipment/monitors on and audible   suction at bedside   suction equipment   manual resuscitator/mask/valve at bedside  Airway/Ventilation Management:   airway patency maintained   calming measures promoted   pulmonary hygiene promoted  Taken 1/6/2025 0200 by Rose Mary Gresham RN  Oral Care:   oral rinse provided   swabbed with antiseptic solution  Taken 1/6/2025 0000 by Rose Mary Gresham  RN  Airway Safety Measures:   all equipment/monitors on and audible   suction at bedside   suction equipment   manual resuscitator/mask/valve at bedside  Airway/Ventilation Management:   airway patency maintained   calming measures promoted   pulmonary hygiene promoted  Oral Care:   oral rinse provided   swabbed with antiseptic solution  Taken 1/5/2025 2200 by Rose Mary Gresham RN  Oral Care:   oral rinse provided   swabbed with antiseptic solution  Taken 1/5/2025 2000 by Rose Mary Gresham RN  Airway Safety Measures:   all equipment/monitors on and audible   suction at bedside   suction equipment   manual resuscitator/mask/valve at bedside  Airway/Ventilation Management:   airway patency maintained   calming measures promoted   pulmonary hygiene promoted  Oral Care:   oral rinse provided   swabbed with antiseptic solution  Goal: Optimal Nutrition Delivery  Outcome: Not Progressing  Intervention: Optimize Nutrition Delivery  Recent Flowsheet Documentation  Taken 1/6/2025 0400 by Rose Mary Gresham RN  Nutrition Support Management: weight trending reviewed  Taken 1/6/2025 0000 by Rose Mary Gresham RN  Nutrition Support Management: weight trending reviewed  Taken 1/5/2025 2000 by Rose Mary Gresham RN  Nutrition Support Management: weight trending reviewed  Goal: Absence of Device-Related Skin and Tissue Injury  Outcome: Not Progressing  Intervention: Maintain Skin and Tissue Health  Recent Flowsheet Documentation  Taken 1/6/2025 0400 by Rose Mary Gresham RN  Device Skin Pressure Protection:   absorbent pad utilized/changed   adhesive use limited   positioning supports utilized   pressure points protected   skin-to-device areas padded   tubing/devices free from skin contact  Taken 1/6/2025 0000 by Rose Mary Gresham RN  Device Skin Pressure Protection:   absorbent pad utilized/changed   adhesive use limited   positioning supports utilized   pressure points protected   skin-to-device areas padded   tubing/devices free from skin contact  Taken  1/5/2025 2000 by Rose Mary Gresham RN  Device Skin Pressure Protection:   absorbent pad utilized/changed   adhesive use limited   positioning supports utilized   pressure points protected   skin-to-device areas padded   tubing/devices free from skin contact  Goal: Absence of Ventilator-Induced Lung Injury  Outcome: Not Progressing  Intervention: Facilitate Lung-Protection Measures  Recent Flowsheet Documentation  Taken 1/6/2025 0400 by Rose Mary Gresham RN  Lung Protection Measures:   fluid excess minimized   lung compliance monitored   optimal PEEP applied   ventilator synchrony promoted   ventilator waveforms monitored  Taken 1/6/2025 0000 by Rose Mary Gresham RN  Lung Protection Measures:   fluid excess minimized   lung compliance monitored   optimal PEEP applied   ventilator synchrony promoted   ventilator waveforms monitored  Taken 1/5/2025 2000 by Rose Mary Gresham RN  Lung Protection Measures:   fluid excess minimized   lung compliance monitored   optimal PEEP applied   ventilator synchrony promoted   ventilator waveforms monitored  Intervention: Prevent Ventilator-Associated Pneumonia  Recent Flowsheet Documentation  Taken 1/6/2025 0600 by Rose Mary Gresham RN  Oral Care:   suction provided   swabbed with antiseptic solution  Head of Bed (HOB) Positioning: HOB at 30 degrees  Taken 1/6/2025 0400 by Rose Mary Gresham RN  VAP Prevention Bundle:   HOB elevation maintained   oral care regularly provided   readiness to extubate assessed   sedation interruption performed   vent circuit breaks minimized   stress ulcer prophylaxis provided   VTE prophylaxis provided  VAP Prevention Contraindications:   no/minimum sedation required   other (see comments)  VAP Prevention Measures: completed  Taken 1/6/2025 0200 by Rose Mary Gresham RN  Oral Care:   oral rinse provided   swabbed with antiseptic solution  Head of Bed (HOB) Positioning: HOB at 30 degrees  Taken 1/6/2025 0000 by Rose Mary Gresham RN  VAP Prevention Bundle:   HOB elevation maintained    oral care regularly provided   readiness to extubate assessed   sedation interruption performed   vent circuit breaks minimized   stress ulcer prophylaxis provided   VTE prophylaxis provided  Oral Care:   oral rinse provided   swabbed with antiseptic solution  Head of Bed (HOB) Positioning: HOB at 30 degrees  VAP Prevention Contraindications:   no/minimum sedation required   other (see comments)  VAP Prevention Measures: completed  Taken 1/5/2025 2200 by Rose Mary Gresham RN  Oral Care:   oral rinse provided   swabbed with antiseptic solution  Head of Bed (HOB) Positioning: HOB at 30 degrees  Taken 1/5/2025 2000 by Rose Mary Gresham RN  VAP Prevention Bundle:   HOB elevation maintained   oral care regularly provided   readiness to extubate assessed   sedation interruption performed   vent circuit breaks minimized   stress ulcer prophylaxis provided   VTE prophylaxis provided  Oral Care:   oral rinse provided   swabbed with antiseptic solution  Head of Bed (HOB) Positioning: HOB at 30 degrees  VAP Prevention Contraindications:   no/minimum sedation required   other (see comments)  VAP Prevention Measures: completed     Problem: Fall Injury Risk  Goal: Absence of Fall and Fall-Related Injury  Outcome: Not Progressing  Intervention: Identify and Manage Contributors  Recent Flowsheet Documentation  Taken 1/6/2025 0400 by Rose Mary Gresham RN  Medication Review/Management: medications reviewed  Taken 1/6/2025 0000 by Rose Mary Gresham RN  Medication Review/Management: medications reviewed  Taken 1/5/2025 2000 by Rose Mary Gresham RN  Medication Review/Management: medications reviewed  Intervention: Promote Injury-Free Environment  Recent Flowsheet Documentation  Taken 1/6/2025 0400 by Rose Mary Gresham RN  Safety Promotion/Fall Prevention:   clutter free environment maintained   increase visualization of patient   lighting adjusted   room near nurse's station   room door open   room organization consistent   safety round/check completed  Taken  1/6/2025 0000 by Rose Mary Gresham RN  Safety Promotion/Fall Prevention:   clutter free environment maintained   increase visualization of patient   lighting adjusted   room near nurse's station   room door open   room organization consistent   safety round/check completed  Taken 1/5/2025 2000 by Rose Mary Gresham RN  Safety Promotion/Fall Prevention:   clutter free environment maintained   increase visualization of patient   lighting adjusted   room near nurse's station   room door open   room organization consistent   safety round/check completed     Problem: Restraint, Nonviolent  Goal: Absence of Harm or Injury  Outcome: Not Progressing  Intervention: Implement Least Restrictive Safety Strategies  Recent Flowsheet Documentation  Taken 1/6/2025 0400 by Rose Mary Gresham RN  Medical Device Protection:   IV pole/bag removed from visual field   torso covered   tubing secured  Taken 1/6/2025 0000 by Rose Mary Gresham RN  Medical Device Protection:   IV pole/bag removed from visual field   torso covered   tubing secured  Taken 1/5/2025 2000 by Rose Mary Gresham RN  Medical Device Protection:   IV pole/bag removed from visual field   torso covered   tubing secured  Intervention: Protect Dignity, Rights and Personal Wellbeing  Recent Flowsheet Documentation  Taken 1/6/2025 0400 by Rose Mary Gresham RN  Trust Relationship/Rapport:   care explained   choices provided   reassurance provided  Taken 1/6/2025 0000 by Rose Mary Gresham RN  Trust Relationship/Rapport:   care explained   choices provided   reassurance provided  Taken 1/5/2025 2000 by Rose Mary Gresham RN  Trust Relationship/Rapport:   care explained   choices provided   reassurance provided  Intervention: Protect Skin and Joint Integrity  Recent Flowsheet Documentation  Taken 1/6/2025 0600 by Rose Mary Gresham RN  Body Position:   turned   heels elevated   upper extremity elevated  Taken 1/6/2025 0400 by Rose Mary Gresham RN  Skin Protection:   adhesive use limited   incontinence pads utilized   pulse  oximeter probe site changed   silicone foam dressing in place   skin to device areas padded   skin sealant/moisture barrier applied   transparent dressing maintained   tubing/devices free from skin contact  Range of Motion: ROM (range of motion) performed  Taken 1/6/2025 0200 by Rose Mary Gresham RN  Body Position:   turned   heels elevated   upper extremity elevated  Taken 1/6/2025 0000 by Rose Mary Gresham RN  Body Position:   turned   heels elevated   upper extremity elevated  Skin Protection:   adhesive use limited   incontinence pads utilized   pulse oximeter probe site changed   silicone foam dressing in place   skin to device areas padded   skin sealant/moisture barrier applied   transparent dressing maintained   tubing/devices free from skin contact  Range of Motion: ROM (range of motion) performed  Taken 1/5/2025 2200 by Rose Mary Gresham RN  Body Position:   turned   heels elevated   upper extremity elevated  Taken 1/5/2025 2000 by Rose Mary Gresham RN  Body Position:   turned   heels elevated   upper extremity elevated  Skin Protection:   adhesive use limited   incontinence pads utilized   pulse oximeter probe site changed   silicone foam dressing in place   skin to device areas padded   skin sealant/moisture barrier applied   transparent dressing maintained   tubing/devices free from skin contact  Range of Motion: ROM (range of motion) performed

## 2025-01-06 NOTE — PROGRESS NOTES
Pt was extubated to RA per MD order.  Nasal trumpet was placed in R nare and pt was suctioned once.   Will continue to follow  Alexander Thurman, RT  1/6/2025

## 2025-01-06 NOTE — PROGRESS NOTES
Comprehensive Daily ICU Note        Elio Aguilar MRN# [id number]   Age: 79 year old YOB: 1945     Date of Admission: 12/25/2024    Primary care provider: No Ref-Primary, Physician     CODE STATUS: full      Problem List:       Active Problems:    Basilar artery stenosis    Slurred speech         Top 24 hour Goals:      Define goals of care  Pressure support. Consider trial of extubation        Subjective/ Last 24 hours:   Events: 79 year old man admitted to the ICU for a posterior CVA for which he underwent balloon angioplasty. The patient was admitted to the ICU for airway protection, ventilatory management following the procedure. He is expected to have significant deficits and will likely need trach and PEG for the rest of his life and unlikely to walk again.  Work is ongoing to determine what patient's wishes would be for his care plan and a tracheostomy and PEG see if he were in his usual mental state.       Physical Examination:       Temp:  [98.4  F (36.9  C)-99.2  F (37.3  C)] 99.2  F (37.3  C)  Pulse:  [64-96] 70  Resp:  [14-33] 19  BP: (126-171)/() 171/94  FiO2 (%):  [30 %-40 %] 30 %  SpO2:  [85 %-99 %] 98 %  General: Stated age, no acute distress  HEENT: Endotracheal tube eyes open  Lungs: Comfortable respiratory pattern on 10/5  Neurology/Psychiatry: Makes eye contact but does not respond to questions  Exam of Line sites: No lines             Medications:     Current Facility-Administered Medications   Medication Dose Route Frequency Provider Last Rate Last Admin    acetylcysteine (MUCOMYST) 20 % nebulizer solution 2 mL  2 mL Nebulization Q6H Cheryl Faulkner MD   2 mL at 01/06/25 0734    albuterol (PROVENTIL) neb solution 2.5 mg  2.5 mg Nebulization Q6H Cheryl Faulkner MD   2.5 mg at 01/06/25 0734    atorvastatin (LIPITOR) tablet 80 mg  80 mg Oral or Feeding Tube QPM Dave Diop MD   80 mg at 01/05/25 1940    chlorhexidine (PERIDEX) 0.12 % solution 15 mL  15 mL Mouth/Throat  Q12H Regino Howard MD   15 mL at 01/06/25 0936    cilostazol (PLETAL) tablet 100 mg  100 mg Oral or NG Tube BID Kandis Betancourt PA-C   100 mg at 01/06/25 0935    pantoprazole (PROTONIX) 2 mg/mL suspension 40 mg  40 [facility]o [facility]   40 mg at 01/06/25 0935    Or    pantoprazole (PROTONIX) IV push injection 40 mg  40 [facility]venous [facility] MD   40 mg at 01/03/25 0818    scopolamine (TRANSDERM) 72 hr patch 1 patch  1 patch Transdermal Q72H Cheryl Faulkner MD   1 patch at 01/04/25 1351    And    scopolamine (TRANSDERM-SCOP) Patch in Place   Transdermal Q8H Cheryl Faulkner MD        sodium chloride (PF) 0.9% PF flush 3 mL  3 mL Intracatheter Q8H Dave Diop MD   3 mL at 01/06/25 0936    ticagrelor (BRILINTA) tablet 90 mg  90 mg Oral or NG Tube BID Lars Pappas MD   90 mg at 01/06/25 0936        Current Facility-Administered Medications   Medication Dose Route Frequency Provider Last Rate Last Admin    dexmedeTOMIDine (PRECEDEX) 4 mcg/mL in sodium chloride 0.9 % 100 mL infusion  0.1-1.2 mcg/kg/hr Intravenous Continuous Zana Shay MD 4.6 mL/hr at 01/06/25 0800 0.2 mcg/kg/hr at 01/06/25 0800    Patient is already receiving anticoagulation with heparin, enoxaparin (LOVENOX), warfarin (COUMADIN)  or other anticoagulant medication   Does not apply Continuous PRN Regino Howard MD        propofol (DIPRIVAN) infusion  5-75 mcg/kg/min Intravenous Continuous Kimberlyn Orozco MD   Stopped at 12/31/24 1128    sodium chloride 0.9 % infusion   Intravenous Continuous Max Real MD 10 mL/hr at 01/06/25 0700 New Bag at 01/06/25 0700       Current Facility-Administered Medications   Medication Dose Route Frequency Provider Last Rate Last Admin    acetaminophen (TYLENOL) tablet 650 mg  650 mg Oral Q4H PRN Donna Mcgill APRN CNP   650 mg at 01/04/25 2000    Or    acetaminophen (TYLENOL) Suppository 650 mg  650 mg Rectal Q4H PRN Nano,  FLORY Thompson CNP        albuterol (PROVENTIL) neb solution 2.5 mg  2.5 mg Nebulization Q2H PRN Cheryl Faulkner MD        calcium carbonate (TUMS) chewable tablet 1,000 mg  1,000 mg Oral 4x Daily PRN Dave Diop MD        glucose gel 15-30 g  15-30 g Oral Q15 Min PRN Regino Howard MD        Or    dextrose 50 % injection 25-50 mL  25-50 mL Intravenous Q15 Min PRN Regino Howard MD   25 mL at 12/30/24 0755    Or    glucagon injection 1 mg  1 mg Subcutaneous Q15 Min PRN Regino Howard MD        labetalol (NORMODYNE/TRANDATE) injection 10 mg  10 mg Intravenous Q4H PRN Zana Shay MD   10 mg at 01/06/25 0930    lidocaine (LMX4) cream   Topical Q1H PRN Dave Diop MD        lidocaine 1 % 0.1-1 mL  0.1-1 mL Other Q1H PRN Dave Diop MD        loperamide (IMODIUM) capsule 2 mg  2 mg Oral BID PRN Katie Bay DO   2 mg at 01/06/25 0935    ondansetron (ZOFRAN ODT) ODT tab 4 mg  4 mg Oral Q6H PRN Dave Diop MD        Or    ondansetron (ZOFRAN) injection 4 mg  4 mg Intravenous Q6H PRN Dave Dipo MD        Patient is already receiving anticoagulation with heparin, enoxaparin (LOVENOX), warfarin (COUMADIN)  or other anticoagulant medication   Does not apply Continuous PRN Regino Howard MD        sodium chloride (PF) 0.9% PF flush 3 mL  3 mL Intracatheter q1 min prn Dave Diop MD             Imaging and Other Data:       Recent Results (from the past 24 hours)   XR Chest Port 1 View    Narrative    EXAM: XR CHEST PORT 1 VIEW  LOCATION: [facility]  DATE: 1/5/2025    INDICATION: Mucous plug, follow-up exam  COMPARISON: 1/3/2025      Impression    IMPRESSION: ETT is 2.2 cm from the sonali. Enteric tube enters the stomach and out of the field-of-view. Persistent but slightly improved patchy left basilar opacities. No pneumothorax or significant pleural effusions. Heart size is normal.               Assessment and Plan:      Summary:  Elio Aguilar is a 79 year old male admitted on 12/25/2024 for stroke.  I have personally reviewed the daily labs, imaging studies, cultures and discussed the case with referring physician and consulting physicians. My assessment and plan as follows:        Neurology and Psychiatry Assessment:   Current Facility-Administered Medications   Medication Dose Route Frequency Provider Last Rate Last Admin    dexmedeTOMIDine (PRECEDEX) 4 mcg/mL in sodium chloride 0.9 % 100 mL infusion  0.1-1.2 mcg/kg/hr Intravenous Continuous Zana Shay MD 4.6 mL/hr at 01/06/25 0800 0.2 mcg/kg/hr at 01/06/25 0800    Patient is already receiving anticoagulation with heparin, enoxaparin (LOVENOX), warfarin (COUMADIN)  or other anticoagulant medication   Does not apply Continuous PRN Regino Howard MD        propofol (DIPRIVAN) infusion  5-75 mcg/kg/min Intravenous Continuous Kimberlyn Orozco MD   Stopped at 12/31/24 1128    sodium chloride 0.9 % infusion   Intravenous Continuous Max Real MD 10 mL/hr at 01/06/25 0700 New Bag at 01/06/25 0700       Basilar artery stenosis causing ischemic strokes.  He has had angioplasty.  Has severe bulbar weakness with dysphagia and inability to manage secretions secondary to above.  Would require tracheostomy and PEG.  Neurologist also quoting high risk of recurrent stroke within the next year.    Plan   Goal normotension.  Continuing dual antiplatelet therapy continuing atorvastatin.  Will discuss plan of care with family later today   Cardiovascular and Hemodynamics Assessment:  Temp:  [98.4  F (36.9  C)-99.2  F (37.3  C)] 99.2  F (37.3  C)  Pulse:  [64-96] 70  Resp:  [14-33] 19  BP: (126-171)/() 171/94  FiO2 (%):  [30 %-40 %] 30 %  SpO2:  [85 %-99 %] 98 %    Intake/Output Summary (Last 24 hours) at 1/6/2025 1038  Last data filed at 1/6/2025 1000  Gross per 24 hour   Intake 2368.09 ml   Output 3050 ml   Net -681.91 ml     BP Readings from Last 6 Encounters:    01/06/25 (!) 171/94     History of hypertension.  Blood pressures here in the normal range to high.  Plan:  Add low-dose metoprolol.  Continue PRNs   Pulmonary: Assessment  FiO2 (%): (S) 30 %, Resp: 19, Vent Mode: CMV/AC, Resp Rate (Set): 18 breaths/min, Tidal Volume (Set, mL): 530 mL, PEEP (cm H2O): 5 cmH2O, Pressure Support (cm H2O): 5 cmH2O, Resp Rate (Set): 18 breaths/min, Tidal Volume (Set, mL): 530 mL, PEEP (cm H2O): 5 cmH2O  Arterial Blood Gas  Recent Labs   Lab 01/03/25  1511 01/01/25  2116   O2PER 30 30     XR Chest Port 1 View  Narrative: EXAM: XR CHEST PORT 1 VIEW  LOCATION: [facility]  DATE: 1/5/2025    INDICATION: Mucous plug, follow-up exam  COMPARISON: 1/3/2025  Impression: IMPRESSION: ETT is 2.2 cm from the sonali. Enteric tube enters the stomach and out of the field-of-view. Persistent but slightly improved patchy left basilar opacities. No pneumothorax or significant pleural effusions. Heart size is normal.      Acute hypoxic respiratory failure secondary to loss of protective airway reflexes.  Able to tolerate pressure support from pulmonary and respiratory muscles status but unable to manage secretions.  Will require tracheostomy.  Difficult to assess patient's current decisional capacity but prolonged mechanical ventilation would not seem to be consistent with his goals of care and values prior to this stroke diarrhea.  No pathogen found.  Plan  Will discuss prognosis for prolonged mechanical ventilation with family members.  Vap precautions: HOB 30, chlorhexidine   GI and Nutrition Assessment:  AST   Date Value Ref Range Status   01/02/2025 23 0 - 45 U/L Final     Comment:     Specimen is hemolyzed which can falsely elevate AST. Analysis of a non-hemolyzed specimen may result in a lower value.   12/30/2024 75 (H) 0 - 45 U/L Final   12/26/2024 23 0 - 45 U/L Final     ALT   Date Value Ref Range Status   01/02/2025 35 0 - 70 U/L Final   12/30/2024 104 (H) 0 - 70 U/L  Final   12/26/2024 22 0 - 70 U/L Final     Bilirubin Total   Date Value Ref Range Status   01/02/2025 0.7 <=1.2 mg/dL Final   12/30/2024 0.6 <=1.2 mg/dL Final   12/26/2024 0.5 <=1.2 mg/dL Final     Protein Total   Date Value Ref Range Status   01/02/2025 6.1 (L) 6.4 - 8.3 g/dL Final   12/30/2024 6.3 (L) 6.4 - 8.3 g/dL Final   12/26/2024 6.4 6.4 - 8.3 g/dL Final     Albumin   Date Value Ref Range Status   01/02/2025 3.1 (L) 3.5 - 5.2 g/dL Final   12/30/2024 3.9 3.5 - 5.2 g/dL Final   12/26/2024 4.0 3.5 - 5.2 g/dL Final     Alkaline Phosphatase   Date Value Ref Range Status   01/02/2025 65 40 - 150 U/L Final   12/30/2024 68 40 - 150 U/L Final   12/26/2024 72 40 - 150 U/L Final     Diarrhea.  No specific pathogen has been identified  Nutritional Plan: Orders Placed This Encounter      Diet      NPO for Medical/Clinical Reasons Except for: Meds        Plan:   Rectal tube for now  Stress ulcer: PPI   Renal, Fluid and Electrolytes: Assessment:  BMP  Recent Labs   Lab 01/06/25  0514 01/05/25  0540 01/04/25  1511 01/04/25  0439 01/03/25  0500   * 154* 151*  --  145   POTASSIUM 4.7 4.0 3.8 3.7 3.6  3.5   CHLORIDE 121* 121* 118*  --  113*   CO2 19* 23 23  --  19*   ANIONGAP 13 10 10  --  13   BUN 47.3* 52.9* 51.6*  --  35.7*   CR 1.27* 1.47* 1.52*  --  1.34*   GFRESTIMATED 57* 48* 46*  --  53*   SPENSER 9.2 9.1 8.8  --  8.7*   MAG 2.6* 2.7*  --  2.5* 2.5*   PHOS 3.0 3.0  --  2.6 3.2       Intake/Output Summary (Last 24 hours) at 1/6/2025 1038  Last data filed at 1/6/2025 1000  Gross per 24 hour   Intake 2368.09 ml   Output 3050 ml   Net -681.91 ml     Net IO Since Admission: 2,208.33 mL [01/06/25 1038]    Acute renal failure that is multifactorial in setting of chronic kidney disease stage III.  This is improving.  Hypernatremia.  On quite a bit of free water but sodium still high    Plan:   Increase free water today  monitor I/O, urine output, kidney function and electrolytes and treat as needed. Avoid nephrotoxic agents    Infectious Disease Assessment:  Temp (24hrs), Av.7  F (37.1  C), Min:98.4  F (36.9  C), Max:99.2  F (37.3  C)    7-Day Micro Results       Collected Updated Procedure Result Status      2025 1135 2025 1212 Respiratory Aerobic Bacterial Culture with Gram Stain [[id number]]   Sputum from Endotracheal    Final result Component Value   Culture 1+ Normal Tom   Gram Stain Result >25 PMNs/low power field    2+ Mixed tom               2024 1900 2025 1253 Respiratory Aerobic Bacterial Culture with Gram Stain [[id number]]   Sputum from Trachea    Final result Component Value   Culture 3+ Normal tom   Gram Stain Result <25 PMNs/low power field    4+ Mixed tom               2024 1835 2025 0118 MRSA MSSA PCR, Nasal Swab [[id number]]    Swab from Nares, Bilateral    Final result Component Value   MRSA Target DNA Negative   SA Target DNA Negative            2024 2253 2025 0426 Urine Culture [[id number]]   Urine, Whitehead Catheter    Final result Component Value   Culture No Growth               2024 2215 2025 1509 Respiratory Aerobic Bacterial Culture with Gram Stain [[id number]]   Sputum from Endotracheal    Final result Component Value   Culture 4+ Normal tom   Gram Stain Result >25 PMNs/low power field    4+ Mixed tom               2024 2104 2025 2316 Blood Culture Hand, Left [[id number]]   Blood from Hand, Left    Final result Component Value   Culture No Growth               2024 2055 2025 2316 Blood Culture Hand, Right [[id number]]   Blood from Hand, Right    Final result Component Value   Culture No Growth                     Possible vap.  Since clinical findings but sputum culture no growth.  Completing an empiric course of antibiotics    Plan:  As above   Hematology and Oncology Assessment:  Recent Labs   Lab 25  0514 25  0540 25  1511 25  1511   WBC 10.1 11.4* 10.1 11.0   RBC 3.97*  3.93* 3.74* 4.06*   HGB 12.7* 12.7* 12.1* 13.2*   HCT 38.6* 38.2* 35.4* 37.6*   MCV 97 97 95 93   MCH 32.0 32.3 32.4 32.5   MCHC 32.9 33.2 34.2 35.1   RDW 12.7 13.1 13.0 12.9    210 196 205     No lab results found in last 7 days.  1. No acute issues  Plan:  Dual antiplatelet  Anticoagulation: mechanical   Endocrinology Assessment:  Stress hyperglycemia but not requiring insulin  Recent Labs   Lab 01/06/25  0934 01/06/25  0514 01/06/25  0507 01/05/25  0540 01/05/25  0046 01/04/25  2019   * 160* 152* 150* 148* 138*     Plan:  Monitor.   Skin and Musculoskeletal Assessment:  No acute issues.    Plan:  Continue diligent nursing skin cares   Intensive Care .  Peripheral IV 12/25/24 Right Antecubital fossa (Active)   Site Assessment [facility] 01/06/25 0600   Line Status Infusing 01/06/25 0600   Dressing Transparent 01/06/25 0400   Dressing Status clean;dry;intact 01/06/25 0400   Line Intervention Flushed 01/03/25 0800   Phlebitis Scale 0-->no symptoms 01/06/25 0600   Infiltration? no 01/06/25 0600   Number of days: 12       Peripheral IV 12/30/24 Right Lower forearm (Active)   Site Assessment [facility] 01/06/25 0600   Line Status Infusing 01/06/25 0600   Dressing Transparent 01/06/25 0400   Dressing Status clean;dry;intact 01/06/25 0400   Line Intervention Flushed 01/04/25 0000   Phlebitis Scale 0-->no symptoms 01/06/25 0600   Infiltration? no 01/06/25 0600   Number of days: 7       ETT Cuffed 7.5 mm (Active)   Secured at (cm) 26 cm 01/06/25 0734   Measured from Teeth/Gums 01/06/25 0734   Position Left 01/06/25 0600   Secured by Commercial tube han 01/06/25 0734   Bite Block None Present 01/06/25 0734   Site Appearance Clean;Dry 01/06/25 0734   Tube Care Site care done 01/02/25 0341   Cuff Assessment Cuff Pressure 01/05/25 0815   Cuff Pressure - cm H2O 28 cmH20 01/05/25 0815   Safety Measures Manual resuscitator at bedside 01/06/25 0734   Number of days: 3       NG/OG/NJ Tube Orogastric (Active)   Site Description  WDL 25 0400   Status Enteral Feedings 25 0400   Drainage Appearance WDL 25 1600   Placement Confirmation [place] unchanged 25 0400   Securement Device Charting Secured to other stabilized tube (NJ, ETT, etc.) 25 0400   [place] (cm marking) at nare/mouth 60 cm 25 0400   Intake (ml) 100 ml 25 2000   Flush/Free Water (mL) 200 mL 25 0400   Output (ml) 0 ml 25 1600   Number of days: 3       Rectal Tube With balloon (Active)   Balloon fill volume  30 cc 25 2100   Stool Leakage None 25 0400   Rectal Tube Container Volume 450 ml 25 1000   Rectal Tube Output 25 ml 25 1000   Number of days: 4       External Urinary Catheter (Active)   Urine Color Yellow 25 0000   Urine Appearance Clear 25 0000   Output (mL) 200 mL 25 1000   Collection Container Standard 25 0000   Securement Method Tape 25 0000   External Catheter changed Done 25 0200   Number of days: 4      Central line: none  Arterial line: none  Restraint:none  Whitehead Catheter: no  GI prophylaxis: ppi  VAP prophylaxis: protocol  DVT prophylaxis: mechanical       [place]illin min spent for critical care exclusive of time for procedure             Prognosis:  guarded      Family update: children      Clinically Significant Risk Factors       Emelia Martinez MD  [phone]

## 2025-01-06 NOTE — PROGRESS NOTES
[facility]    Vascular Neurology Progress Note    Interval History     Temp: afebrile  SBP range: 126-170  Heart rate: WNL  Sodium: 154>153  Glucose: 152  WBC 10.1>11.4>10.1  Intubated   Sedated: precedex 0.2 mcg/kg/hr  Zosyn completed    Acute events: Psychiatry consulted and patient deemed not to have medical decision making capacity. Palliative/ethics discussion with family pending.    Exam today: no significant change (see below)    Hospital Course     Chief complaint: Dizziness (Sudden onset dizziness/sweating/family reported some slurred speech that was brief and gone by the time medics arrived. Was just sitting when episode happened.  No dizziness w change of position.  Off balance. +5 strength. LKW 1140)    Elio Aguilar is a 79 year old male with pertinent past medical history of a prior left corona radiata stroke in 2010 (no significant ICAD at that time), found to have PFO s/p closure at [place] per patient.  History of HTN, HLD, history of adult onset Reyes syndrome, DM2, does not see a doctor regularly.  Not on PTA ASA.     He presented to the emergency department 12/25/2024 with lightheadedness, diaphoresis, dysarthria x 5 minutes.  CTA showed severe basilar stenosis/occlusion beginning at V4/basilar junction.  No clear focal weakness or facial droop.  He was initially started on DAPT and then transition to heparin drip + ASA due to recurrent transient symptoms.  Initial MRI 12/26 negative for acute pathology.  Repeat CTA 12/27 showed some improvement in basilar narrowing.  Had new dysarthria so repeat MRI performed 12/28 and showed probable small pontine infarct.  MRA unchanged.     12/29/24 stroke code activated for progressive worsening of symptoms, dysarthria, hypophonia as well as new right arm/leg ataxia and right hemiparesis.  Underwent emergent diagnostic cerebral angiogram s/p angioplasty x 3 for severe multifocal basilar artery stenosis (required intubation due  to difficulty managing secretions).   12/30/24 newly febrile/leukocytosis, infectious workup started   1/1/25 CTA showed basilar artery stenosis with slightly better flow than before   1/2/25 had GOC discussion family unsure of trach/PEG versus trial extubation then CMO if fails  1/3/25 patient wanted to attempt extubation, was unable to protect airway, reintubated, palliative care consulted for further GOC discussion  1/4/25 had GOC discussion with family, patient indicated he does not want to be intubated or receive trach/PEG but wants to continue full medical care and believes he will have recovery.  Given concern for potential decision making capacity and family concern of the level of suffering he may endure, ethics and palliative teams were consulted.    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 He is s/p balloon Angioplasty x3 of the multifocal proximal to middle basilar artery, TICI 3 recanalization   #Severe bulbar weakness, dysphagia 2/2 above s/p intubation  # Hypernatremia  -Neuro checks and vitals every 2 hours  -continue Brilinta 90 mg twice daily and cilostazol 100 mg twice daily, duration per neuro IR  -No aspirin due to history of adult onset Reyes syndrome   -Appreciate ventilator management per intensivist team, wean sedation as tolerated   -telemetry  -ongoing goals of care discussion, appreciate palliative care, ethics, and psychiatry recommendations   -PT/OT/SPT, appreciate dietician recommendations on NGT/tube feeds   -Euthermia, euglycemia (BG <180), eunatremia  -Stroke Education, education provided on BEFAST stroke symptoms      #Ventilatory associated pneumonia (fever, leukocytosis and pulmonary infiltrates)  -appreciate intensivist evaluation and management, zosyn completed  -Respiratory culture normal tom, blood cultures NGTD      #Hypertension  -appreciate management per primary team  -Inpatient SBP goal  "120-160  -Outpatient BP goal <140/90, with tighter control if tolerated  -Recommend home blood pressure monitoring twice daily in AM and PM, keep log and bring to medical visits     #Hyperlipidemia, above goal  -Atorvastatin 80 mg daily  -Recommend LDL goal 40-70, <40 increases risk of intracranial hemorrhage  -Recommend Mediterranean diet      #Diabetes mellitus type 2  -appreciate management per primary team  -blood glucose monitoring, long term A1c goal <7%    DVT Prophylaxis: okay with pharmacologic VTE ppx from NCC perspective, defer to primary team    Patient Follow-up    - final recommendation pending work-up   -PM&R in [place]   -Follow-up in [place] at [facility] with Dr. Wan Rowan (referral made) in 6-8 weeks     We will continue to follow.     Discussed with vascular neurologist, Dr. Edy Betancoutr PA-C  Vascular Neurology    To page me or covering stroke neurology team member, click here: AMCOM  Choose \"On Call\" tab at top, then select \"NEUROLOGY/ALL SITES\" from middle drop-down box, press Enter, then look for \"stroke\" or \"telestroke\" for your site.    Physical Examination     Temp: 98.6  F (37  C) Temp src: Axillary BP: (!) 163/92 Pulse: 65   Resp: 23 SpO2: 98 % O2 Device: Mechanical Ventilator      Neurocritical Care Physical Examination:  General: Laying in bed, mechanical ventilation, eyes open as this provider enters the room  Resp: mechanical ventilation, thick secretions, suctioned x1     Neuro:  Mental status: alert, minimal sedation, able to nod yes/no to some questions, tries to mouth a few words but not able to understand with ETT, following commands  Cranial nerves: Left gaze preference, able to cross midline and track, he shakes his head that he doesn't want pupils assessed with pen light so did not check, cough intact to suction, face grossly symmetric with ETT  Motor: gives R thumbs up with command, able to lift distal RUE antigravity, able to " slightly wiggle a few fingers of his left hand on command, not able to wiggle his toes despite prompts but is able to slightly withdrawal to noxious BLE   Reflexes: Deferred  Sensory: see motor  Coordination:not able to obtain due to AMS/weakness   Gait: Unable to assess due to mental status.      Imaging/Labs   (Bolded imaging and labs new and/or personally reviewed or re-reviewed by me today)    MRI and/or Head CT MRI brain 12/30: Acute pontine and scattered right cerebellar/occipital lobe ischemic infarcts, patchy meningeal enhancement throughout right cerebellar hemisphere, nonspecific but may be related to recent catheter directed angiogram  MRI 12/28: Indeterminate diffusion hyperintensity in the ventral armin, without a definite T2 or ADC correlate. Findings may reflect evolving ischemic change or artifact from the adjacent skull base.   Initial MRI: No acute stroke   Intracranial Vasculature MRA 12/28: Unchanged     CTA 12/27: Formally read as stable from prior, however feel there is some improvement in basilar stenosis on stroke team review.     CTA: Multifocal high-grade stenosis/near occlusion of the proximal/mid basilar, bilateral PCA stenosis, moderate left M1 stenosis, moderate supraclinoid right ICA stenosis     MRA: Absent flow related contrast at the basilar artery, multifocal ICAD   Cervical Vasculature CTA: 40 to 50% right cervical ICA stenosis      Echocardiogram EF 60 to 65%, no regional wall motion abnormalities, normal left atrial size   EKG/Telemetry SR   Other Testing P2Y12: 7     Diagnostic cerebral angiogram:  successful Balloon Angioplasty x3 of the multifocal proximal to middle basilar artery stenosis TICI 3 recanalization. With residual stenosis measuring per WASID criteria. pending formal report    Respiratory culture: 4+ normal tom (final result)  Blood cultures:  NGTD       LDL  12/25/2024: 131 mg/dL   A1C  12/25/2024: 5.1 %   Troponin No lab value available in past 48 hrs     Other  labs reviewed by me today:  Mg, P, CBC, BMP    Time Spent on this Encounter   Billing: I personally examined and evaluated the patient today. At the time of my evaluation and management the patient was in critical condition today due to stroke code. I personally managed review of chart, medical record, meds, imaging, history, exam, and discussion with attending regarding plan and documentation. I spent a total of 35 minutes providing critical care services, evaluating the patient, directing care and reviewing laboratory values and radiologic reports.

## 2025-01-06 NOTE — PROGRESS NOTES
[facility] ICU RESPIRATORY NOTE        Date of Admission: 12/25/2024    Date of Intubation (most recent): 1/3/2025    Reason for Mechanical Ventilation: Airway protection    Number of Days on Mechanical Ventilation: 4    Met Criteria for Spontaneous Breathing Trial: No    Reason for No Spontaneous Breathing Trial: Per MD    Bite Block: No    Significant Events Today: None    ABG Results:   Recent Labs   Lab 01/03/25  1511 01/01/25  2116   O2PER 30 30         Current Vent Settings: FiO2 (%): 40 %, Resp: 18, Vent Mode: CMV/AC, Resp Rate (Set): 18 breaths/min, Tidal Volume (Set, mL): 530 mL, PEEP (cm H2O): 5 cmH2O, Pressure Support (cm H2O): 5 cmH2O, Resp Rate (Set): 18 breaths/min, Tidal Volume (Set, mL): 530 mL, PEEP (cm H2O): 5 cmH2O      Elvis Winter, RT on 1/6/2025 at 6:29 AM

## 2025-01-06 NOTE — CONSULTS
Initial Psychiatric Consult   Consult date: January 6, 2025         Reason for Consult, requesting source:      Requesting source: Dave Diop    Labs and imaging reviewed. Patient seen and evaluated by Walter Mclain PA-C          HPI:   Elio Aguilar is a 79 year old male with history of HTN, HLD, ANGEL, Reye's Syndrome who was admitted on 12/25/2024 with posterior CVA. Hospital course has been complicated by another stroke on 12/29/24 while admitted. Extubation was trial-ed on 1/3/25 unsuccessfully. Patient has been requesting full intervention and recovery, but declining some intrusive procedures like tracheostomy and gastrostomy.    Progress Notes by Emelia Martinez MD (01/06/2025 10:38 AM)   Progress Notes by Kandis Betancourt PA-C (01/06/2025 7:48 AM)     On approach patient is in hospital bed connected to ventilator. He is receiving low dose precedex, and is able to respond to verbal prompts with a thumbs up for yes.   He is able to acknowledge that he had a stroke. He wants to get better. Initially he refuses to allow tracheostomy. After some conversation patient is able to make clear his preference to remain alive, and his optimism that he will regain functionality. He is minimally able to acknowledge that recover is very unlikely. He does not prefer to have tubes, but he desires to be alive, so accepts that tubes would be necessary to prolong his life. He does not want to die. He does want to regain his functionality. Family remains very concerned about the quality of life available to him, and continue to advocate for comfort cares given his prognosis.         Past Psychiatric History:           Substance Use and History:           Past Medical History:   PAST MEDICAL HISTORY: History reviewed. No pertinent past medical history.    PAST SURGICAL HISTORY: No past surgical history on file.          Family History:   FAMILY HISTORY: No family history on file.    Family Psychiatric History:          Social History:   SOCIAL HISTORY:   Social History     Tobacco Use    Smoking status: Not on file    Smokeless tobacco: Not on file   Substance Use Topics    Alcohol use: Not on file                Physical ROS:   The 10 point Review of Systems is negative other than noted in the HPI or here.           Medications:     Current Facility-Administered Medications   Medication Dose Route Frequency Provider Last Rate Last Admin    acetylcysteine (MUCOMYST) 20 % nebulizer solution 2 mL  2 mL Nebulization Q6H Cheryl Faulkner MD   2 mL at 01/06/25 0734    albuterol (PROVENTIL) neb solution 2.5 mg  2.5 mg Nebulization Q6H Cheryl Faulkner MD   2.5 mg at 01/06/25 0734    atorvastatin (LIPITOR) tablet 80 mg  80 mg Oral or Feeding Tube QPM Dave Diop MD   80 mg at 01/05/25 1940    chlorhexidine (PERIDEX) 0.12 % solution 15 mL  15 mL Mouth/Throat Q12H Regino Howard MD   15 mL at 01/05/25 1940    cilostazol (PLETAL) tablet 100 mg  100 mg Oral or NG Tube BID Kandis Betancourt PA-C   100 mg at 01/05/25 1940    pantoprazole (PROTONIX) 2 mg/mL suspension 40 mg  40 mg Per Feeding Tube QA AC Regino Howard MD   40 mg at 01/05/25 0746    Or    pantoprazole (PROTONIX) IV push injection 40 mg  40 mg Intravenous QAM  Regino Howard MD   40 mg at 01/03/25 0818    scopolamine (TRANSDERM) 72 hr patch 1 patch  1 patch Transdermal Q72H Cheryl Faulkner MD   1 patch at 01/04/25 1351    And    scopolamine (TRANSDERM-SCOP) Patch in Place   Transdermal Q8H Cheryl Faulkner MD        sodium chloride (PF) 0.9% PF flush 3 mL  3 mL Intracatheter Q8H Dave Diop MD   3 mL at 01/05/25 1807    ticagrelor (BRILINTA) tablet 90 mg  90 mg Oral or NG Tube BID Lars Pappas MD   90 mg at 01/05/25 1940              Allergies:   No Known Allergies       Labs:     Recent Results (from the past 48 hours)   CBC with platelets    Collection Time: 01/04/25  3:11 PM   Result Value Ref Range    WBC Count 10.1 4.0 - 11.0 10e3/uL     RBC Count 3.74 (L) 4.40 - 5.90 10e6/uL    Hemoglobin 12.1 (L) 13.3 - 17.7 g/dL    Hematocrit 35.4 (L) 40.0 - 53.0 %    MCV 95 78 - 100 fL    MCH 32.4 26.5 - 33.0 pg    MCHC 34.2 31.5 - 36.5 g/dL    RDW 13.0 10.0 - 15.0 %    Platelet Count 196 150 - 450 10e3/uL   Basic metabolic panel    Collection Time: 01/04/25  3:11 PM   Result Value Ref Range    Sodium 151 (H) 135 - 145 mmol/L    Potassium 3.8 3.4 - 5.3 mmol/L    Chloride 118 (H) 98 - 107 mmol/L    Carbon Dioxide (CO2) 23 22 - 29 mmol/L    Anion Gap 10 7 - 15 mmol/L    Urea Nitrogen 51.6 (H) 8.0 - 23.0 mg/dL    Creatinine 1.52 (H) 0.67 - 1.17 mg/dL    GFR Estimate 46 (L) >60 mL/min/1.73m2    Calcium 8.8 8.8 - 10.4 mg/dL    Glucose 161 (H) 70 - 99 mg/dL   Glucose by meter    Collection Time: 01/04/25  8:19 PM   Result Value Ref Range    GLUCOSE BY METER POCT 138 (H) 70 - 99 mg/dL   Glucose by meter    Collection Time: 01/05/25 12:46 AM   Result Value Ref Range    GLUCOSE BY METER POCT 148 (H) 70 - 99 mg/dL   Magnesium    Collection Time: 01/05/25  5:40 AM   Result Value Ref Range    Magnesium 2.7 (H) 1.7 - 2.3 mg/dL   Phosphorus    Collection Time: 01/05/25  5:40 AM   Result Value Ref Range    Phosphorus 3.0 2.5 - 4.5 mg/dL   CBC with platelets    Collection Time: 01/05/25  5:40 AM   Result Value Ref Range    WBC Count 11.4 (H) 4.0 - 11.0 10e3/uL    RBC Count 3.93 (L) 4.40 - 5.90 10e6/uL    Hemoglobin 12.7 (L) 13.3 - 17.7 g/dL    Hematocrit 38.2 (L) 40.0 - 53.0 %    MCV 97 78 - 100 fL    MCH 32.3 26.5 - 33.0 pg    MCHC 33.2 31.5 - 36.5 g/dL    RDW 13.1 10.0 - 15.0 %    Platelet Count 210 150 - 450 10e3/uL   Basic metabolic panel    Collection Time: 01/05/25  5:40 AM   Result Value Ref Range    Sodium 154 (H) 135 - 145 mmol/L    Potassium 4.0 3.4 - 5.3 mmol/L    Chloride 121 (H) 98 - 107 mmol/L    Carbon Dioxide (CO2) 23 22 - 29 mmol/L    Anion Gap 10 7 - 15 mmol/L    Urea Nitrogen 52.9 (H) 8.0 - 23.0 mg/dL    Creatinine 1.47 (H) 0.67 - 1.17 mg/dL    GFR  Estimate 48 (L) >60 mL/min/1.73m2    Calcium 9.1 8.8 - 10.4 mg/dL    Glucose 150 (H) 70 - 99 mg/dL   Glucose by meter    Collection Time: 01/06/25  5:07 AM   Result Value Ref Range    GLUCOSE BY METER POCT 152 (H) 70 - 99 mg/dL   Magnesium    Collection Time: 01/06/25  5:14 AM   Result Value Ref Range    Magnesium 2.6 (H) 1.7 - 2.3 mg/dL   Phosphorus    Collection Time: 01/06/25  5:14 AM   Result Value Ref Range    Phosphorus 3.0 2.5 - 4.5 mg/dL   CBC with platelets    Collection Time: 01/06/25  5:14 AM   Result Value Ref Range    WBC Count 10.1 4.0 - 11.0 10e3/uL    RBC Count 3.97 (L) 4.40 - 5.90 10e6/uL    Hemoglobin 12.7 (L) 13.3 - 17.7 g/dL    Hematocrit 38.6 (L) 40.0 - 53.0 %    MCV 97 78 - 100 fL    MCH 32.0 26.5 - 33.0 pg    MCHC 32.9 31.5 - 36.5 g/dL    RDW 12.7 10.0 - 15.0 %    Platelet Count 201 150 - 450 10e3/uL   Basic metabolic panel    Collection Time: 01/06/25  5:14 AM   Result Value Ref Range    Sodium 153 (H) 135 - 145 mmol/L    Potassium 4.7 3.4 - 5.3 mmol/L    Chloride 121 (H) 98 - 107 mmol/L    Carbon Dioxide (CO2) 19 (L) 22 - 29 mmol/L    Anion Gap 13 7 - 15 mmol/L    Urea Nitrogen 47.3 (H) 8.0 - 23.0 mg/dL    Creatinine 1.27 (H) 0.67 - 1.17 mg/dL    GFR Estimate 57 (L) >60 mL/min/1.73m2    Calcium 9.2 8.8 - 10.4 mg/dL    Glucose 160 (H) 70 - 99 mg/dL          Physical and Psychiatric Examination:     BP (!) 163/92   Pulse 65   Temp 98.6  F (37  C) (Axillary)   Resp 23   Ht 1.829 m (6')   Wt 88.5 kg (195 lb 1.7 oz)   SpO2 98%   BMI 26.46 kg/m    Weight is 195 lbs 1.71 oz  Body mass index is 26.46 kg/m .    Physical Exam:  I have reviewed the physical exam as documented by by the medical team and agree with findings and assessment and have no additional findings to add at this time.    Mental Status Exam:    Appearance: awake, alert  Attitude:  cooperative  Eye Contact:  poor   Mood:  anxious and depressed  Affect:  immobile  Speech:  mute  Language: Fluent in english   Psychomotor  "Behavior:  no evidence of tardive dyskinesia, dystonia, or tics  Thought Process:   Answers shavonne or no  Associations:    Thought Content:  no evidence of suicidal ideation or homicidal ideation  Insight:  limited  Judgement:  limited  Oriented to:  time, person, and place  Attention Span and Concentration:  poor  Recent and Remote Memory:  limited  Fund of Knowledge: Appropriate   Gait and Station:                DSM-5 Diagnosis:   Capacity assessment.           Assessment:   Assessment limited by yes or no questions. At one point attempted to have patient spell out a word. He was able to spell \"get\", and was unable to spell the second word he wanted to say. He either forgot what word he was attempting to convey, or how to spell it. Seems to grasp the basics of what is being said. Very difficult to ascertain his understanding of complicated concepts.        Writer is able to review capacity as a functional term that refers to the ability to make a particular decision or perform a specific task. It is not static and can vary depending on the situation and the person's condition. The four key components to address in a capacity evaluation include: 1) communicating a choice, 2) understanding, 3) appreciation, and 4) rationalization/reasoning.     Aid to Capacity  Able to understand medical problem?  Understands he has had a stroke, but limited understanding of the permanent consequences    2. Able to understand proposed treatment? Yes    3. Able to understand alternative to proposed treatment (if any)? Yes    4. Able to understand option of refusing proposed treatment? Yes    5. Able to appreciate reasonably foreseeable consequences of accepting proposed treatment?  No    6. Able to appreciate reasonably foreseeable consequences of refusing proposed treatment?  Does not understand likely results of declining procedure  7. The person s decision is affected by depression? No   8. The person s decision is affected by " delusion/psychosis? No   Summary of capacity screener: Does not have capacity    After reviewing evidence based screeners, such as a SLUMS, CPT, etc... for the patient and reviewing collateral information, it appears patient does not display complex medical decision making capacity at this time regarding Invasive procedures.  Writer recommends that care team review [facility]'s policy on identification of alternate decision maker for this particular decision or specific task.        Strongly recommend significant consideration of the utility of the procedures being suggested. Patient is aware that he may die, and he does not wish to die. Additionally, He is motivated to return to he previous functionality, which is not probable. Offering treatment options that will not be effective in attaining this goal places the patient in a difficult position. He is currently able to express that he will accept trach/peg, which is a reversal from his statements of several days refusing this. It appears that he is currently willing to accept any treatment that has a small chance of prolonging life. He does not seem to appreciate the ongoing risk of additional strokes, his inability to swallow food, the risk of pneumonia, or that his paralysis will persist.    The choice between accepting multiple invasive procedures to prolong life with high likelihood of  mortality, vs declining those procedures and accepting that death is immanent would be quite difficult for anyone. This already complicated choice is complicated further by inability to communicate fully, and the impact on brain function from the 2 recent strokes.           Summary of Recommendations:   Recommend offering treatment that is in line with patient's goals. This may require clarifying if prolonging life or quality/functionality of life is the primary goal. Patient currently appears likely to request any treatment that offers the slightest chance of recovery  regardless of the consequences of that decision. But also has not remained consistent in the choices he has expressed.  Patient does not currently have capacity to make significant life altering medical decisions without input from trusted alternate decision makers.       Walter Mclain PA-C

## 2025-01-07 VITALS
HEART RATE: 107 BPM | BODY MASS INDEX: 26.43 KG/M2 | RESPIRATION RATE: 18 BRPM | TEMPERATURE: 98.2 F | OXYGEN SATURATION: 83 % | HEIGHT: 72 IN | DIASTOLIC BLOOD PRESSURE: 66 MMHG | WEIGHT: 195.11 LBS | SYSTOLIC BLOOD PRESSURE: 113 MMHG

## 2025-01-07 PROCEDURE — 250N000011 HC RX IP 250 OP 636: Performed by: HOSPITALIST

## 2025-01-07 PROCEDURE — 250N000011 HC RX IP 250 OP 636: Performed by: INTERNAL MEDICINE

## 2025-01-07 PROCEDURE — 99238 HOSP IP/OBS DSCHRG MGMT 30/<: CPT

## 2025-01-07 PROCEDURE — 99207 PR NO BILLABLE SERVICE THIS VISIT: CPT | Performed by: HOSPITALIST

## 2025-01-07 PROCEDURE — 99207 PR NO BILLABLE SERVICE THIS VISIT: CPT | Performed by: INTERNAL MEDICINE

## 2025-01-07 RX ORDER — HYDROMORPHONE HYDROCHLORIDE 1 MG/ML
2 SOLUTION ORAL
Status: DISCONTINUED | OUTPATIENT
Start: 2025-01-07 | End: 2025-01-07 | Stop reason: HOSPADM

## 2025-01-07 RX ORDER — HYDROMORPHONE HYDROCHLORIDE 1 MG/ML
1 SOLUTION ORAL
Status: DISCONTINUED | OUTPATIENT
Start: 2025-01-07 | End: 2025-01-07 | Stop reason: HOSPADM

## 2025-01-07 RX ORDER — ACETAMINOPHEN 650 MG/1
650 SUPPOSITORY RECTAL EVERY 6 HOURS PRN
Status: DISCONTINUED | OUTPATIENT
Start: 2025-01-07 | End: 2025-01-07 | Stop reason: HOSPADM

## 2025-01-07 RX ORDER — BISACODYL 10 MG
10 SUPPOSITORY, RECTAL RECTAL
Status: DISCONTINUED | OUTPATIENT
Start: 2025-01-10 | End: 2025-01-07 | Stop reason: HOSPADM

## 2025-01-07 RX ORDER — OLANZAPINE 5 MG/1
5 TABLET, ORALLY DISINTEGRATING ORAL EVERY 6 HOURS PRN
Status: DISCONTINUED | OUTPATIENT
Start: 2025-01-07 | End: 2025-01-07 | Stop reason: HOSPADM

## 2025-01-07 RX ORDER — LORAZEPAM 2 MG/ML
1 INJECTION INTRAMUSCULAR
Status: DISCONTINUED | OUTPATIENT
Start: 2025-01-07 | End: 2025-01-07 | Stop reason: HOSPADM

## 2025-01-07 RX ORDER — ONDANSETRON 4 MG/1
4 TABLET, ORALLY DISINTEGRATING ORAL EVERY 6 HOURS PRN
Status: DISCONTINUED | OUTPATIENT
Start: 2025-01-07 | End: 2025-01-07 | Stop reason: HOSPADM

## 2025-01-07 RX ORDER — NALOXONE HYDROCHLORIDE 0.4 MG/ML
0.1 INJECTION, SOLUTION INTRAMUSCULAR; INTRAVENOUS; SUBCUTANEOUS
Status: DISCONTINUED | OUTPATIENT
Start: 2025-01-07 | End: 2025-01-07 | Stop reason: HOSPADM

## 2025-01-07 RX ORDER — LORAZEPAM 1 MG/1
1 TABLET ORAL
Status: DISCONTINUED | OUTPATIENT
Start: 2025-01-07 | End: 2025-01-07 | Stop reason: HOSPADM

## 2025-01-07 RX ORDER — HYDROMORPHONE HYDROCHLORIDE 1 MG/ML
0.5 INJECTION, SOLUTION INTRAMUSCULAR; INTRAVENOUS; SUBCUTANEOUS
Status: DISCONTINUED | OUTPATIENT
Start: 2025-01-07 | End: 2025-01-07 | Stop reason: HOSPADM

## 2025-01-07 RX ORDER — SENNOSIDES 8.6 MG
1 TABLET ORAL 2 TIMES DAILY PRN
Status: DISCONTINUED | OUTPATIENT
Start: 2025-01-07 | End: 2025-01-07 | Stop reason: HOSPADM

## 2025-01-07 RX ORDER — MINERAL OIL/HYDROPHIL PETROLAT
OINTMENT (GRAM) TOPICAL
Status: DISCONTINUED | OUTPATIENT
Start: 2025-01-07 | End: 2025-01-07 | Stop reason: HOSPADM

## 2025-01-07 RX ORDER — CARBOXYMETHYLCELLULOSE SODIUM 5 MG/ML
1-2 SOLUTION/ DROPS OPHTHALMIC
Status: DISCONTINUED | OUTPATIENT
Start: 2025-01-07 | End: 2025-01-07 | Stop reason: HOSPADM

## 2025-01-07 RX ORDER — HYDROMORPHONE HYDROCHLORIDE 2 MG/1
2 TABLET ORAL
Status: DISCONTINUED | OUTPATIENT
Start: 2025-01-07 | End: 2025-01-07 | Stop reason: HOSPADM

## 2025-01-07 RX ORDER — NALOXONE HYDROCHLORIDE 0.4 MG/ML
0.2 INJECTION, SOLUTION INTRAMUSCULAR; INTRAVENOUS; SUBCUTANEOUS
Status: DISCONTINUED | OUTPATIENT
Start: 2025-01-07 | End: 2025-01-07 | Stop reason: HOSPADM

## 2025-01-07 RX ORDER — ONDANSETRON 2 MG/ML
4 INJECTION INTRAMUSCULAR; INTRAVENOUS EVERY 6 HOURS PRN
Status: DISCONTINUED | OUTPATIENT
Start: 2025-01-07 | End: 2025-01-07 | Stop reason: HOSPADM

## 2025-01-07 RX ORDER — ACETAMINOPHEN 325 MG/10.15ML
650 LIQUID ORAL EVERY 6 HOURS PRN
Status: DISCONTINUED | OUTPATIENT
Start: 2025-01-07 | End: 2025-01-07 | Stop reason: HOSPADM

## 2025-01-07 RX ORDER — HYDROMORPHONE HYDROCHLORIDE 1 MG/ML
0.3 INJECTION, SOLUTION INTRAMUSCULAR; INTRAVENOUS; SUBCUTANEOUS
Status: DISCONTINUED | OUTPATIENT
Start: 2025-01-07 | End: 2025-01-07 | Stop reason: HOSPADM

## 2025-01-07 RX ADMIN — MIDAZOLAM 4 MG: 1 INJECTION INTRAMUSCULAR; INTRAVENOUS at 03:04

## 2025-01-07 RX ADMIN — MIDAZOLAM 4 MG: 1 INJECTION INTRAMUSCULAR; INTRAVENOUS at 05:25

## 2025-01-07 RX ADMIN — MIDAZOLAM 2 MG: 1 INJECTION INTRAMUSCULAR; INTRAVENOUS at 05:07

## 2025-01-07 RX ADMIN — HYDROMORPHONE HYDROCHLORIDE 2 MG: 1 INJECTION, SOLUTION INTRAMUSCULAR; INTRAVENOUS; SUBCUTANEOUS at 01:12

## 2025-01-07 RX ADMIN — MIDAZOLAM 3 MG: 1 INJECTION INTRAMUSCULAR; INTRAVENOUS at 01:10

## 2025-01-07 RX ADMIN — HYDROMORPHONE HYDROCHLORIDE 2 MG: 1 INJECTION, SOLUTION INTRAMUSCULAR; INTRAVENOUS; SUBCUTANEOUS at 03:05

## 2025-01-07 RX ADMIN — HYDROMORPHONE HYDROCHLORIDE 1 MG: 1 INJECTION, SOLUTION INTRAMUSCULAR; INTRAVENOUS; SUBCUTANEOUS at 09:36

## 2025-01-07 RX ADMIN — HYDROMORPHONE HYDROCHLORIDE 0.5 MG: 1 INJECTION, SOLUTION INTRAMUSCULAR; INTRAVENOUS; SUBCUTANEOUS at 08:39

## 2025-01-07 RX ADMIN — MIDAZOLAM 4 MG: 1 INJECTION INTRAMUSCULAR; INTRAVENOUS at 02:47

## 2025-01-07 RX ADMIN — MIDAZOLAM 4 MG: 1 INJECTION INTRAMUSCULAR; INTRAVENOUS at 04:41

## 2025-01-07 RX ADMIN — MIDAZOLAM 2 MG: 1 INJECTION INTRAMUSCULAR; INTRAVENOUS at 00:15

## 2025-01-07 RX ADMIN — HYDROMORPHONE HYDROCHLORIDE 2 MG: 1 INJECTION, SOLUTION INTRAMUSCULAR; INTRAVENOUS; SUBCUTANEOUS at 04:41

## 2025-01-07 RX ADMIN — MIDAZOLAM 4 MG: 1 INJECTION INTRAMUSCULAR; INTRAVENOUS at 01:28

## 2025-01-07 RX ADMIN — HYDROMORPHONE HYDROCHLORIDE 2 MG: 1 INJECTION, SOLUTION INTRAMUSCULAR; INTRAVENOUS; SUBCUTANEOUS at 00:16

## 2025-01-07 RX ADMIN — HYDROMORPHONE HYDROCHLORIDE 2 MG: 1 INJECTION, SOLUTION INTRAMUSCULAR; INTRAVENOUS; SUBCUTANEOUS at 01:29

## 2025-01-07 RX ADMIN — HYDROMORPHONE HYDROCHLORIDE 0.5 MG: 1 INJECTION, SOLUTION INTRAMUSCULAR; INTRAVENOUS; SUBCUTANEOUS at 07:37

## 2025-01-07 RX ADMIN — LORAZEPAM 1 MG: 2 INJECTION INTRAMUSCULAR; INTRAVENOUS at 07:50

## 2025-01-07 ASSESSMENT — ACTIVITIES OF DAILY LIVING (ADL)
ADLS_ACUITY_SCORE: 54

## 2025-01-07 NOTE — PROGRESS NOTES
Pt continues on comfort cares. Dilaudid and Versed push given PRN for pt comfort. Pt comfortable. Cheyne fountain breathing. Scopolamine patch in place.    Family remains at bedside.    Hospitalist stopped by this am. Possible tx out of ICU today.

## 2025-01-07 NOTE — PROGRESS NOTES
[facility]  _________________________________________________________________     [facility] 24[facility]7 Contact Number: [phone]    - Providers: Please contact [facility] with changes in orders or clinical plan of care   - Nursing: Please contact [facility] with significant changes in patient condition     Hospice will notify the care team (including the hospitalist) to confirm date of inpatient hospice (GIP) admission.    New Epic encounter will not be created until hospice completes admission.     Received, thank you for the referral. We will work on sending someone to meet with pt/family

## 2025-01-07 NOTE — PROGRESS NOTES
Patient time of death was 0950. Family notified and at bedside. Belongings (clothing and blanket) sent with family. Autopsy requested (no). Death record completed. Patient sent to [facility] at 1209.

## 2025-01-07 NOTE — PROGRESS NOTES
Noted patient has changed to comfort care status.  Will be available if more aggressive nutrition intervention desired.  Lakisha Ruiz RD, LD, CNSC   Clinical Dietitian - [facility]

## 2025-01-07 NOTE — CONSULTS
Hospitalist Consultation    Date of Admission:  12/25/2024  Date of Consult (When I saw the patient): 01/07/25    Assessment & Plan   Elio Aguilar is a markedly pleasant 79 year old gentleman who was admitted on 12/25/2024 for dizziness and speech changes, and found to have acute brainstem stroke. I was asked by Dr. Shay of the ICU team to evaluate this patient for co-management of transition to hospice care.    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 Aguilar was originally admitted to the Hospitalist service for evaluation of acute dizziness, ataxia, and slurred speech. CTA revealed severe basilar stenosis/occlusion beginning at the V4/basilar junction. He was started on high dose statin, as well as Heparin infusion and then transitioned to DAPT. Initial Brain MRI showed no acute changes, but on 12/28 he developed dysarthria and MRI was repeated and showed a small pontine infarct. On 12/29/2024 Inpatient Code Stroke was activated for progressive worsening of dysarthria and hypophonia, as well as new right sided ataxia and hemiparesis. He underwent emergent diagnostic cerebral angiogram and angioplasty x 3 of the multifocal proximal to middle basilar artery, for severe multifocal basilar artery stenosis. He required mechanical intubation due to difficulty managing secretions. Repeat MRI on 12/30 showed acute pontine and scattered right cerebellar/occipital lobe ischemic infarcts     Since then it seems he has had ongoing hypoxemic respiratory failure with inability safely to be extubated, due to loss of protective airway reflexes because of his brainstem and cerebellum infarction. The Stroke team note that the stroke has caused severe bulbar dysfunction as well as ongoing quadriparesis. The prognosis for recovery has been determined to be very low. Extensive care goal discussions have been held in the ICU, with Psychiatry and Ethics involvement. On  1/6/2025 his family determined that he would want compassionate extubation and transition to comfort care only. He was extubated approximately 12 hours ago.      -- His family say they would like hm to be transferred to the ICU and to continue comfort care. We discussed the merits of Dilaudid vs Morphine and I explained that the former may safer to use in patients with renal dysfunction. I have placed [facility] consult. They understand he could die today or perhaps the next few days.    He has also been treated during this admission for ventilatory associated pneumonia and completed a course of Zosyn. He has developed ongoing hypernatremia, and acute renal failure super-imposed on CKD Stage 3.     Chronic medical conditions include: prior PFO closure in 2010, history of nicotine use disorder, hypertension, hyperlipidemia, and adult onset reye's syndrome in 1981.    I have spent 55 minutes on the date of service in interview and examination of this patient, as well as lab and imaging review and ordering, medication administration, coordination of care, and other activities as per this note.    DVT Prophylaxis: VTE Prophylaxis contraindicated due to comfort care  Code Status: No CPR- Do NOT Intubate    Expected to die while hospitalized    Mynor Gomes MD, MD    Chief Complaint   Dizziness    History is obtained from the ICU Intensivist whom I have spoken with today as well as his family at the bedside.    History of Present Illness   Elio Aguilar is a markedly pleasant 79 year old gentleman who presented initially with dizziness. While hospitalized here he developed acute hypoxemic respiratory failure and quadriparesis, worse on the left side, with ongoing bulbar dysfunction. I am seeing him with multiple family at the bedside this morning after he was compassionately extubated yesterday. He is obtunded with irregular breathing, and has been receiving Dilaudid IV in the ICU for ongoing intermittent air  hunger. His family otherwise note no secretions or other acute symptoms of discomfort at this time.    Past Medical History    I have reviewed this patient's medical history and updated it with pertinent information if needed.     Past Surgical History   I have reviewed this patient's surgical history and updated it with pertinent information if needed.    Prior to Admission Medications   None     Allergies   No Known Allergies    Social History   I have reviewed this patient's social history and updated it with pertinent information if needed.     Family History   Family history reviewed with patient's family and is noncontributory.    Review of Systems   The 10 point Review of Systems is negative other than noted in the HPI or here.     Vital Signs with Ranges  Temp:  [98.2  F (36.8  C)-99.2  F (37.3  C)] 98.2  F (36.8  C)  Pulse:  [65-98] 97  Resp:  [13-23] 15  BP: (113-173)/(64-98) 113/66  FiO2 (%):  [30 %-40 %] 30 %  SpO2:  [81 %-98 %] 81 %  195 lbs 1.71 oz    Constitutional: Obtunded; no apparent distress  Respiratory: lungs clear to auscultation bilaterally  Cardiovascular: regular S1 S2   GI: abdomen soft non tender non distended bowel sounds positive  Musculoskeletal: no clubbing, cyanosis or edema    Data   -Data reviewed today: All pertinent laboratory and imaging results from this encounter were reviewed. I personally reviewed no images or EKG's today.  Recent Labs   Lab 01/06/25  1222 01/06/25  0934 01/06/25  0514 01/06/25  0507 01/05/25  0540 01/04/25  2019 01/04/25  1511 01/02/25  0454 01/02/25  0158   WBC  --   --  10.1  --  11.4*  --  10.1   < > 9.1   HGB  --   --  12.7*  --  12.7*  --  12.1*   < > 12.9*   MCV  --   --  97  --  97  --  95   < > 90   PLT  --   --  201  --  210  --  196   < > 167   NA  --   --  153*  --  154*  --  151*   < > 143   POTASSIUM  --   --  4.7  --  4.0  --  3.8   < > 3.5   CHLORIDE  --   --  121*  --  121*  --  118*   < > 110*   CO2  --   --  19*  --  23  --  23   < > 21*    BUN  --   --  47.3*  --  52.9*  --  51.6*   < > 31.0*   CR  --   --  1.27*  --  1.47*  --  1.52*   < > 1.56*   ANIONGAP  --   --  13  --  10  --  10   < > 12   SPENSER  --   --  9.2  --  9.1  --  8.8   < > 8.5*   * 148* 160*   < > 150*   < > 161*   < > 154*   ALBUMIN  --   --   --   --   --   --   --   --  3.1*   PROTTOTAL  --   --   --   --   --   --   --   --  6.1*   BILITOTAL  --   --   --   --   --   --   --   --  0.7   ALKPHOS  --   --   --   --   --   --   --   --  65   ALT  --   --   --   --   --   --   --   --  35   AST  --   --   --   --   --   --   --   --  23    < > = values in this interval not displayed.

## 2025-01-07 NOTE — CONSULTS
The purpose of this note, including any accompanying recommendation, is advisory only concerning ethical principles and considerations related to this case. Determination of appropriate patient care decisions is the responsibility of the clinical team in consultation with patients and their decision makers and relevant institutional policy. Legal and policy questions should be addressed with Risk Management.     This ethics note is to document and conclude ethics involvement.     There was initially concern about internal inconsistency with the patient's choices and values, as well as inconsistency between the patient and family's choices (based on the family's understanding of the patient's previously known values and preferences.)    I had an extensive conversation with Mr. Curtis and his family, (wife, daughter, son, grandson,) about their understanding of Mr. Curtis's condition, prognosis, and options regarding treatment goals. We also discussed in some detail the idea of decision making capacity, and the role of surrogate decision makers. We briefly reviewed the psychiatry consult that made a finding that Mr. Curtis does not have decision making capacity.     Although Mr. Curtis retains some limited degree of the ability to understand his condition, hold values, articulate preferences, and reason through options, there is significant impairment and limitation. Mr. Curtis's family seems to appreciate this. Mr. Curtis's family clearly demonstrated a deep commitment to affirming Mr. Curtis's interests and values both substantively and procedurally, and had a clear understanding of their role as surrogate decision makers and in exercising substituted judgment.     They impressed on me their commitment to ensuring Mr. Curtis understood and came to terms with the condition and prognosis, and wouldn't proceed with decision making in ways that would be inconsistent with his values or interests.     From the ethics standpoint, there is  "no clear conflict or disagreement to resolve. Mr. Curtis's family as a group are appropriate, effective surrogates. Based on the available information their decision making is consistent with Mr. Curtis's interests and values.     The decision to proceed with a transition to comfort cares is ethically appropriate both procedurally and substantively.     Andrea Higgins JD, MPH, MA, YANCY-C                                   Clinical Ethics Lead, [facility]: \"Clinical Ethics Consultation Service\" or [facility] \"Ethics\" consult pager [phone].   "

## 2025-01-07 NOTE — DISCHARGE SUMMARY
[facility]    Death Summary  Hospitalist    Date of Admission:  12/25/2024  Date of Death:         1/7/2025  Provider Completing Death Summary: Tanvir Singh MD    Discharge Diagnoses      Acute ischemic CVA  Severe basilar artery stenosis s/p angioplasty x 3  Acute hypoxic respiratory failure secondary to loss of protective airway reflexes  Hypernatremia  Acute kidney injury  Possible healthcare associated pneumonia  Hypertension  Hyperlipidemia  Diabetes mellitus type 2      Hospital Course   Elio Aguilar is a markedly pleasant 79 year old gentleman who was admitted on 12/25/2024 for dizziness and speech changes, and found to have acute brainstem stroke.  Hospital course complicated by progressive worsening of symptoms.  It was expected that significant deficit and likely will need trach and PEG for the rest of his life and unlikely to walk again.  Ongoing goals of care discussion was undertaken with the family, patient did not want to be re-intubated or receive trach/PEG.  Eventually he was compassionately extubated and transition to comfort cares.  Hospitalist service was consulted for transition to hospice care.     Acute ischemic CVA  Severe basilar artery stenosis s/p angioplasty x 3  Acute hypoxic respiratory failure secondary to loss of protective airway reflexes  Hypernatremia  Acute kidney injury  Possible healthcare associated pneumonia  Hypertension  Hyperlipidemia  Diabetes mellitus type 2  -He presented to the emergency department 12/25/2024 with lightheadedness, diaphoresis, dysarthria.  CTA showed severe basilar stenosis/occlusion beginning at V4/basilar junction. He was initially started on DAPT and then transitioned to heparin drip + ASA due to recurrent transient symptoms.  Initial MRI 12/26 negative for acute pathology.  Had new dysarthria so repeat MRI performed 12/28 and showed probable small pontine infarct.  12/29/24 stroke code activated for progressive  worsening of symptoms, dysarthria, hypophonia as well as new right arm/leg ataxia and right hemiparesis. Underwent emergent diagnostic cerebral angiogram s/p angioplasty x 3 for severe multifocal basilar artery stenosis (required intubation due to difficulty managing secretions).  As mentioned above due to worsening symptoms goals of care discussion was undertaken with the family and decision was made to transition to comfort care.  He was compassionately extubated on 2024.  -He was continued on comfort cares and transferred out of the ICU on 2025.  Shortly after that he  at 9:50 AM on 2025.    Tanvir Singh MD        Pending Results   Unresulted Labs Ordered in the Past 30 Days of this Admission       No orders found from 2024 to 2024.            Primary Care Physician   Physician No Ref-Primary    Consultations This Hospital Stay   PHARMACY IP CONSULT  SPEECH LANGUAGE PATH ADULT IP CONSULT  PHARMACY IP CONSULT  PHARMACY IP CONSULT  PHARMACY IP CONSULT  PHYSICAL THERAPY ADULT IP CONSULT  OCCUPATIONAL THERAPY ADULT IP CONSULT  REHAB ADMISSIONS LIAISON IP CONSULT  CARE MANAGEMENT / SOCIAL WORK IP CONSULT  NEUROLOGY IP STROKE CONSULT  PHARMACY LIAISON FOR MEDICATION COVERAGE CONSULT  SPEECH LANGUAGE PATH ADULT IP CONSULT  NEUROLOGY INTERVENTIONAL ADULT IP CONSULT  PHARMACY IP CONSULT  VASCULAR ACCESS ADULT IP CONSULT  INTENSIVIST IP CONSULT  NUTRITION SERVICES ADULT IP CONSULT  PHARMACY IP CONSULT  CARE MANAGEMENT / SOCIAL WORK IP CONSULT  PALLIATIVE CARE ADULT IP CONSULT  ETHICS IP CONSULT  PSYCHIATRY IP CONSULT  HOSPITALIST IP CONSULT  [facility] ADULT CONSULT  [facility] ADULT CONSULT  SPIRITUAL HEALTH SERVICES IP CONSULT    Time Spent on this Encounter   Tanvir BEDOLLA MD, personally saw the patient today and spent greater than 30 minutes discharging this patient.    Data   Most Recent 3 CBC's:  Recent Labs   Lab Test 25  0514 25  0540  01/04/25  1511   WBC 10.1 11.4* 10.1   HGB 12.7* 12.7* 12.1*   MCV 97 97 95    210 196      Most Recent 3 BMP's:  Recent Labs   Lab Test 01/06/25  1222 01/06/25  0934 01/06/25  0514 01/06/25  0507 01/05/25  0540 01/04/25  2019 01/04/25  1511   NA  --   --  153*  --  154*  --  151*   POTASSIUM  --   --  4.7  --  4.0  --  3.8   CHLORIDE  --   --  121*  --  121*  --  118*   CO2  --   --  19*  --  23  --  23   BUN  --   --  47.3*  --  52.9*  --  51.6*   CR  --   --  1.27*  --  1.47*  --  1.52*   ANIONGAP  --   --  13  --  10  --  10   SPENSER  --   --  9.2  --  9.1  --  8.8   * 148* 160*   < > 150*   < > 161*    < > = values in this interval not displayed.     Most Recent 2 LFT's:  Recent Labs   Lab Test 01/02/25  0158 12/30/24  0438   AST 23 75*   ALT 35 104*   ALKPHOS 65 68   BILITOTAL 0.7 0.6     Most Recent INR's and Anticoagulation Dosing History:  Anticoagulation Dose History          Latest Ref Rng & Units 12/18/2010 12/25/2024 12/30/2024   Recent Dosing and Labs   INR 0.85 - 1.15 1.05  1.10  1.26      Most Recent 3 Troponin's:No lab results found.  Most Recent Cholesterol Panel:  Recent Labs   Lab Test 12/25/24  1240   CHOL 222*   *   HDL 38*   TRIG 265*     Most Recent 6 Bacteria Isolates From Any Culture (See EPIC Reports for Culture Details):No lab results found.  Most Recent TSH, T4 and A1c Labs:  Recent Labs   Lab Test 12/25/24  1240   TSH 4.94*   T4 0.93   A1C 5.1

## 2025-01-07 NOTE — DEATH PRONOUNCEMENT
MD DEATH PRONOUNCEMENT    Called to pronounce Elio Aguilar dead.    Physical Exam: Spontaneous respirations absent, Breath sounds absent, Carotid pulse absent, and Heart sounds absent    Patient was pronounced dead at 9:50 AM, 2025.    Preliminary Cause of Death: Brainstem and cerebellum infarction    Active Problems:    Basilar artery stenosis    Slurred speech       Infectious disease present?: NO    Communicable disease present? (examples: HIV, chicken pox, TB, Ebola, CJD) :  NO    Multi-drug resistant organism present? (example: MRSA): NO    Please consider an autopsy if any of the following exist:  NO Unexpected or unexplained death during or following any dental, medical, or surgical diagnostic treatment procedures.   NO Death of mother at or up to seven days after delivery.     NO All  and pediatric deaths.     NO Death where the cause is sufficiently obscure to delay completion of the death certificate.   NO Deaths in which autopsy would confirm a suspected illness/condition that would affect surviving family members or recipients of transplanted organs.     The following deaths must be reported to the 's Office:  NO A death that may be due entirely or in part to any factors other than natural disease (recent surgery, recent trauma, suspected abuse/neglect).   NO A death that may be an accident, suicide, or homicide.     NO Any sudden, unexpected death in which there is no prior history of significant heart disease or any other condition associated with sudden death.   NO A death under suspicious, unusual, or unexpected circumstances.    NO Any death which is apparently due to natural causes but in which the  does not have a personal physician familiar with the patient s medical history, social, or environmental situation or the circumstances of the terminal event.   NO Any death apparently due to Sudden Infant Death Syndrome.     NO Deaths that occur during, in  association with, or as consequences of a diagnostic, therapeutic, or anesthetic procedure.   NO Any death in which a fracture of a major bone has occurred within the past (6) six months.   NO A death of persons note seen by their physician within 120 days of demise.     NO Any death in which the  was an inmate of a public institution or was in the custody of Law Enforcement personnel.   NO  All unexpected deaths of children   NO Solid organ donors   NO Unidentified bodies   YES Deaths of persons whose bodies are to be cremated or otherwise disposed of so that the bodies will later be unavailable for examination;   NO Deaths unattended by a physician outside of a licensed healthcare facility or licensed residential hospice program   NO Deaths occurring within 24 hours of arrival to a health care facility if death is unexpected.    NO Deaths associated with the decedent s employment.   NO Deaths attributed to acts of terrorism.   NO Any death in which there is uncertainty as to whether it is a medical examiner s care should be discussed with the medical investigator.        Body disposition: Autopsy was discussed with family member:  Spouse in person.  Permission for autopsy was declined.  Body released to the morgue.    Geovanny Carl NP  [facility]  Securely message with the Vocera Web Console (learn more here)  Text page via [employer] Paging/Directory

## 2025-01-07 NOTE — PROGRESS NOTES
[facility]    Medicine Progress Note - Hospitalist Service        Date of Admission:  12/25/2024 12:18 PM    Assessment & Plan:   Elio Aguilar is a markedly pleasant 79 year old gentleman who was admitted on 12/25/2024 for dizziness and speech changes, and found to have acute brainstem stroke.  Hospital course complicated by progressive worsening of symptoms.  It was expected that significant deficit and likely will need trach and PEG for the rest of his life and unlikely to walk again.  Ongoing goals of care discussion was undertaken with the family, patient did not want to be re-intubated or receive trach/PEG.  Eventually he was compassionately extubated and transition to comfort cares.  Hospitalist service was consulted for transition to hospice care.      Acute ischemic CVA  Severe basilar artery stenosis s/p angioplasty x 3  Acute hypoxic respiratory failure secondary to loss of protective airway reflexes  Hypernatremia  Acute kidney injury  Possible healthcare associated pneumonia  Hypertension  Hyperlipidemia  Diabetes mellitus type 2  -He presented to the emergency department 12/25/2024 with lightheadedness, diaphoresis, dysarthria.  CTA showed severe basilar stenosis/occlusion beginning at V4/basilar junction. He was initially started on DAPT and then transitioned to heparin drip + ASA due to recurrent transient symptoms.  Initial MRI 12/26 negative for acute pathology.  Had new dysarthria so repeat MRI performed 12/28 and showed probable small pontine infarct.  12/29/24 stroke code activated for progressive worsening of symptoms, dysarthria, hypophonia as well as new right arm/leg ataxia and right hemiparesis. Underwent emergent diagnostic cerebral angiogram s/p angioplasty x 3 for severe multifocal basilar artery stenosis (required intubation due to difficulty managing secretions).  As mentioned above due to worsening symptoms goals of care discussion was undertaken with the family and  decision was made to transition to comfort care.  He was compassionately extubated on 2024.  -Continues on comfort cares  -Transfer out of ICU today  -GIP hospice consult      Diet: Diet  Regular Diet Adult     DVT Prophylaxis: Comfort care patient  Whitehead Catheter: Not present  Code Status: No CPR- Do NOT Intubate     Disposition Plan      Expected Discharge Date: 01/10/2025      Destination: home with family  Discharge Comments: pending resolution of symptoms      Entered: Tanvir Singh MD 2025, 8:17 AM        Medically Ready for Discharge: Anticipated in 2-4 Days, patient is expected to  in the hospital       Clinically Significant Risk Factors         # Hypernatremia: Highest Na = 153 mmol/L in last 2 days, will monitor as appropriate  # Hyperchloremia: Highest Cl = 121 mmol/L in last 2 days, will monitor as appropriate          # Hypoalbuminemia: Lowest albumin = 3.1 g/dL at 2025  1:58 AM, will monitor as appropriate         # Acute Hypoxic Respiratory Failure: Documented O2 saturation < 90%. Continue supplemental oxygen as needed         # Overweight: Estimated body mass index is 26.46 kg/m  as calculated from the following:    Height as of this encounter: 1.829 m (6').    Weight as of this encounter: 88.5 kg (195 lb 1.7 oz).      # Financial/Environmental Concerns: none          The patient's care was discussed with the Bedside Nurse and Patient's Family.    Medical Decision Making       **CLEAR ALL SELECTIONS**      Labs/Imaging Reviewed:  See Information above and Data section below  Time SPENT BY ME on the date of service doing chart review, history, exam, documentation & further activities per the note:   MINUTES    Chart documentation was completed, in part, with [employer]di[employer] voice-recognition software. Even though reviewed, some grammatical, spelling, and word errors may remain.    Tanvir Singh MD  Hospitalist Service  [facility]  Text Page  7AM-6PM  Securely message with the Vocera Web Console (learn more here)  Text page via [employer] Paging/Directory    ______________________________________________________________________    Interval History   Care assumed.  Chart reviewed.  Discussed with primary RN and family.  Patient is currently on comfort care and will transfer out of the ICU.  I discussed with family regarding GIP hospice evaluation would be appropriate.    Data reviewed today: I reviewed all medications, new labs and imaging results over the last 24 hours. I personally reviewed no images or EKG's today.    Physical Exam   Vital signs:  Temp: 98.2  F (36.8  C) Temp src: Axillary BP: 113/66 Pulse: 107   Resp: 18 SpO2: (!) 81 % O2 Device: None (Room air) Oxygen Delivery: 15 LPM Height: 182.9 cm (6') Weight: 88.5 kg (195 lb 1.7 oz)  Estimated body mass index is 26.46 kg/m  as calculated from the following:    Height as of this encounter: 1.829 m (6').    Weight as of this encounter: 88.5 kg (195 lb 1.7 oz).      Wt Readings from Last 2 Encounters:   01/06/25 88.5 kg (195 lb 1.7 oz)       Gen: Obtunded, comfortable for the most part        Data   Recent Labs   Lab 01/06/25  1222 01/06/25  0934 01/06/25  0514 01/06/25  0507 01/05/25  0540 01/04/25  2019 01/04/25  1511 01/02/25  0454 01/02/25  0158   WBC  --   --  10.1  --  11.4*  --  10.1   < > 9.1   HGB  --   --  12.7*  --  12.7*  --  12.1*   < > 12.9*   MCV  --   --  97  --  97  --  95   < > 90   PLT  --   --  201  --  210  --  196   < > 167   NA  --   --  153*  --  154*  --  151*   < > 143   POTASSIUM  --   --  4.7  --  4.0  --  3.8   < > 3.5   CHLORIDE  --   --  121*  --  121*  --  118*   < > 110*   CO2  --   --  19*  --  23  --  23   < > 21*   BUN  --   --  47.3*  --  52.9*  --  51.6*   < > 31.0*   CR  --   --  1.27*  --  1.47*  --  1.52*   < > 1.56*   ANIONGAP  --   --  13  --  10  --  10   < > 12   SPENSER  --   --  9.2  --  9.1  --  8.8   < > 8.5*   * 148* 160*   < > 150*   < > 161*   < >  154*   ALBUMIN  --   --   --   --   --   --   --   --  3.1*   PROTTOTAL  --   --   --   --   --   --   --   --  6.1*   BILITOTAL  --   --   --   --   --   --   --   --  0.7   ALKPHOS  --   --   --   --   --   --   --   --  65   ALT  --   --   --   --   --   --   --   --  35   AST  --   --   --   --   --   --   --   --  23    < > = values in this interval not displayed.       No results found for this or any previous visit (from the past 24 hours).  Medications   Current Facility-Administered Medications   Medication Dose Route Frequency Provider Last Rate Last Admin     Current Facility-Administered Medications   Medication Dose Route Frequency Provider Last Rate Last Admin    HYDROmorphone (DILAUDID) injection 1 mg  1 mg Intravenous Q4H Tanvir Singh MD        scopolamine (TRANSDERM) 72 hr patch 1 patch  1 patch Transdermal Q72H Mynor Gomes MD   1 patch at 01/04/25 1351    And    scopolamine (TRANSDERM-SCOP) Patch in Place   Transdermal Q8H Mynor Gomes MD        sodium chloride (PF) 0.9% PF flush 3 mL  3 mL Intracatheter Q8H Mynor Gomes MD   3 mL at 01/06/25 0934

## 2025-01-07 NOTE — PLAN OF CARE
Problem: Adult Inpatient Plan of Care  Goal: Absence of Hospital-Acquired Illness or Injury  Intervention: Prevent Skin Injury  Recent Flowsheet Documentation  Taken 1/7/2025 0800 by Caty Benson RN  Body Position: (family refused repositioning) heels elevated  Goal: Optimal Comfort and Wellbeing  Intervention: Provide Person-Centered Care  Recent Flowsheet Documentation  Taken 1/7/2025 0800 by Caty Benson RN  Trust Relationship/Rapport:   care explained   choices provided   emotional support provided   empathic listening provided   thoughts/feelings acknowledged   Goal Outcome Evaluation:    PRN dilaudid and ativan given for comfort. External catheter and rectal tube in place. Family present at bedside. RN handoff given. Patient transferred to station 88.

## 2025-01-07 NOTE — CONSULTS
SPIRITUAL HEALTH SERVICES - Consult Note  [facility] MedSurg [facility]    Referral Source/Reason for Visit: staff referral/family request for  support following pt death    Provided support to Yanira's family (spouse, son, daughter) gathered at bedside following his death.    Family finding support in one another at this time. Pat's wife, Martine, requested prayer - and we shared prayer together.    Oriented family to continued  availability throughout the day.    Plan: Please contact as needs arise. Spiritual Health remains available.    Emelia Du  Associate     [facility] available 24/7 for emergent requests/referrals, either by paging the on-call  or by entering an ASAP/STAT consult in Epic (this will also page the on-call ).

## 2025-01-07 NOTE — PROGRESS NOTES
[id number]    Pt extubated this afternoon as patient and family agreed to comfort care measures. At time of note, pt is resting comfortably in bed surrounded by family.

## (undated) RX ORDER — NITROGLYCERIN 5 MG/ML
VIAL (ML) INTRAVENOUS
Status: DISPENSED
Start: 2024-12-29

## (undated) RX ORDER — GLYCOPYRROLATE 0.2 MG/ML
INJECTION, SOLUTION INTRAMUSCULAR; INTRAVENOUS
Status: DISPENSED
Start: 2024-12-29

## (undated) RX ORDER — HEPARIN SODIUM 200 [USP'U]/100ML
INJECTION, SOLUTION INTRAVENOUS
Status: DISPENSED
Start: 2024-12-29

## (undated) RX ORDER — LIDOCAINE HYDROCHLORIDE 10 MG/ML
INJECTION, SOLUTION INFILTRATION; PERINEURAL
Status: DISPENSED
Start: 2024-12-29

## (undated) RX ORDER — VERAPAMIL HYDROCHLORIDE 2.5 MG/ML
INJECTION, SOLUTION INTRAVENOUS
Status: DISPENSED
Start: 2024-12-29

## (undated) RX ORDER — HEPARIN SODIUM 1000 [USP'U]/ML
INJECTION, SOLUTION INTRAVENOUS; SUBCUTANEOUS
Status: DISPENSED
Start: 2024-12-29

## (undated) RX ORDER — PROPOFOL 10 MG/ML
INJECTION, EMULSION INTRAVENOUS
Status: DISPENSED
Start: 2024-12-29

## (undated) RX ORDER — NOREPINEPHRINE BITARTRATE 0.02 MG/ML
INJECTION, SOLUTION INTRAVENOUS
Status: DISPENSED
Start: 2024-12-29